# Patient Record
Sex: FEMALE | Race: WHITE | NOT HISPANIC OR LATINO | Employment: OTHER | URBAN - METROPOLITAN AREA
[De-identification: names, ages, dates, MRNs, and addresses within clinical notes are randomized per-mention and may not be internally consistent; named-entity substitution may affect disease eponyms.]

---

## 2017-01-09 ENCOUNTER — OFFICE VISIT (OUTPATIENT)
Dept: PODIATRY | Facility: CLINIC | Age: 82
End: 2017-01-09
Payer: MEDICARE

## 2017-01-09 DIAGNOSIS — Z09 FOLLOW-UP EXAMINATION FOLLOWING SURGERY: Primary | ICD-10-CM

## 2017-01-09 PROCEDURE — 99024 POSTOP FOLLOW-UP VISIT: CPT | Mod: S$GLB,,,

## 2017-01-09 PROCEDURE — 99999 PR PBB SHADOW E&M-EST. PATIENT-LVL II: CPT | Mod: PBBFAC,,,

## 2017-01-09 NOTE — PROGRESS NOTES
Patient returns status post nail vulsion  x 2 weeks.  Patient states doing well. Patient is doing the soaks in Epsom salt and local wound care with triple antibiotic and a Band-aid. Patient has decreased pain, swelling and redness.     PE:    Phenol avulsion site looks clean without any signs of cryptosis, mild edema and     Assessment:  Doing well postoperatively.        Plan:   Phenol site was cleansed, continue the soaks for 2 weeks, antitbiotic cream, return to clinic 2 weeks

## 2017-01-09 NOTE — MR AVS SNAPSHOT
[facility]  [address]  [place] [address]  Phone: [phone]                  Silke Dodd   2017 11:30 AM   Office Visit    Description:  Female : 10/1/1928   Provider:  Yeyo Toth DPM   Department:  [facility]           Reason for Visit     Follow-up                To Do List           Future Appointments        Provider Department Dept Phone    2017 11:30 AM Yeyo Toth DPM [facility] [phone]      Goals (5 Years of Data)     None      [facility]     [facility] -  Assistance  Registered nurses in the [facility] On Call Center provide clinical advisement, health education, appointment booking, and other advisory services.  Call for this free service at [phone].             Medications           Message regarding Medications     Verify the changes and/or additions to your medication regime listed below are the same as discussed with your clinician today.  If any of these changes or additions are incorrect, please notify your healthcare provider.             Verify that the below list of medications is an accurate representation of the medications you are currently taking.  If none reported, the list may be blank. If incorrect, please contact your healthcare provider. Carry this list with you in case of emergency.           Current Medications     amlodipine (NORVASC) 2.5 MG tablet Take 2.5 mg by mouth every evening.     aspirin (ECOTRIN) 81 MG EC tablet Take 81 mg by mouth Daily.    CALCIUM CARBONATE/VITAMIN D3 (VITAMIN D-3 ORAL) Take by mouth.    cephALEXin (KEFLEX) 500 MG capsule TAKE 1 CAPSULE BY MOUTH 3 TIMES DAILY    cholecalciferol, vitamin D3, (VITAMIN D3) 5,000 unit Tab Take 5,000 Units by mouth once daily.    clonazePAM (KLONOPIN) 0.5 MG tablet Take 0.5 mg by mouth 2 (two) times daily as needed.    escitalopram oxalate (LEXAPRO) 10 MG tablet Take 1 tablet (10 mg total) by mouth once daily.     levothyroxine (SYNTHROID) 75 MCG tablet Take 1 tablet (75 mcg total) by mouth once daily.    metoprolol succinate (TOPROL-XL) 25 MG 24 hr tablet Take 0.5 tablets (12.5 mg total) by mouth once daily.    ranitidine (ZANTAC) 150 MG tablet Take 1 tablet (150 mg total) by mouth 2 (two) times daily.    sulfamethoxazole-trimethoprim 800-160mg (BACTRIM DS) 800-160 mg Tab Take 1 tablet by mouth 2 (two) times daily.           Clinical Reference Information           Allergies as of 1/9/2017     No Known Allergies      Immunizations Administered on Date of Encounter - 1/9/2017     None

## 2017-01-09 NOTE — PROGRESS NOTES
Chief Complaint   Patient presents with    Follow-up     10 day f/u          History of present illness: Patient presents to the clinic with an infected left great toe. She said that she had the toenail removed back on December fifth by Dr. Alegria. It's been red and draining ever since. She was started on Bactrim now for second time. She's having tenderness to touch.    Review of Systems:  Vascular : negative rest pain, claudication, bruising  Musculoskeletal: negative for joint pain   Skin: Positive for ingrown toenail  Neurological: negative for burning, tingling and numbness  All other negative.    Past medical history, family history, social history  and  allergies reviewed.    Constitutional:  Patient is oriented to person, place, and time. Vital signs are normal.  Appears well-developed and well-nourished.      Vascular:  Dorsalis pedis pulses are 2+ on the right side, and 2+ on the left side.   Posterior tibial pulses are 2+ on the right side, and 2+ on the left side.   + digital hair growth, capillary fill time to all toes <3 seconds,+swelling and redness left great toenail    Skin/Dermatological:  Skin is warm.  No cyanosis or clubbing. No rashes noted.  No open wounds.   Left great toe has been partially removed but residual toenail is cryptotic with periungual edema and erythema, tender to palpation.      Musculoskeletal:    Normal range of motion of bilateral metatarsal, subtalar and ankle joints, no deformity, tenderness or crepitus. No assymmetries noted. Muscle strength to tibialis anterior, extensor hallucis longus, extensor digitorum longus, peroneal muscles, flexor hallucis/digotorum longus, posterior tibial and gastrosoleal complex is 5/5.    Neurological:  No deficits to sharp/dull, light touch or vibratory sensation.   Normal strength lower extremity muscles, normal tone without assymmetry   Reflex Scores:       Patellar reflexes are 1+ on the right side and 1+ on the left side.       Achilles  reflexes are 1+ on the right side and 1+ on the left side.      Asseement/Plan:  #1Onychocryptosis, onychia hallux toenail: A digital block to the left hallux with 2 1/2 cc of lidocaine 1% plain and 2-1/2 cc of Marcaine 0.75% plain was performed.  Informed consent was obtained with explanation of risks, indications, complications and alternatives.  A timeout was performed.   Laterality of the foot was noted.  A partial nail avulsion of the residual cryptotic nail left hallux was performed.  Silvadene and a dry sterile dressing was placed.  Patient tolerated well. There were no complications. Estimated blood loss was minimal.  Patient was given instructions for soaks with Epson salt and wound care to be done up to 30 days or until healed.  Neosporin and antifungal to toenail.  Return to clinic 2 weeks.

## 2017-01-23 ENCOUNTER — OFFICE VISIT (OUTPATIENT)
Dept: PODIATRY | Facility: CLINIC | Age: 82
End: 2017-01-23
Payer: MEDICARE

## 2017-01-23 DIAGNOSIS — Z09 FOLLOW-UP EXAMINATION FOLLOWING SURGERY: Primary | ICD-10-CM

## 2017-01-23 PROCEDURE — 99999 PR PBB SHADOW E&M-EST. PATIENT-LVL II: CPT | Mod: PBBFAC,,,

## 2017-01-23 PROCEDURE — 99024 POSTOP FOLLOW-UP VISIT: CPT | Mod: S$GLB,,,

## 2017-01-23 NOTE — PROGRESS NOTES
Patient returns status post partial nail avulsion x 2 weeks.  Patient states doing well. Patient is doing the soaks in Epsom salt and local wound care with triple antibiotic and a Band-aid. Patient has decreased pain, swelling and redness.     PE:    Avulsion site looks clean without any signs of cryptosis, edema or erythema.    Assessment:  Doing well postoperatively.        Plan:   Phenol site was cleansed and debrided.  Continue soaks and antibiotic cream for 1-2 weeks or until no drainage.. Return to clinic p.r.n.

## 2017-01-31 ENCOUNTER — OFFICE VISIT (OUTPATIENT)
Dept: DERMATOLOGY | Facility: CLINIC | Age: 82
End: 2017-01-31
Payer: MEDICARE

## 2017-01-31 VITALS — HEIGHT: 65 IN | WEIGHT: 125 LBS | RESPIRATION RATE: 16 BRPM | BODY MASS INDEX: 20.83 KG/M2

## 2017-01-31 DIAGNOSIS — L82.1 SEBORRHEIC KERATOSES: Primary | ICD-10-CM

## 2017-01-31 DIAGNOSIS — Z12.83 SKIN CANCER SCREENING: ICD-10-CM

## 2017-01-31 PROCEDURE — 1126F AMNT PAIN NOTED NONE PRSNT: CPT | Mod: S$GLB,,, | Performed by: DERMATOLOGY

## 2017-01-31 PROCEDURE — 99202 OFFICE O/P NEW SF 15 MIN: CPT | Mod: S$GLB,,, | Performed by: DERMATOLOGY

## 2017-01-31 PROCEDURE — 1157F ADVNC CARE PLAN IN RCRD: CPT | Mod: S$GLB,,, | Performed by: DERMATOLOGY

## 2017-01-31 PROCEDURE — 1160F RVW MEDS BY RX/DR IN RCRD: CPT | Mod: S$GLB,,, | Performed by: DERMATOLOGY

## 2017-01-31 PROCEDURE — 99999 PR PBB SHADOW E&M-EST. PATIENT-LVL II: CPT | Mod: PBBFAC,,, | Performed by: DERMATOLOGY

## 2017-01-31 PROCEDURE — 1159F MED LIST DOCD IN RCRD: CPT | Mod: S$GLB,,, | Performed by: DERMATOLOGY

## 2017-01-31 NOTE — PROGRESS NOTES
Subjective:       Patient ID:  Silke Dodd is a 88 y.o. female who presents for   Chief Complaint   Patient presents with    Skin Check    Lesion     HPI Comments: IV for skin check   Lesion above R eyebrow dark in color , no bleeding , no itching - daughter noticed approx 1 month ago - never treated  Had seen Dr Singleton in past & Dr Sharma approx 4 yrs ago       Moderate exposure during life  No phx skin ca   No fhx skin ca     Phx R breast ca  S/P mastectomy - no chemo,  no radiation     Lesion         Review of Systems   Skin: Positive for activity-related sunscreen use. Negative for dry skin, daily sunscreen use, sensitivity to antibiotic ointment, sensitivity to bandage adhesive and tendency to form keloidal scars.   Hematologic/Lymphatic: Bruises/bleeds easily.        Objective:    Physical Exam   Skin:                      Diagram Legend     Erythematous scaling macule/papule c/w actinic keratosis       Vascular papule c/w angioma      Pigmented verrucoid papule/plaque c/w seborrheic keratosis      Yellow umbilicated papule c/w sebaceous hyperplasia      Irregularly shaped tan macule c/w lentigo     1-2 mm smooth white papules consistent with Milia      Movable subcutaneous cyst with punctum c/w epidermal inclusion cyst      Subcutaneous movable cyst c/w pilar cyst      Firm pink to brown papule c/w dermatofibroma      Pedunculated fleshy papule(s) c/w skin tag(s)      Evenly pigmented macule c/w junctional nevus     Mildly variegated pigmented, slightly irregular-bordered macule c/w mildly atypical nevus      Flesh colored to evenly pigmented papule c/w intradermal nevus       Pink pearly papule/plaque c/w basal cell carcinoma      Erythematous hyperkeratotic cursted plaque c/w SCC      Surgical scar with no sign of skin cancer recurrence      Open and closed comedones      Inflammatory papules and pustules      Verrucoid papule consistent consistent with wart     Erythematous eczematous patches and  plaques     Dystrophic onycholytic nail with subungual debris c/w onychomycosis     Umbilicated papule    Erythematous-base heme-crusted tan verrucoid plaque consistent with inflamed seborrheic keratosis     Erythematous Silvery Scaling Plaque c/w Psoriasis     See annotation      Assessment / Plan:        Seborrheic keratoses- face/trunk/extremities  These are benign inherited growths without a malignant potential. Reassurance given to patient. No treatment is necessary.       Skin cancer screening  Upper body skin examination performed today including at least 6 points as noted in physical examination. No lesions suspicious for malignancy noted.               Return if symptoms worsen or fail to improve.

## 2017-02-27 ENCOUNTER — TELEPHONE (OUTPATIENT)
Dept: FAMILY MEDICINE | Facility: CLINIC | Age: 82
End: 2017-02-27

## 2017-02-27 NOTE — TELEPHONE ENCOUNTER
----- Message from Sara Sarah sent at 2/27/2017  3:34 PM CST -----  Returning your call, also states that she has a question to ask.  Please call [phone].

## 2017-02-27 NOTE — TELEPHONE ENCOUNTER
Called pt to schedule annual. Gave her two dates that she wanted to talk over with her daughter and she will call back to schedule.

## 2017-02-27 NOTE — TELEPHONE ENCOUNTER
----- Message from Trinidad Lowery sent at 2/27/2017 11:43 AM CST -----  Contact: self  Patient called to schedule her annual  Nothing showing until 6/17  Sh would like to be seen is March  Please call    Thanks

## 2017-03-02 ENCOUNTER — TELEPHONE (OUTPATIENT)
Dept: FAMILY MEDICINE | Facility: CLINIC | Age: 82
End: 2017-03-02

## 2017-03-02 NOTE — TELEPHONE ENCOUNTER
Called pt daughter Mrs. Gallegos, she knows pt has an appt (3/21/17) coming up and she wanted to let Dr. Szymanski know a head of time that pt is having memory problems. She will be with her at appt but did not want to say this in front of the pt at appt. Please advise.

## 2017-03-02 NOTE — TELEPHONE ENCOUNTER
----- Message from Ca Burns sent at 3/2/2017  8:29 AM CST -----  Contact: daughter-Ms Gallegos  Needs memory evaluation on the patient and would like to discuss this with you.  Please call back at

## 2017-03-21 ENCOUNTER — OFFICE VISIT (OUTPATIENT)
Dept: FAMILY MEDICINE | Facility: CLINIC | Age: 82
End: 2017-03-21
Payer: MEDICARE

## 2017-03-21 ENCOUNTER — OFFICE VISIT (OUTPATIENT)
Dept: OPTOMETRY | Facility: CLINIC | Age: 82
End: 2017-03-21
Payer: MEDICARE

## 2017-03-21 VITALS
HEIGHT: 65 IN | SYSTOLIC BLOOD PRESSURE: 114 MMHG | DIASTOLIC BLOOD PRESSURE: 68 MMHG | WEIGHT: 127.19 LBS | HEART RATE: 60 BPM | RESPIRATION RATE: 16 BRPM | BODY MASS INDEX: 21.19 KG/M2

## 2017-03-21 DIAGNOSIS — Z96.1 BILATERAL PSEUDOPHAKIA: ICD-10-CM

## 2017-03-21 DIAGNOSIS — Z13.5 GLAUCOMA SCREENING: ICD-10-CM

## 2017-03-21 DIAGNOSIS — H43.819 POSTERIOR VITREOUS DETACHMENT, UNSPECIFIED LATERALITY: Primary | ICD-10-CM

## 2017-03-21 DIAGNOSIS — Z98.890 S/P IRIDECTOMY: ICD-10-CM

## 2017-03-21 DIAGNOSIS — F41.9 ANXIETY: ICD-10-CM

## 2017-03-21 DIAGNOSIS — H35.62 RETINAL HEMORRHAGE OF LEFT EYE: ICD-10-CM

## 2017-03-21 DIAGNOSIS — I10 ESSENTIAL HYPERTENSION: Primary | ICD-10-CM

## 2017-03-21 DIAGNOSIS — I70.0 AORTIC ATHEROSCLEROSIS: ICD-10-CM

## 2017-03-21 DIAGNOSIS — H21.261 IRIS ATROPHY, RIGHT: ICD-10-CM

## 2017-03-21 PROCEDURE — 92015 DETERMINE REFRACTIVE STATE: CPT | Mod: S$GLB,,, | Performed by: OPTOMETRIST

## 2017-03-21 PROCEDURE — 99499 UNLISTED E&M SERVICE: CPT | Mod: S$GLB,,, | Performed by: FAMILY MEDICINE

## 2017-03-21 PROCEDURE — 1160F RVW MEDS BY RX/DR IN RCRD: CPT | Mod: S$GLB,,, | Performed by: FAMILY MEDICINE

## 2017-03-21 PROCEDURE — 99999 PR PBB SHADOW E&M-EST. PATIENT-LVL III: CPT | Mod: PBBFAC,,, | Performed by: FAMILY MEDICINE

## 2017-03-21 PROCEDURE — 92004 COMPRE OPH EXAM NEW PT 1/>: CPT | Mod: S$GLB,,, | Performed by: OPTOMETRIST

## 2017-03-21 PROCEDURE — 1157F ADVNC CARE PLAN IN RCRD: CPT | Mod: S$GLB,,, | Performed by: FAMILY MEDICINE

## 2017-03-21 PROCEDURE — 1159F MED LIST DOCD IN RCRD: CPT | Mod: S$GLB,,, | Performed by: FAMILY MEDICINE

## 2017-03-21 PROCEDURE — 1126F AMNT PAIN NOTED NONE PRSNT: CPT | Mod: S$GLB,,, | Performed by: FAMILY MEDICINE

## 2017-03-21 PROCEDURE — 99213 OFFICE O/P EST LOW 20 MIN: CPT | Mod: S$GLB,,, | Performed by: FAMILY MEDICINE

## 2017-03-21 PROCEDURE — 99999 PR PBB SHADOW E&M-EST. PATIENT-LVL II: CPT | Mod: PBBFAC,,, | Performed by: OPTOMETRIST

## 2017-03-21 NOTE — MR AVS SNAPSHOT
[place] [facility]  [address]  [place] [address]  Phone: [phone]  Fax: [phone]                  Silke Dodd   3/21/2017 1:45 PM   Office Visit    Description:  Female : 10/1/1928   Provider:  FAVIAN Finney, OD   Department:  [facility]           Reason for Visit     Annual Exam     Spots and/or Floaters           Diagnoses this Visit        Comments    Posterior vitreous detachment, unspecified laterality    -  Primary     Retinal hemorrhage of left eye         Iris atrophy, right         Glaucoma screening         S/P iridectomy         Bilateral pseudophakia                To Do List           Future Appointments        Provider Department Dept Phone    3/30/2017 10:15 AM Yeyo Toth DPM [facility] [phone]    2017 1:15 PM FAVIAN Finney, NEREYDA [facility] [phone]    2017 2:00 PM Ronnie Szymanski MD [facility] [phone]      Goals (5 Years of Data)     None      Follow-Up and Disposition     Return in about 6 weeks (around 2017).      [facility] On Call     [facility] Nurse Care Line -  Assistance  Registered nurses in the [facility] Center provide clinical advisement, health education, appointment booking, and other advisory services.  Call for this free service at [phone].             Medications           Message regarding Medications     Verify the changes and/or additions to your medication regime listed below are the same as discussed with your clinician today.  If any of these changes or additions are incorrect, please notify your healthcare provider.             Verify that the below list of medications is an accurate representation of the medications you are currently taking.  If none reported, the list may be blank. If incorrect, please contact your healthcare provider. Carry this list with you in case of emergency.           Current Medications     amlodipine (NORVASC)  "2.5 MG tablet Take 2.5 mg by mouth every evening.     aspirin (ECOTRIN) 81 MG EC tablet Take 81 mg by mouth Daily.    CALCIUM CARBONATE/VITAMIN D3 (VITAMIN D-3 ORAL) Take by mouth.    cephALEXin (KEFLEX) 500 MG capsule TAKE 1 CAPSULE BY MOUTH 3 TIMES DAILY    cholecalciferol, vitamin D3, (VITAMIN D3) 5,000 unit Tab Take 5,000 Units by mouth once daily.    clonazePAM (KLONOPIN) 0.5 MG tablet Take 0.5 mg by mouth 2 (two) times daily as needed.    escitalopram oxalate (LEXAPRO) 10 MG tablet Take 1 tablet (10 mg total) by mouth once daily.    levothyroxine (SYNTHROID) 75 MCG tablet Take 1 tablet (75 mcg total) by mouth once daily.    metoprolol succinate (TOPROL-XL) 25 MG 24 hr tablet Take 0.5 tablets (12.5 mg total) by mouth once daily.    ranitidine (ZANTAC) 150 MG tablet Take 1 tablet (150 mg total) by mouth 2 (two) times daily.    sulfamethoxazole-trimethoprim 800-160mg (BACTRIM DS) 800-160 mg Tab Take 1 tablet by mouth 2 (two) times daily.           Clinical Reference Information           Allergies as of 3/21/2017     No Known Allergies      Immunizations Administered on Date of Encounter - 3/21/2017     None      Instructions    FLASHES / FLOATERS / POSTERIOR VITREOUS DETACHMENT    Call the clinic if you have any further changes in symptoms.  Including:  Increased numbers of floaters or flashing lights, dimness or darkness that moves through or stays constant in your vision, or any pain in the eye (s).            DRY EYES:  Use Over The Counter artificial tears as needed for dry eye symptoms.  Some common brands include:  Systane, Optive, and Refresh.  These drops can be used as frequently as desired, but may be most helpful use during long periods of concentrated work.  For example, reading / working at the computer.  Avoid drops that "get redness out", as these contain medication that may further irritate the eyes.    ALLERGY EYES / SYMPTOMS:    Over the counter medications include--Zaditor and Alaway  Use as " directed 1-2 drops daily for symptoms of itching / watering eyes.  These drops will not help for dry eye or exposure symptoms.           Language Assistance Services     ATTENTION: Language assistance services are available, free of charge. Please call [phone].      ATENCIÓN: Si octavio crain, tiene a gilbert disposición servicios gratuitos de asistencia lingüística. Llame al [phone].     CHÚ Ý: N?u b?n nói Ti?ng Vi?t, có các d?ch v? h? tr? ngôn ng? mi?n phí dành cho b?n. G?i s? [phone].         [facility] complies with applicable Federal civil rights laws and does not discriminate on the basis of race, color, national origin, age, disability, or sex.

## 2017-03-21 NOTE — PROGRESS NOTES
HPI     Annual Exam    Additional comments: DLE x 3 yrs (outside [facility])      ocular health   exam // pt states no visual complaints           Spots and/or Floaters    Additional comments: OU -- no light flashes           Comments   KASSY Botello 2005  Silvia 2006       Last edited by FAVIAN Finney, OD on 3/21/2017  2:19 PM. (History)            Assessment /Plan     For exam results, see Encounter Report.    Posterior vitreous detachment, unspecified laterality    Retinal hemorrhage of left eye    Iris atrophy, right    Glaucoma screening    S/P iridectomy    Bilateral pseudophakia      1. RD precautions given  2. Blot heme near disc OS, control BP  Will RTC 5 weeks for repeat DFE, knows to call if any [facility] changes OS  3. Monitor  4. S/p acute ACG OD tx with Dr Vega  5. 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 Stable OU    Updated specs rx, gave copy, fill prn    RTC 4/25/17 for repeat DFE

## 2017-03-21 NOTE — MR AVS SNAPSHOT
[facility]  [address] [facility] [address]  [place] [address]  Phone: [phone]  Fax: [phone]                  Silke Dodd   3/21/2017 9:30 AM   Office Visit    Description:  Female : 10/1/1928   Provider:  Ronnie Szymanski MD   Department:  [facility]           Reason for Visit     Annual Exam           Diagnoses this Visit        Comments    Essential hypertension    -  Primary            To Do List           Future Appointments        Provider Department Dept Phone    3/21/2017 1:45 PM FAVINA Finney OD [facility] [phone]    2017 2:00 PM Ronnie Szymanski MD [facility] [phone]      Goals (5 Years of Data)     None      Follow-Up and Disposition     Return in about 4 months (around 2017).      [facility] On Call     [facility] On Call Nurse [facility] - 24/ Assistance  Registered nurses in the [facility] On Call Center provide clinical advisement, health education, appointment booking, and other advisory services.  Call for this free service at [phone].             Medications           Message regarding Medications     Verify the changes and/or additions to your medication regime listed below are the same as discussed with your clinician today.  If any of these changes or additions are incorrect, please notify your healthcare provider.             Verify that the below list of medications is an accurate representation of the medications you are currently taking.  If none reported, the list may be blank. If incorrect, please contact your healthcare provider. Carry this list with you in case of emergency.           Current Medications     amlodipine (NORVASC) 2.5 MG tablet Take 2.5 mg by mouth every evening.     aspirin (ECOTRIN) 81 MG EC tablet Take 81 mg by mouth Daily.    CALCIUM CARBONATE/VITAMIN D3 (VITAMIN D-3 ORAL) Take by mouth.    cephALEXin (KEFLEX) 500 MG capsule TAKE 1 CAPSULE BY MOUTH 3 TIMES DAILY     "cholecalciferol, vitamin D3, (VITAMIN D3) 5,000 unit Tab Take 5,000 Units by mouth once daily.    clonazePAM (KLONOPIN) 0.5 MG tablet Take 0.5 mg by mouth 2 (two) times daily as needed.    escitalopram oxalate (LEXAPRO) 10 MG tablet Take 1 tablet (10 mg total) by mouth once daily.    levothyroxine (SYNTHROID) 75 MCG tablet Take 1 tablet (75 mcg total) by mouth once daily.    metoprolol succinate (TOPROL-XL) 25 MG 24 hr tablet Take 0.5 tablets (12.5 mg total) by mouth once daily.    ranitidine (ZANTAC) 150 MG tablet Take 1 tablet (150 mg total) by mouth 2 (two) times daily.    sulfamethoxazole-trimethoprim 800-160mg (BACTRIM DS) 800-160 mg Tab Take 1 tablet by mouth 2 (two) times daily.           Clinical Reference Information           Your Vitals Were     BP Pulse Resp Height Weight BMI    114/68 (BP Location: Right arm, Patient Position: Sitting, BP Method: Manual) 60 16 5' 4.5" (1.638 m) 57.7 kg (127 lb 3.3 oz) 21.5 kg/m2      Blood Pressure          Most Recent Value    BP  114/68      Allergies as of 3/21/2017     No Known Allergies      Immunizations Administered on Date of Encounter - 3/21/2017     None      Language Assistance Services     ATTENTION: Language assistance services are available, free of charge. Please call [phone].      ATENCIÓN: Si habla español, tiene a gilbert disposición servicios gratuitos de asistencia lingüística. Llame al [phone].     MIGUEL A Ý: N?u b?n nói Ti?ng Vi?t, có các d?ch v? h? tr? ngôn ng? mi?n phí dành cho b?n. G?i s? [phone].         [facility] complies with applicable Federal civil rights laws and does not discriminate on the basis of race, color, national origin, age, disability, or sex.        "

## 2017-03-22 RX ORDER — AMLODIPINE BESYLATE 2.5 MG/1
2.5 TABLET ORAL NIGHTLY
Qty: 90 TABLET | Refills: 3 | Status: SHIPPED | OUTPATIENT
Start: 2017-03-22 | End: 2017-12-13

## 2017-03-22 RX ORDER — ESCITALOPRAM OXALATE 10 MG/1
10 TABLET ORAL DAILY
Qty: 90 TABLET | Refills: 3 | Status: SHIPPED | OUTPATIENT
Start: 2017-03-22 | End: 2017-06-27 | Stop reason: SDUPTHER

## 2017-03-22 RX ORDER — METOPROLOL SUCCINATE 25 MG/1
12.5 TABLET, EXTENDED RELEASE ORAL DAILY
Qty: 45 TABLET | Refills: 3 | Status: SHIPPED | OUTPATIENT
Start: 2017-03-22 | End: 2019-07-18

## 2017-03-26 NOTE — PROGRESS NOTES
Subjective:       Patient ID: Silke Dodd is a 88 y.o. female    Chief Complaint: Annual Exam (hm due: tetanus, zoster, prevnar)    HPI  Patient presents today for interval evaluation  Her primary issue is dealing with the recent death of her .  Since his death she has had increased anxiety with associated increased confusion.  Her mental status today is normal, but anxious.    Review of Systems   HENT: Negative for hearing loss.    Eyes: Negative for discharge.   Respiratory: Negative for wheezing.    Cardiovascular: Negative for chest pain and palpitations.   Gastrointestinal: Negative for blood in stool, constipation, diarrhea and vomiting.   Genitourinary: Negative for dysuria and hematuria.   Neurological: Positive for weakness. Negative for headaches.   Endo/Heme/Allergies: Negative for polydipsia.         Objective:   Physical Exam   Constitutional: She appears well-developed and well-nourished.   HENT:   Head: Normocephalic and atraumatic.   Eyes: Conjunctivae and EOM are normal. Pupils are equal, round, and reactive to light. No scleral icterus.   Neck: Normal range of motion. Neck supple. No thyromegaly present.   Cardiovascular: Normal rate, regular rhythm and normal heart sounds.  Exam reveals no gallop and no friction rub.    No murmur heard.  Pulmonary/Chest: Effort normal and breath sounds normal. No respiratory distress. She has no wheezes. She has no rales.   Lymphadenopathy:     She has no cervical adenopathy.   Vitals reviewed.        Assessment:       1. Essential hypertension     2. Aortic atherosclerosis           Plan:       Essential hypertension with Aortic atherosclerosis  - Continue current therapy  - Serial blood pressure monitoring  - Diet and exercise education.  - Return in about 4 months (around 7/21/2017).

## 2017-03-29 ENCOUNTER — TELEPHONE (OUTPATIENT)
Dept: PODIATRY | Facility: CLINIC | Age: 82
End: 2017-03-29

## 2017-03-29 NOTE — TELEPHONE ENCOUNTER
Pt informed that there is no appt  later in the day at this time. She stated that she will call later today or early tomorrow morning if she decides to reschedule.

## 2017-03-29 NOTE — TELEPHONE ENCOUNTER
----- Message from RT Aditya sent at 3/29/2017 10:33 AM CDT -----  Contact: pt    pt , requesting to be worked in for her appt tomorrow after 01:00 pm, if possible, and Cx her coming in for 10:15 AM, thanks,

## 2017-03-30 ENCOUNTER — TELEPHONE (OUTPATIENT)
Dept: PODIATRY | Facility: CLINIC | Age: 82
End: 2017-03-30

## 2017-03-30 ENCOUNTER — OFFICE VISIT (OUTPATIENT)
Dept: PODIATRY | Facility: CLINIC | Age: 82
End: 2017-03-30
Payer: MEDICARE

## 2017-03-30 VITALS — BODY MASS INDEX: 21.16 KG/M2 | WEIGHT: 127 LBS | HEIGHT: 65 IN

## 2017-03-30 DIAGNOSIS — B35.1 ONYCHOMYCOSIS DUE TO DERMATOPHYTE: ICD-10-CM

## 2017-03-30 DIAGNOSIS — I73.9 PERIPHERAL VASCULAR DISEASE: Primary | ICD-10-CM

## 2017-03-30 PROCEDURE — 1159F MED LIST DOCD IN RCRD: CPT | Mod: S$GLB,,,

## 2017-03-30 PROCEDURE — 99999 PR PBB SHADOW E&M-EST. PATIENT-LVL III: CPT | Mod: PBBFAC,,,

## 2017-03-30 PROCEDURE — 1126F AMNT PAIN NOTED NONE PRSNT: CPT | Mod: S$GLB,,,

## 2017-03-30 PROCEDURE — 1160F RVW MEDS BY RX/DR IN RCRD: CPT | Mod: S$GLB,,,

## 2017-03-30 PROCEDURE — 99213 OFFICE O/P EST LOW 20 MIN: CPT | Mod: S$GLB,,,

## 2017-03-30 PROCEDURE — 99499 UNLISTED E&M SERVICE: CPT | Mod: S$GLB,,,

## 2017-03-30 PROCEDURE — 1157F ADVNC CARE PLAN IN RCRD: CPT | Mod: S$GLB,,,

## 2017-03-30 NOTE — MR AVS SNAPSHOT
[facility]  [address]  [place] [address]  Phone: [phone]                  Silke Dodd   3/30/2017 2:00 PM   Office Visit    Description:  Female : 10/1/1928   Provider:  Yeyo Toth DPM   Department:  [facility]           Reason for Visit     Nail Problem                To Do List           Future Appointments        Provider Department Dept Phone    2017 1:15 PM FAVIAN Finney OD [facility] [phone]    2017 2:00 PM Ronnie Szymanski MD [facility] [phone]      Goals (5 Years of Data)     None      [facility] On Call     [facility] On Call Nurse Care Line -  Assistance  Unless otherwise directed by your provider, please contact [facility], our nurse care line that is available for  assistance.     Registered nurses in the [facility] provide: appointment scheduling, clinical advisement, health education, and other advisory services.  Call: [phone] (toll free)               Medications           Message regarding Medications     Verify the changes and/or additions to your medication regime listed below are the same as discussed with your clinician today.  If any of these changes or additions are incorrect, please notify your healthcare provider.             Verify that the below list of medications is an accurate representation of the medications you are currently taking.  If none reported, the list may be blank. If incorrect, please contact your healthcare provider. Carry this list with you in case of emergency.           Current Medications     amlodipine (NORVASC) 2.5 MG tablet Take 1 tablet (2.5 mg total) by mouth every evening.    aspirin (ECOTRIN) 81 MG EC tablet Take 81 mg by mouth Daily.    CALCIUM CARBONATE/VITAMIN D3 (VITAMIN D-3 ORAL) Take by mouth.    cephALEXin (KEFLEX) 500 MG capsule TAKE 1 CAPSULE BY MOUTH 3 TIMES DAILY    cholecalciferol, vitamin D3, (VITAMIN D3) 5,000 unit  "Tab Take 5,000 Units by mouth once daily.    clonazePAM (KLONOPIN) 0.5 MG tablet Take 0.5 mg by mouth 2 (two) times daily as needed.    escitalopram oxalate (LEXAPRO) 10 MG tablet Take 1 tablet (10 mg total) by mouth once daily.    levothyroxine (SYNTHROID) 75 MCG tablet Take 1 tablet (75 mcg total) by mouth once daily.    metoprolol succinate (TOPROL-XL) 25 MG 24 hr tablet Take 0.5 tablets (12.5 mg total) by mouth once daily.    ranitidine (ZANTAC) 150 MG tablet Take 1 tablet (150 mg total) by mouth 2 (two) times daily.    sulfamethoxazole-trimethoprim 800-160mg (BACTRIM DS) 800-160 mg Tab Take 1 tablet by mouth 2 (two) times daily.           Clinical Reference Information           Your Vitals Were     Height Weight BMI          5' 4.5" (1.638 m) 57.6 kg (126 lb 15.8 oz) 21.46 kg/m2        Allergies as of 3/30/2017     No Known Allergies      Immunizations Administered on Date of Encounter - 3/30/2017     None      Instructions    Vicks weekly  Tea Tree oil 3x week  Lotrimin daily      [email or web address] (shoes and sandals)  SAS    Go get shoes stretched       Language Assistance Services     ATTENTION: Language assistance services are available, free of charge. Please call [phone].      ATENCIÓN: Si octavio crain, tiene a gilbert disposición servicios gratuitos de asistencia lingüística. Llame al [phone].     MIGUEL A Ý: N?u b?n nói Ti?ng Vi?t, có các d?ch v? h? tr? ngôn ng? mi?n phí dành cho b?n. G?i s? [phone].         [facility] complies with applicable Federal civil rights laws and does not discriminate on the basis of race, color, national origin, age, disability, or sex.        "

## 2017-03-30 NOTE — PATIENT INSTRUCTIONS
Vicks weekly  Tea Tree oil 3x week  Lotrimin daily      [email or web address] (shoes and sandals)  SAS    Go get shoes stretched

## 2017-03-30 NOTE — TELEPHONE ENCOUNTER
Per verbal from Dr Toth:  Patient is to get Lotrimin Solution - Over the counter, and it can be generic.

## 2017-03-30 NOTE — PROGRESS NOTES
Chief Complaint   Patient presents with    Nail Problem     magi great toe          History of present illness: Patient presents to the clinic to check on her left great toe, she had a paronychia and she also has discoloration of several other toenails.    Review of Systems:  Vascular : negative rest pain, claudication, bruising  Musculoskeletal: negative for joint pain   Skin: Positive for ingrown toenail  Neurological: negative for burning, tingling and numbness  All other negative.    Past medical history, family history, social history  and  allergies reviewed.    Constitutional:  Patient is oriented to person, place, and time. Vital signs are normal.  Appears well-developed and well-nourished.      Vascular:  Dorsalis pedis pulses are 1+ on the right side, and 1+ on the left side.   Posterior tibial pulses are 2+ on the right side, and 2+ on the left side.   + digital hair growth, capillary fill time to all toes <3 seconds    Skin/Dermatological:  Skin is warm.  No cyanosis or clubbing. No rashes noted.  No open wounds. Multiple  toenails are thickened, yellow and discolored with subungual debris.    Musculoskeletal:    Normal range of motion of bilateral metatarsal, subtalar and ankle joints, no deformity, tenderness or crepitus. No assymmetries noted. Muscle strength to tibialis anterior, extensor hallucis longus, extensor digitorum longus, peroneal muscles, flexor hallucis/digotorum longus, posterior tibial and gastrosoleal complex is 5/5.    Neurological:  No deficits to sharp/dull, light touch or vibratory sensation.   Normal strength lower extremity muscles, normal tone without assymmetry   Reflex Scores:       Patellar reflexes are 1+ on the right side and 1+ on the left side.       Achilles reflexes are 1+ on the right side and 1+ on the left side.      Assessment/Plan:  PVD, onychomycosis: Discussion of the etiology of the problem.  Patient tolerated well.  Keep feet clean and dry.  Vicks weekly. Tea  Tree oil 3x week.  Lotrimin daily.  Diet and exercise.  [email or web address] (shoes and sandals)  SAS.Go get shoes stretched.  RTC prn.

## 2017-03-30 NOTE — TELEPHONE ENCOUNTER
----- Message from Brielle Carrasco sent at 3/30/2017  4:15 PM CDT -----  Contact: self  Patient called regarding her appt today, was told to get a gel and it doesn't exist. Need to know further information regarding the medication. Please contact [phone]

## 2017-03-31 NOTE — TELEPHONE ENCOUNTER
Per Dr Toth:  Patient informed that Dr Toth wants her to get Lotrimin Solution - over the counter and that it can be generic.   Also instructed the patient that if she has trouble finding it, to ask the pharmacist/tech for assistance.  Patient stated she wrote the name down and understood.

## 2017-04-04 ENCOUNTER — TELEPHONE (OUTPATIENT)
Dept: PODIATRY | Facility: CLINIC | Age: 82
End: 2017-04-04

## 2017-04-04 NOTE — TELEPHONE ENCOUNTER
----- Message from Gwen Zuleta sent at 4/4/2017  3:32 PM CDT -----  Patient requesting to speak with nurse concerning medication/has question/please call back at [phone].

## 2017-04-04 NOTE — TELEPHONE ENCOUNTER
Patient stated that the bottle of Clotrimazole states not to use on toenails (Informed her that Dr Toth prescribes this a lot) and it is expensive.  She wants to know if there is something else that she can use instead.  Please advise.

## 2017-04-05 ENCOUNTER — PATIENT MESSAGE (OUTPATIENT)
Dept: PODIATRY | Facility: CLINIC | Age: 82
End: 2017-04-05

## 2017-04-05 NOTE — TELEPHONE ENCOUNTER
Per Dr Toth:  Patient again informed that you can use the medication on the toenails but that he will order Penlac for her.  She verbalized understanding and stated that we are not to order Penlac, that she will use the Clotrimazole since that is what Dr Toth originally ordered.

## 2017-04-05 NOTE — TELEPHONE ENCOUNTER
Yeyo Toth, EMMANUEL   to Me  You can use on toenails. Send [facility] 4/4/17 5:30 PM

## 2017-04-18 ENCOUNTER — TELEPHONE (OUTPATIENT)
Dept: OTOLARYNGOLOGY | Facility: CLINIC | Age: 82
End: 2017-04-18

## 2017-04-18 NOTE — TELEPHONE ENCOUNTER
----- Message from Gwen Zuleta sent at 4/18/2017  9:02 AM CDT -----  Patient needs ears cleaned (clogged and hearing an echo)patient depends on senior transportation/ has another appointment on April 25th at 1:15/would like to be seen on that day if possible/please call patient back at [phone] to schedule or advise.

## 2017-04-19 ENCOUNTER — TELEPHONE (OUTPATIENT)
Dept: OTOLARYNGOLOGY | Facility: CLINIC | Age: 82
End: 2017-04-19

## 2017-04-19 NOTE — TELEPHONE ENCOUNTER
----- Message from Filomena Anne sent at 4/19/2017  8:59 AM CDT -----  Contact: self  Patient wants to speak with a nurse regarding being seen on 04/25. Please call back at [phone] (home)

## 2017-04-27 ENCOUNTER — HOSPITAL ENCOUNTER (OUTPATIENT)
Dept: RADIOLOGY | Facility: HOSPITAL | Age: 82
Discharge: HOME | End: 2017-04-27
Attending: NURSE PRACTITIONER
Payer: MEDICARE

## 2017-04-27 ENCOUNTER — OFFICE VISIT (OUTPATIENT)
Dept: OPTOMETRY | Facility: CLINIC | Age: 82
End: 2017-04-27
Payer: MEDICARE

## 2017-04-27 ENCOUNTER — OFFICE VISIT (OUTPATIENT)
Dept: OTOLARYNGOLOGY | Facility: CLINIC | Age: 82
End: 2017-04-27
Payer: MEDICARE

## 2017-04-27 VITALS
HEIGHT: 64 IN | DIASTOLIC BLOOD PRESSURE: 70 MMHG | BODY MASS INDEX: 21.3 KG/M2 | HEART RATE: 61 BPM | WEIGHT: 124.75 LBS | SYSTOLIC BLOOD PRESSURE: 152 MMHG

## 2017-04-27 DIAGNOSIS — R09.82 PND (POST-NASAL DRIP): ICD-10-CM

## 2017-04-27 DIAGNOSIS — R43.2 DYSGEUSIA: Primary | ICD-10-CM

## 2017-04-27 DIAGNOSIS — R43.2 DYSGEUSIA: ICD-10-CM

## 2017-04-27 DIAGNOSIS — H61.23 BILATERAL IMPACTED CERUMEN: ICD-10-CM

## 2017-04-27 DIAGNOSIS — H35.62 RETINAL HEMORRHAGE OF LEFT EYE: Primary | ICD-10-CM

## 2017-04-27 DIAGNOSIS — R09.A2 GLOBUS SENSATION: ICD-10-CM

## 2017-04-27 DIAGNOSIS — R09.89 CHRONIC THROAT CLEARING: ICD-10-CM

## 2017-04-27 PROCEDURE — 1126F AMNT PAIN NOTED NONE PRSNT: CPT | Mod: S$GLB,,, | Performed by: NURSE PRACTITIONER

## 2017-04-27 PROCEDURE — 70220 X-RAY EXAM OF SINUSES: CPT | Mod: TC,PO

## 2017-04-27 PROCEDURE — 1159F MED LIST DOCD IN RCRD: CPT | Mod: S$GLB,,, | Performed by: NURSE PRACTITIONER

## 2017-04-27 PROCEDURE — 99203 OFFICE O/P NEW LOW 30 MIN: CPT | Mod: 25,S$GLB,, | Performed by: NURSE PRACTITIONER

## 2017-04-27 PROCEDURE — 99999 PR PBB SHADOW E&M-EST. PATIENT-LVL III: CPT | Mod: PBBFAC,,, | Performed by: NURSE PRACTITIONER

## 2017-04-27 PROCEDURE — 99999 PR PBB SHADOW E&M-EST. PATIENT-LVL II: CPT | Mod: PBBFAC,,, | Performed by: OPTOMETRIST

## 2017-04-27 PROCEDURE — 69210 REMOVE IMPACTED EAR WAX UNI: CPT | Mod: S$GLB,,, | Performed by: NURSE PRACTITIONER

## 2017-04-27 PROCEDURE — 70220 X-RAY EXAM OF SINUSES: CPT | Mod: 26,,, | Performed by: RADIOLOGY

## 2017-04-27 PROCEDURE — 1160F RVW MEDS BY RX/DR IN RCRD: CPT | Mod: S$GLB,,, | Performed by: NURSE PRACTITIONER

## 2017-04-27 PROCEDURE — 92014 COMPRE OPH EXAM EST PT 1/>: CPT | Mod: S$GLB,,, | Performed by: OPTOMETRIST

## 2017-04-27 NOTE — PROGRESS NOTES
"Subjective:       Patient ID: Silke Dodd is a 88 y.o. female.    Chief Complaint: Ear Fullness (bilaterally. pt states she has a metallic taste in her mouth )    HPI   Patient states ears feel like they have been underwater X 6 months. She has been taking Mucinex and allergy meds without relief. 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 She also reports "bad taste" in her mouth, like metallic. She has chronic sensation of phlegm in the back of her throat causing her to frequently clear her throat, but denies anterior rhinorrhea or nasal congestion.     Review of Systems   HENT: Positive for hearing loss and postnasal drip. Negative for congestion, ear discharge, ear pain, rhinorrhea, sinus pressure, sneezing and sore throat.        Objective:      Physical Exam   Constitutional: She is oriented to person, place, and time. Vital signs are normal. She appears well-developed and well-nourished. She is cooperative. She does not appear ill. No distress.   HENT:   Head: Normocephalic and atraumatic.   Right Ear: Hearing, tympanic membrane, external ear and ear canal normal. Tympanic membrane is not erythematous. No middle ear effusion.   Left Ear: Hearing, tympanic membrane, external ear and ear canal normal. Tympanic membrane is not erythematous.  No middle ear effusion.   Nose: Nose normal. No mucosal edema or rhinorrhea. Right sinus exhibits no maxillary sinus tenderness and no frontal sinus tenderness. Left sinus exhibits no maxillary sinus tenderness and no frontal sinus tenderness.   Mouth/Throat: Uvula is midline, oropharynx is clear and moist and mucous membranes are normal. Mucous membranes are not pale, not dry and not cyanotic. No oral lesions. No oropharyngeal exudate, posterior oropharyngeal edema or posterior oropharyngeal erythema.     SEPARATE PROCEDURE IN OFFICE:   Procedure: Removal of impacted cerumen, bilateral   Pre Procedure Diagnosis: " Cerumen Impaction   Post Procedure Diagnosis: Cerumen Impaction   Verbal informed consent in regards to risk of trauma to ear canal, ear drum or hearing, discomfort during procedure and/or inability to remove cerumen impaction in one session or unforeseen events or complications.   No anesthesia.     Procedure in detail:   Ear canal visualized bilateral with appropriate size ear speculum utilizing Operating Head Binocular Otomicroscope   Utilizing the following: Ring curet, delicate alligator forceps, and/or suction cannula. The impacted cerumen of the ear canals was removed atraumatically. The TM and EAC were then inspected and found to be clear of wax. See description of TMs/EACs in PE above.   Complications: No   Condition: Improved/Good     Eyes: Conjunctivae, EOM and lids are normal. Pupils are equal, round, and reactive to light. Right eye exhibits no discharge. Left eye exhibits no discharge. No scleral icterus.   Neck: Trachea normal and normal range of motion. Neck supple. No tracheal deviation present. No thyroid mass and no thyromegaly present.   Cardiovascular: Normal rate.    Pulmonary/Chest: Effort normal. No stridor. No respiratory distress. She has no wheezes.   Musculoskeletal: Normal range of motion.   Lymphadenopathy:        Head (right side): No submental, no submandibular, no tonsillar, no preauricular and no posterior auricular adenopathy present.        Head (left side): No submental, no submandibular, no tonsillar, no preauricular and no posterior auricular adenopathy present.     She has no cervical adenopathy.        Right cervical: No superficial cervical and no posterior cervical adenopathy present.       Left cervical: No superficial cervical and no posterior cervical adenopathy present.   Neurological: She is alert and oriented to person, place, and time. She has normal strength. Coordination and gait normal.   Skin: Skin is warm, dry and intact. No lesion and no rash noted. She is not  diaphoretic. No cyanosis. No pallor.   Psychiatric: She has a normal mood and affect. Her speech is normal and behavior is normal. Judgment and thought content normal. Cognition and memory are normal.   Nursing note and vitals reviewed.      Assessment:       1. Dysgeusia    2. PND (post-nasal drip)      3. Cerumen impactions removed AU  Plan:     Sinus x-rays today -- will call patient with results as soon as available    Discussed audiogram but patient politely declined at this time.   Discussed typical constellation of symptoms seen with acute bacterial sinusitis and LPRD.

## 2017-04-27 NOTE — PROGRESS NOTES
HPI     Concerns About Ocular Health    Additional comments: recheck disc heme OS--last seen 3/21/17           Comments   Agree above  Notes no new issues or changes         Last edited by FAVIAN Finney, OD on 4/27/2017 10:10 AM. (History)            Assessment /Plan     For exam results, see Encounter Report.    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

## 2017-04-27 NOTE — MR AVS SNAPSHOT
[facility]  [address]  [place] [address]  Phone: [phone]  Fax: [phone]                  Silke Dodd   2017 10:00 AM   Office Visit    Description:  Female : 10/1/1928   Provider:  FAVIAN Finney OD   Department:  [facility] [facility]           Reason for Visit     Concerns About Ocular Health           Diagnoses this Visit        Comments    Retinal hemorrhage of left eye    -  Primary            To Do List           Future Appointments        Provider Department Dept Phone    2017 2:00 PM Ronnie Szymanski MD [facility] [phone]      Goals (5 Years of Data)     None      Follow-Up and Disposition     Return in about 4 weeks (around 2017) for Retina consultation.      [facility] On Call     [facility] On Call Nurse Care Line -  Assistance  Unless otherwise directed by your provider, please contact [facility], our nurse care line that is available for  assistance.     Registered nurses in the [facility] provide: appointment scheduling, clinical advisement, health education, and other advisory services.  Call: [phone] (toll free)               Medications           Message regarding Medications     Verify the changes and/or additions to your medication regime listed below are the same as discussed with your clinician today.  If any of these changes or additions are incorrect, please notify your healthcare provider.             Verify that the below list of medications is an accurate representation of the medications you are currently taking.  If none reported, the list may be blank. If incorrect, please contact your healthcare provider. Carry this list with you in case of emergency.           Current Medications     amlodipine (NORVASC) 2.5 MG tablet Take 1 tablet (2.5 mg total) by mouth every evening.    aspirin (ECOTRIN) 81 MG EC tablet Take 81 mg by mouth Daily.    CALCIUM CARBONATE/VITAMIN D3 (VITAMIN D-3  ORAL) Take by mouth.    cholecalciferol, vitamin D3, (VITAMIN D3) 5,000 unit Tab Take 5,000 Units by mouth once daily.    escitalopram oxalate (LEXAPRO) 10 MG tablet Take 1 tablet (10 mg total) by mouth once daily.    levothyroxine (SYNTHROID) 75 MCG tablet Take 1 tablet (75 mcg total) by mouth once daily.    metoprolol succinate (TOPROL-XL) 25 MG 24 hr tablet Take 0.5 tablets (12.5 mg total) by mouth once daily.    ranitidine (ZANTAC) 150 MG tablet Take 1 tablet (150 mg total) by mouth 2 (two) times daily.           Clinical Reference Information           Allergies as of 4/27/2017     No Known Allergies      Immunizations Administered on Date of Encounter - 4/27/2017     None      Orders Placed During Today's Visit      Normal Orders This Visit    Ambulatory Referral to Ophthalmology       Language Assistance Services     ATTENTION: Language assistance services are available, free of charge. Please call [phone].      ATENCIÓN: Si deborahla breann, tiene a gilbert disposición servicios gratuitos de asistencia lingüística. Ute al [phone].     [facility] Ý: N?u b?n nói Ti?ng Vi?t, có các d?ch v? h? tr? ngôn ng? mi?n phí payamh cho b?n. G?i s? [phone].         [facility] complies with applicable Federal civil rights laws and does not discriminate on the basis of race, color, national origin, age, disability, or sex.

## 2017-05-29 ENCOUNTER — PATIENT MESSAGE (OUTPATIENT)
Dept: FAMILY MEDICINE | Facility: CLINIC | Age: 82
End: 2017-05-29

## 2017-06-05 ENCOUNTER — INITIAL CONSULT (OUTPATIENT)
Dept: OPHTHALMOLOGY | Facility: CLINIC | Age: 82
End: 2017-06-05
Payer: MEDICARE

## 2017-06-05 DIAGNOSIS — H43.813 POSTERIOR VITREOUS DETACHMENT, BOTH EYES: ICD-10-CM

## 2017-06-05 DIAGNOSIS — H35.052 CHOROIDAL NEOVASCULARIZATION, LEFT EYE: Primary | ICD-10-CM

## 2017-06-05 PROCEDURE — 99999 PR PBB SHADOW E&M-EST. PATIENT-LVL III: CPT | Mod: PBBFAC,,, | Performed by: OPHTHALMOLOGY

## 2017-06-05 PROCEDURE — 92225 PR SPECIAL EYE EXAM, INITIAL: CPT | Mod: 50,S$GLB,, | Performed by: OPHTHALMOLOGY

## 2017-06-05 PROCEDURE — 92014 COMPRE OPH EXAM EST PT 1/>: CPT | Mod: S$GLB,,, | Performed by: OPHTHALMOLOGY

## 2017-06-05 NOTE — LETTER
June 5, 2017      FAVIAN Finney, OD  [address] [facility] [place] [address]           [place]  [address] [facility] [place] [address]  Phone: [phone]  Fax: [phone]          Patient: Silke Dodd   MR Number: [id number]   YOB: 1928   Date of Visit: 6/5/2017       Dear Dr. FAVIAN Finney:    Thank you for referring Silke Dodd to me for evaluation. Attached you will find relevant portions of my assessment and plan of care.    If you have questions, please do not hesitate to call me. I look forward to following Silke Dodd along with you.    Sincerely,    ALEX Bedolla MD    Enclosure  CC:  No Recipients    If you would like to receive this communication electronically, please contact [email or web address] or [phone] to request more information on [facility] access.    For providers and/or their staff who would like to refer a patient to [facility], please contact us through our one-stop-shop provider referral line, [facility], at [phone].    If you feel you have received this communication in error or would no longer like to receive these types of communications, please e-mail [email or web address]

## 2017-06-05 NOTE — PROGRESS NOTES
Pt states she is having more difficulty with OU burning. Feels it is   dryness causing it.  Denies any visual distortion, floaters or flashes at   present.       PC IOL OU 2004  In NO monovision  OS is near  S/ P Yag Caps  S/P angle closure surgery Dr. Vega many years ago          A/P    1. PPCNV OS  Not foveal threatening  Monitor    2. PVD OU    3. PCIOL OU    4. Prior angle closure  S/p Sx         3 months OCT

## 2017-06-27 ENCOUNTER — OFFICE VISIT (OUTPATIENT)
Dept: FAMILY MEDICINE | Facility: CLINIC | Age: 82
End: 2017-06-27
Payer: MEDICARE

## 2017-06-27 VITALS
WEIGHT: 120.13 LBS | HEIGHT: 64 IN | BODY MASS INDEX: 20.51 KG/M2 | HEART RATE: 64 BPM | RESPIRATION RATE: 18 BRPM | SYSTOLIC BLOOD PRESSURE: 118 MMHG | DIASTOLIC BLOOD PRESSURE: 66 MMHG

## 2017-06-27 DIAGNOSIS — K21.9 GASTROESOPHAGEAL REFLUX DISEASE, ESOPHAGITIS PRESENCE NOT SPECIFIED: ICD-10-CM

## 2017-06-27 DIAGNOSIS — F41.9 ANXIETY: ICD-10-CM

## 2017-06-27 PROCEDURE — 99999 PR PBB SHADOW E&M-EST. PATIENT-LVL III: CPT | Mod: PBBFAC,,, | Performed by: FAMILY MEDICINE

## 2017-06-27 PROCEDURE — 1159F MED LIST DOCD IN RCRD: CPT | Mod: S$GLB,,, | Performed by: FAMILY MEDICINE

## 2017-06-27 PROCEDURE — 99213 OFFICE O/P EST LOW 20 MIN: CPT | Mod: S$GLB,,, | Performed by: FAMILY MEDICINE

## 2017-06-27 PROCEDURE — 1126F AMNT PAIN NOTED NONE PRSNT: CPT | Mod: S$GLB,,, | Performed by: FAMILY MEDICINE

## 2017-06-27 PROCEDURE — 99499 UNLISTED E&M SERVICE: CPT | Mod: S$GLB,,, | Performed by: FAMILY MEDICINE

## 2017-06-27 RX ORDER — LEVOTHYROXINE SODIUM 75 UG/1
75 TABLET ORAL DAILY
Qty: 90 TABLET | Refills: 3 | Status: SHIPPED | OUTPATIENT
Start: 2017-06-27 | End: 2017-06-27 | Stop reason: SDUPTHER

## 2017-06-27 RX ORDER — ESCITALOPRAM OXALATE 10 MG/1
10 TABLET ORAL DAILY
Qty: 90 TABLET | Refills: 3 | Status: SHIPPED | OUTPATIENT
Start: 2017-06-27 | End: 2019-02-26

## 2017-06-27 RX ORDER — ESCITALOPRAM OXALATE 10 MG/1
10 TABLET ORAL DAILY
Qty: 90 TABLET | Refills: 3 | Status: SHIPPED | OUTPATIENT
Start: 2017-06-27 | End: 2017-06-27 | Stop reason: SDUPTHER

## 2017-06-27 RX ORDER — LEVOTHYROXINE SODIUM 75 UG/1
75 TABLET ORAL DAILY
Qty: 90 TABLET | Refills: 3 | Status: SHIPPED | OUTPATIENT
Start: 2017-06-27 | End: 2018-07-17 | Stop reason: SDUPTHER

## 2017-06-30 NOTE — PROGRESS NOTES
Subjective:       Patient ID: Silke Dodd is a 88 y.o. female    Chief Complaint: Hypertension (follow up; hm due: tetanus, zoster, prevnar)    HPI  Here today for interval evaluation of hypertension.  Her primary concern today is continued anxiety.  This is a hallmark of this patient's personality.  No new issues noted    Review of Systems   Constitutional: Negative for activity change and unexpected weight change.   HENT: Negative for hearing loss, rhinorrhea and trouble swallowing.    Eyes: Negative for discharge and visual disturbance.   Respiratory: Negative for chest tightness and wheezing.    Cardiovascular: Negative for chest pain and palpitations.   Gastrointestinal: Positive for constipation. Negative for blood in stool, diarrhea and vomiting.   Endocrine: Negative for polydipsia and polyuria.   Genitourinary: Negative for difficulty urinating, dysuria, hematuria and menstrual problem.   Musculoskeletal: Negative for arthralgias, joint swelling and neck pain.   Neurological: Negative for weakness and headaches.   Psychiatric/Behavioral: Positive for dysphoric mood. Negative for confusion.         Objective:   Physical Exam   Constitutional: She is oriented to person, place, and time. She appears well-developed and well-nourished.   Neurological: She is alert and oriented to person, place, and time.   Vitals reviewed.        Assessment:       1. Anxiety  DISCONTINUED: escitalopram oxalate (LEXAPRO) 10 MG tablet    DISCONTINUED: escitalopram oxalate (LEXAPRO) 10 MG tablet    DISCONTINUED: escitalopram oxalate (LEXAPRO) 10 MG tablet   2. Gastroesophageal reflux disease, esophagitis presence not specified  DISCONTINUED: ranitidine (ZANTAC) 150 MG tablet    DISCONTINUED: ranitidine (ZANTAC) 150 MG tablet    DISCONTINUED: ranitidine (ZANTAC) 150 MG tablet         Plan:       Anxiety  START escitalopram oxalate (LEXAPRO) 10 MG tablet; Take 1 tablet (10 mg total) by mouth once daily.  Dispense: 90 tablet; Refill:  3  - No Follow-up on file.    Gastroesophageal reflux disease, esophagitis presence not specified  - ADD ranitidine (ZANTAC) 150 MG tablet; Take 1 tablet (150 mg total) by mouth 2 (two) times daily.  Dispense: 180 tablet; Refill: 3    Other orders  -     levothyroxine (SYNTHROID) 75 MCG tablet; Take 1 tablet (75 mcg total) by mouth once daily.  Dispense: 90 tablet; Refill: 3

## 2017-07-17 ENCOUNTER — TELEPHONE (OUTPATIENT)
Dept: PHARMACY | Facility: CLINIC | Age: 82
End: 2017-07-17

## 2017-10-30 ENCOUNTER — OFFICE VISIT (OUTPATIENT)
Dept: OPHTHALMOLOGY | Facility: CLINIC | Age: 82
End: 2017-10-30
Payer: MEDICARE

## 2017-10-30 DIAGNOSIS — H43.813 POSTERIOR VITREOUS DETACHMENT, BOTH EYES: Primary | ICD-10-CM

## 2017-10-30 DIAGNOSIS — H35.052 CHOROIDAL NEOVASCULARIZATION, LEFT EYE: ICD-10-CM

## 2017-10-30 PROCEDURE — 92226 PR SPECIAL EYE EXAM, SUBSEQUENT: CPT | Mod: 50,S$GLB,, | Performed by: OPHTHALMOLOGY

## 2017-10-30 PROCEDURE — 99999 PR PBB SHADOW E&M-EST. PATIENT-LVL II: CPT | Mod: PBBFAC,,, | Performed by: OPHTHALMOLOGY

## 2017-10-30 PROCEDURE — 92014 COMPRE OPH EXAM EST PT 1/>: CPT | Mod: S$GLB,,, | Performed by: OPHTHALMOLOGY

## 2017-10-30 PROCEDURE — 92134 CPTRZ OPH DX IMG PST SGM RTA: CPT | Mod: S$GLB,,, | Performed by: OPHTHALMOLOGY

## 2017-10-30 NOTE — PROGRESS NOTES
HPI     Concerns About Ocular Health    Additional comments: 4 mon Choroidal NV OS           Comments   DLS. 6/5/2017    PC IOL OU 2004  In NO monovision  OS is near  S/ P Yag Caps  S/P angle closure surgery Dr. Vega many years ago       Last edited by Patricio Small on 10/30/2017  9:55 AM. (History)      Pt states she is having more difficulty with OU burning. Feels it is   dryness causing it.  Denies any visual distortion, floaters or flashes at   present.       PC IOL OU 2004  In NO monovision  OS is near  S/ P Yag Caps  S/P angle closure surgery Dr. Vega many years ago          A/P    1. PPCNV OS  Not foveal threatening  Monitor    2. PVD OU    3. PCIOL OU    4. Prior angle closure  S/p Sx         6 months OCT

## 2017-11-10 ENCOUNTER — HOSPITAL ENCOUNTER (OUTPATIENT)
Dept: RADIOLOGY | Facility: HOSPITAL | Age: 82
Discharge: HOME | End: 2017-11-10
Attending: FAMILY MEDICINE
Payer: MEDICARE

## 2017-11-10 VITALS — BODY MASS INDEX: 20.51 KG/M2 | WEIGHT: 120.13 LBS | HEIGHT: 64 IN

## 2017-11-10 DIAGNOSIS — Z12.31 SCREENING MAMMOGRAM, ENCOUNTER FOR: ICD-10-CM

## 2017-11-10 PROCEDURE — 77067 SCR MAMMO BI INCL CAD: CPT | Mod: 26,,, | Performed by: RADIOLOGY

## 2017-11-10 PROCEDURE — 77067 SCR MAMMO BI INCL CAD: CPT | Mod: TC

## 2017-11-10 PROCEDURE — 77063 BREAST TOMOSYNTHESIS BI: CPT | Mod: 26,,, | Performed by: RADIOLOGY

## 2017-11-28 ENCOUNTER — OFFICE VISIT (OUTPATIENT)
Dept: FAMILY MEDICINE | Facility: CLINIC | Age: 82
End: 2017-11-28
Payer: MEDICARE

## 2017-11-28 ENCOUNTER — OFFICE VISIT (OUTPATIENT)
Dept: PRIMARY CARE CLINIC | Facility: CLINIC | Age: 82
End: 2017-11-28
Payer: MEDICARE

## 2017-11-28 VITALS
WEIGHT: 123.25 LBS | SYSTOLIC BLOOD PRESSURE: 154 MMHG | WEIGHT: 123.25 LBS | HEIGHT: 64 IN | SYSTOLIC BLOOD PRESSURE: 142 MMHG | DIASTOLIC BLOOD PRESSURE: 72 MMHG | HEART RATE: 75 BPM | HEIGHT: 64 IN | HEART RATE: 75 BPM | TEMPERATURE: 98 F | BODY MASS INDEX: 21.04 KG/M2 | BODY MASS INDEX: 21.04 KG/M2 | DIASTOLIC BLOOD PRESSURE: 74 MMHG

## 2017-11-28 DIAGNOSIS — I70.0 AORTIC ATHEROSCLEROSIS: ICD-10-CM

## 2017-11-28 DIAGNOSIS — R05.9 COUGH: ICD-10-CM

## 2017-11-28 DIAGNOSIS — J06.9 UPPER RESPIRATORY TRACT INFECTION, UNSPECIFIED TYPE: Primary | ICD-10-CM

## 2017-11-28 DIAGNOSIS — R09.81 NASAL CONGESTION: ICD-10-CM

## 2017-11-28 DIAGNOSIS — I73.9 PERIPHERAL VASCULAR DISEASE: ICD-10-CM

## 2017-11-28 DIAGNOSIS — Z00.00 ENCOUNTER FOR PREVENTIVE HEALTH EXAMINATION: Primary | ICD-10-CM

## 2017-11-28 DIAGNOSIS — I10 ESSENTIAL HYPERTENSION: ICD-10-CM

## 2017-11-28 DIAGNOSIS — M81.0 OSTEOPOROSIS, POST-MENOPAUSAL: ICD-10-CM

## 2017-11-28 DIAGNOSIS — I77.9 CAROTID ARTERY DISEASE, UNSPECIFIED LATERALITY: ICD-10-CM

## 2017-11-28 DIAGNOSIS — E03.9 HYPOTHYROIDISM, UNSPECIFIED TYPE: ICD-10-CM

## 2017-11-28 DIAGNOSIS — Z85.3 HISTORY OF BREAST CANCER: ICD-10-CM

## 2017-11-28 PROCEDURE — 99214 OFFICE O/P EST MOD 30 MIN: CPT | Mod: S$GLB,,, | Performed by: NURSE PRACTITIONER

## 2017-11-28 PROCEDURE — G0439 PPPS, SUBSEQ VISIT: HCPCS | Mod: S$GLB,,, | Performed by: NURSE PRACTITIONER

## 2017-11-28 PROCEDURE — 99999 PR PBB SHADOW E&M-EST. PATIENT-LVL IV: CPT | Mod: PBBFAC,,, | Performed by: NURSE PRACTITIONER

## 2017-11-28 RX ORDER — AZITHROMYCIN 250 MG/1
TABLET, FILM COATED ORAL
Qty: 6 TABLET | Refills: 0 | Status: SHIPPED | OUTPATIENT
Start: 2017-11-28 | End: 2017-12-03

## 2017-11-28 NOTE — PATIENT INSTRUCTIONS
Adult Self-Care for Colds  Colds are caused by viruses. They can't be cured with antibiotics. However, you can ease symptoms and support your body's efforts to heal itself.  No matter which symptoms you have, be sure to:  · Drink plenty of fluids (water or clear soup)  · Stop smoking and drinking alcohol  · Get plenty of rest    Understand a fever  · Take your temperature several times a day. If your fever is 100.4°F (38.0°C) for more than a day, call your healthcare provider.  · Relax, lie down. Go to bed if you want. Just get off your feet and rest. Also, drink plenty of fluids to avoid dehydration.  · Take acetaminophen or a nonsteroidal anti-inflammatory agent (NSAID), such as ibuprofen.  Treat a troubled nose kindly  · Breathe steam or heated humidified air to open blocked nasal passages.  a hot shower or use a vaporizer. Be careful not to get burned by the steam.  · Saline nasal sprays and decongestant tablets help open a stuffy nose. Antihistamines can also help, but they can cause side effects such as drowsiness and drying of the eyes, nose, and mouth.  Soothe a sore throat and cough  · Gargle every 2 hours with 1/4 teaspoon of salt dissolved in 1/2 cup of warm water. Suck on throat lozenges and cough drops to moisten your throat.  · Cough medicines are available but it is unclear how well they actually work.  · Take acetaminophen or an NSAID, such as ibuprofen, to ease throat pain  Ease digestive problems  · Put fluids back into your body. Take frequent sips of clear liquids such as water or broth. Avoid drinks that have a lot of sugar in them, such as juices and sodas. These can make diarrhea worse. Older children and adults can drink sports drinks.  · As your appetite returns, you can resume your normal diet. Ask your healthcare provider if there are any foods you should avoid.  When to seek medical care  When you first notice symptoms, ask your healthcare provider if antiviral medicines are  appropriate. Antibiotics should not be taken for colds or flu. Also, call your healthcare provider if you have any of the following symptoms or if you aren't feeling better after 7 days:  · Shortness of breath  · Pain or pressure in the chest or belly (abdomen)  · Worsening symptoms, especially after a period of improvement  · Fever of 100.4°F  (38.0°C) or higher, or fever that doesn't go down with medicine  · Sudden dizziness or confusion  · Severe or continued vomiting  · Signs of dehydration, including extreme thirst, dark urine, infrequent urination, dry mouth  · Spotted, red, or very sore throat   Date Last Reviewed: 12/1/2016  © 4011-3633 [employer]. [address] [place] [address], [place] [address]. All rights reserved. This information is not intended as a substitute for professional medical care. Always follow your healthcare professional's instructions.    If you are not better in 4 days, if worse or you have concerns or questions, please do not hesitate to call.  You can reach us at [phone] Monday through Friday (except holidays) 12 nooon to 8 p.m.    Thank you for using the [facility]!    PHILIPPE Rodgers, [facility]-BC  [facility]  [facility]

## 2017-11-28 NOTE — PROGRESS NOTES
Subjective:       Patient ID: Silke Dodd is a 89 y.o. female.    Chief Complaint: Nasal Congestion (sx about week ago ); Cough; and Headache    Patient who is new to me presents with cough.       Cough   This is a new problem. The current episode started 1 to 4 weeks ago. The problem has been gradually worsening. The problem occurs hourly. The cough is productive of sputum. Associated symptoms include nasal congestion, postnasal drip and a sore throat. Pertinent negatives include no chest pain, chills, ear congestion, fever, headaches, heartburn, hemoptysis, myalgias, rash, shortness of breath, sweats or wheezing. Nothing aggravates the symptoms. She has tried OTC cough suppressant (allegra and mucinex) for the symptoms. There is no history of asthma, bronchitis, COPD, environmental allergies or pneumonia.     Review of Systems   Constitutional: Negative for chills, fatigue and fever.   HENT: Positive for congestion, postnasal drip and sore throat. Negative for sinus pain, sinus pressure and sneezing.    Respiratory: Positive for cough. Negative for hemoptysis, chest tightness, shortness of breath and wheezing.    Cardiovascular: Negative for chest pain, palpitations and leg swelling.   Gastrointestinal: Negative for abdominal distention, abdominal pain, constipation, diarrhea, heartburn, nausea and vomiting.   Genitourinary: Negative for decreased urine volume, difficulty urinating, dysuria, frequency and urgency.   Musculoskeletal: Negative for arthralgias, gait problem, joint swelling and myalgias.   Skin: Negative for rash and wound.   Allergic/Immunologic: Negative for environmental allergies.   Neurological: Negative for dizziness, light-headedness, numbness and headaches.       Objective:      Physical Exam   Constitutional: She is oriented to person, place, and time. Vital signs are normal. She appears well-developed and well-nourished. She is cooperative.   HENT:   Head: Normocephalic and atraumatic.    Right Ear: Hearing, tympanic membrane, external ear and ear canal normal.   Left Ear: Hearing, tympanic membrane, external ear and ear canal normal.   Nose: Rhinorrhea present. Right sinus exhibits no maxillary sinus tenderness and no frontal sinus tenderness. Left sinus exhibits no maxillary sinus tenderness and no frontal sinus tenderness.   Mouth/Throat: Posterior oropharyngeal erythema present.   Eyes: Pupils are equal, round, and reactive to light.   Neck: Normal range of motion.   Cardiovascular: Normal rate, regular rhythm, normal heart sounds and intact distal pulses.    Pulmonary/Chest: Effort normal and breath sounds normal.   Abdominal: Soft. Bowel sounds are normal.   Musculoskeletal: Normal range of motion.   Lymphadenopathy:        Head (right side): No submental, no submandibular, no tonsillar, no preauricular, no posterior auricular and no occipital adenopathy present.        Head (left side): No submental, no submandibular, no tonsillar, no preauricular, no posterior auricular and no occipital adenopathy present.     She has no cervical adenopathy.   Neurological: She is alert and oriented to person, place, and time.   Skin: Skin is warm and dry.   Nursing note and vitals reviewed.      Assessment:       1. Upper respiratory tract infection, unspecified type    2. Cough    3. Nasal congestion        Plan:           Upper respiratory tract infection, unspecified type  -     azithromycin (Z-JEFF) 250 MG tablet; Take 2 tablets by mouth on day 1; Take 1 tablet by mouth on days 2-5  Dispense: 6 tablet; Refill: 0    Cough  -     azithromycin (Z-JEFF) 250 MG tablet; Take 2 tablets by mouth on day 1; Take 1 tablet by mouth on days 2-5  Dispense: 6 tablet; Refill: 0    Nasal congestion  -     azithromycin (Z-JEFF) 250 MG tablet; Take 2 tablets by mouth on day 1; Take 1 tablet by mouth on days 2-5  Dispense: 6 tablet; Refill: 0  Patient to use Flonase BID.     Discussed OTCs. Patient to increase hydration and  rest. Patient to follow up if no improvement or worsening in symptoms.

## 2017-11-28 NOTE — PATIENT INSTRUCTIONS
Counseling and Referral of Other Preventative  (Italic type indicates deductible and co-insurance are waived)    Patient Name: Silke Dodd  Today's Date: 11/28/2017      SERVICE LIMITATIONS RECOMMENDATION    Vaccines    · Pneumococcal (once after 65)    · Influenza (annually)    · Hepatitis B (if medium/high risk)    · Prevnar 13      Hepatitis B medium/high risk factors:       - End-stage renal disease       - Hemophiliacs who received Factor VII or         IX concentrates       - Clients of institutions for the mentally             retarded       - Persons who live in the same house as          a HepB carrier       - Homosexual men       - Illicit injectable drug abusers     Pneumococcal: Done, no repeat necessary     Influenza: Done, repeat in one year     Hepatitis B: N/A     Prevnar 13: Done, no repeat necessary    Mammogram (biennial age 50-74)  Annually (age 40 or over)  Done this year, repeat every year    Pap (up to age 70 and after 70 if unknown history or abnormal study last 10 years)    N/A     The USPSTF recommends against screening for cervical cancer in women older than age 65 years who have had adequate prior screening and are not otherwise at high risk for cervical cancer.      Colorectal cancer screening (to age 75)    · Fecal occult blood test (annual)  · Flexible sigmoidoscopy (5y)  · Screening colonoscopy (10y)  · Barium enema   N/A    Diabetes self-management training (no USPSTF recommendations)  Requires referral by treating physician for patient with diabetes or renal disease. 10 hours of initial DSMT sessions of no less than 30 minutes each in a continuous 12-month period. 2 hours of follow-up DSMT in subsequent years.  N/A    Bone mass measurements (age 65 & older, biennial)  Requires diagnosis related to osteoporosis or estrogen deficiency. Biennial benefit unless patient has history of long-term glucocorticoid  N/A    Glaucoma screening (no USPSTF recommendation)  Diabetes mellitus,  family history   , age 50 or over    American, age 65 or over  Recommend follow up with eye care professional regularly    Medical nutrition therapy for diabetes or renal disease (no recommended schedule)  Requires referral by treating physician for patient with diabetes or renal disease or kidney transplant within the past 3 years.  Can be provided in same year as diabetes self-management training (DSMT), and CMS recommends medical nutrition therapy take place after DSMT. Up to 3 hours for initial year and 2 hours in subsequent years.  N/A    Cardiovascular screening blood tests (every 5 years)  · Fasting lipid panel  Order as a panel if possible  Done this year, repeat every year    Diabetes screening tests (at least every 3 years, Medicare covers annually or at 6-month intervals for prediabetic patients)  · Fasting blood sugar (FBS) or glucose tolerance test (GTT)  Patient must be diagnosed with one of the following:       - Hypertension       - Dyslipidemia       - Obesity (BMI 30kg/m2)       - Previous elevated impaired FBS or GTT       ... or any two of the following:       - Overweight (BMI 25 but <30)       - Family history of diabetes       - Age 65 or older       - History of gestational diabetes or birth of baby weighing more than 9 pounds  Done this year, repeat every year    HIV screening (annually for increased risk patients)  · HIV-1 and HIV-2 by EIA, or CONCEPCION, rapid antibody test or oral mucosa transudate  Patients must be at increased risk for HIV infection per USPSTF guidelines or pregnant. Tests covered annually for patient at increased risk or as requested by the patient. Pregnant patients may receive up to 3 tests during pregnancy.  Risks discussed, screening is not recommended    Smoking cessation counseling (up to 8 sessions per year)  Patients must be asymptomatic of tobacco-related conditions to receive as a preventative service.  Non-smoker    Subsequent annual  wellness visit  At least 12 months since last AWV  Return in one year     The following information is provided to all patients.  This information is to help you find resources for any of the problems found today that may be affecting your health:                Living healthy guide: www.[facility].[place].[facility]      Understanding Diabetes: www.diabetes.org      Eating healthy: www.cdc.gov/healthyweight      CDC home safety checklist: www.cdc.gov/steadi/patient.html      Agency on Aging: www.goea.[place].[facility]      Alcoholics anonymous (AA): www.aa.org      Physical Activity: www.meghna.nih.gov/qg2wgwn      Tobacco use: www.quitwithusla.org

## 2017-12-07 PROBLEM — I10 ESSENTIAL HYPERTENSION: Status: ACTIVE | Noted: 2017-12-07

## 2017-12-07 PROBLEM — I77.9 CAROTID ARTERIAL DISEASE: Status: ACTIVE | Noted: 2017-12-07

## 2017-12-07 PROBLEM — I70.0 AORTIC ATHEROSCLEROSIS: Status: ACTIVE | Noted: 2017-12-07

## 2017-12-07 NOTE — PROGRESS NOTES
"Silke Dodd presented for a  Medicare AWV and comprehensive Health Risk Assessment today. The following components were reviewed and updated:    · Medical history  · Family History  · Social history  · Allergies and Current Medications  · Health Risk Assessment  · Health Maintenance  · Care Team     ** See Completed Assessments for Annual Wellness Visit within the encounter summary.**       The following assessments were completed:  · Living Situation  · CAGE  · Depression Screening  · Timed Get Up and Go  · Whisper Test  · Cognitive Function Screening          · Nutrition Screening  · ADL Screening  · PAQ Screening    Reports cough, congestion for several days.  Has appt to further eval with PCC immediately after HRA visit.     Vitals:    11/28/17 1500 11/28/17 1524   BP: (!) 164/68 (!) 154/72   BP Location: Left arm    Patient Position: Sitting    BP Method: Medium (Automatic)    Pulse: 75    Weight: 55.9 kg (123 lb 3.8 oz)    Height: 5' 4.02" (1.626 m)      Body mass index is 21.14 kg/m².  Physical Exam   Constitutional: She appears well-developed and well-nourished.   HENT:   Head: Normocephalic and atraumatic.   Eyes: Conjunctivae are normal.   Neck: Normal range of motion. Neck supple. No thyromegaly present.   Cardiovascular: Normal rate, regular rhythm and normal heart sounds.  Exam reveals no gallop and no friction rub.    No murmur heard.  Pulmonary/Chest: Effort normal and breath sounds normal. No respiratory distress. She has no wheezes. She has no rales.   Lymphadenopathy:     She has no cervical adenopathy.   Skin: Skin is warm and dry. Capillary refill takes less than 2 seconds.   Psychiatric: She has a normal mood and affect. Her behavior is normal.   Vitals reviewed.        Diagnoses and health risks identified today and associated recommendations/orders:    1. Encounter for preventive health examination  Reviewed health maintenance and provided recommendations        2. Peripheral vascular " disease  Stable.   Elevate feet while at rest  Followed by Ronnie Szymanski MD .       3. History of breast cancer  Stable.   Continue mammogram for surveillance  Followed by Brenda.       4. Hypothyroidism, unspecified type  Continue to monitor tsh  Followed by Ronnie Szymanski MD .       5. Osteoporosis, post-menopausal  Stable.   Taking ca + vit D  Followed by Ronnie Szymanski MD .     6.  Aortic Atherosclerosis  Continue to monitor lipids  Followed by Ronnie Szymanski MD .   Abd US 10/24/14    7.  Carotid artery disease  Continue to monitor with carotid US  Followed by Ronnie Szymanski MD .   Carotid US 3/28/14    8.  Essential hypertension  Elevated today  Has pcp appt scheduled     Provided Silke with a 5-10 year written screening schedule and personal prevention plan. Recommendations were developed using the USPSTF age appropriate recommendations. Education, counseling, and referrals were provided as needed. After Visit Summary printed and given to patient which includes a list of additional screenings\tests needed.    Return in about 1 year (around 11/28/2018).    Ayleen Gomez NP

## 2017-12-13 ENCOUNTER — OFFICE VISIT (OUTPATIENT)
Dept: FAMILY MEDICINE | Facility: CLINIC | Age: 82
End: 2017-12-13
Payer: MEDICARE

## 2017-12-13 VITALS
BODY MASS INDEX: 21.07 KG/M2 | RESPIRATION RATE: 16 BRPM | HEART RATE: 62 BPM | HEIGHT: 64 IN | WEIGHT: 123.44 LBS | SYSTOLIC BLOOD PRESSURE: 126 MMHG | DIASTOLIC BLOOD PRESSURE: 72 MMHG

## 2017-12-13 DIAGNOSIS — E03.9 HYPOTHYROIDISM, UNSPECIFIED TYPE: ICD-10-CM

## 2017-12-13 DIAGNOSIS — I10 ESSENTIAL HYPERTENSION: Primary | ICD-10-CM

## 2017-12-13 PROCEDURE — 99213 OFFICE O/P EST LOW 20 MIN: CPT | Mod: S$GLB,,, | Performed by: FAMILY MEDICINE

## 2017-12-13 PROCEDURE — 90670 PCV13 VACCINE IM: CPT | Mod: S$GLB,,, | Performed by: FAMILY MEDICINE

## 2017-12-13 PROCEDURE — G0009 ADMIN PNEUMOCOCCAL VACCINE: HCPCS | Mod: S$GLB,,, | Performed by: FAMILY MEDICINE

## 2017-12-13 PROCEDURE — 99999 PR PBB SHADOW E&M-EST. PATIENT-LVL III: CPT | Mod: PBBFAC,,, | Performed by: FAMILY MEDICINE

## 2017-12-13 PROCEDURE — 99499 UNLISTED E&M SERVICE: CPT | Mod: S$GLB,,, | Performed by: FAMILY MEDICINE

## 2017-12-13 NOTE — PROGRESS NOTES
Subjective:       Patient ID: Silke Dodd is a 89 y.o. female    Chief Complaint: Hypertension (follow up; hm due: tetanus, zoster, prevnar)    Hypertension   This is a chronic problem. The problem is controlled. Pertinent negatives include no chest pain, headaches, orthopnea, palpitations, PND or shortness of breath. Past treatments include beta blockers. The current treatment provides significant improvement. There are no compliance problems.        Review of Systems   Respiratory: Negative for shortness of breath.    Cardiovascular: Negative for chest pain, palpitations, orthopnea and PND.   Neurological: Negative for headaches.        Objective:   Physical Exam   Constitutional: She is oriented to person, place, and time. She appears well-developed and well-nourished.   Neurological: She is alert and oriented to person, place, and time.   Vitals reviewed.    Assessment:       1. Essential hypertension  Comprehensive metabolic panel    Lipid panel   2. Hypothyroidism, unspecified type  TSH        Plan:       Essential hypertension  - Continue current therapy  - Serial blood pressure monitoring  - Diet and exercise education.    Hypothyroidism, unspecified type  - Continue current therapy    Other orders  -     Pneumococcal Conjugate Vaccine (13 Valent) (IM)

## 2018-01-22 ENCOUNTER — TELEPHONE (OUTPATIENT)
Dept: PODIATRY | Facility: CLINIC | Age: 83
End: 2018-01-22

## 2018-01-22 NOTE — TELEPHONE ENCOUNTER
----- Message from Marianna Siddiqui sent at 1/22/2018  9:18 AM CST -----  Contact: Patient  Patient would like to speak with someone to see if the doctor would work on a fungus too.  Patient has an appointment on 1/24.  Call Back#[phone]  Thanks

## 2018-01-24 ENCOUNTER — OFFICE VISIT (OUTPATIENT)
Dept: PODIATRY | Facility: CLINIC | Age: 83
End: 2018-01-24
Payer: MEDICARE

## 2018-01-24 VITALS — BODY MASS INDEX: 21.34 KG/M2 | WEIGHT: 125 LBS | HEIGHT: 64 IN

## 2018-01-24 DIAGNOSIS — I73.9 PERIPHERAL VASCULAR DISEASE: Primary | ICD-10-CM

## 2018-01-24 DIAGNOSIS — M20.42 HAMMER TOES OF BOTH FEET: ICD-10-CM

## 2018-01-24 DIAGNOSIS — B35.1 ONYCHOMYCOSIS DUE TO DERMATOPHYTE: ICD-10-CM

## 2018-01-24 DIAGNOSIS — M20.41 HAMMER TOES OF BOTH FEET: ICD-10-CM

## 2018-01-24 PROCEDURE — 99213 OFFICE O/P EST LOW 20 MIN: CPT | Mod: 25,S$GLB,, | Performed by: PODIATRIST

## 2018-01-24 PROCEDURE — 99499 UNLISTED E&M SERVICE: CPT | Mod: S$GLB,,, | Performed by: PODIATRIST

## 2018-01-24 PROCEDURE — 11721 DEBRIDE NAIL 6 OR MORE: CPT | Mod: Q9,S$GLB,, | Performed by: PODIATRIST

## 2018-01-24 PROCEDURE — 99999 PR PBB SHADOW E&M-EST. PATIENT-LVL III: CPT | Mod: PBBFAC,,, | Performed by: PODIATRIST

## 2018-01-25 NOTE — PROGRESS NOTES
Subjective:      Patient ID: Silke Dodd is a 89 y.o. female.    Chief Complaint: Nail Problem (pain with closed toe shoes. )    Silke is a 89 y.o. female who presents to the clinic for evaluation and treatment of high risk feet. 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 The patient's chief complaint is long, thick toenails.  Relates having significant aching pain in the toenails with wearing closed toe shoes.  Currently rates pain as a 0/10.  Symptoms are alleviated with doffing of shoe gear.  Has attempted to self treat with topical antifungals with minimal improvement in the nail plates.  Denies any additional pedal complaints.   This patient has documented high risk feet requiring routine maintenance secondary to peripheral vascular disease.    PCP: Ronnie Szymanski MD    Date Last Seen by PCP: 12/13/17    No results found for: HGBA1C      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    Panic disorder        Past Surgical History:   Procedure Laterality Date    CATARACT EXTRACTION Bilateral     corrected for monovision    HYSTERECTOMY  1975    Cancer cells    MASTECTOMY Right 2015    TOTAL ABDOMINAL HYSTERECTOMY W/ BILATERAL SALPINGOOPHORECTOMY  1975       Family History   Problem Relation Age of Onset    Cancer Mother     Breast cancer Mother     Diabetes Father     Stroke Father     Coronary artery disease Father        Social History     Social History    Marital status:      Spouse name: N/A    Number of children: 3    Years of education: N/A     Social History Main Topics    Smoking status: Never Smoker    Smokeless tobacco:  Never Used    Alcohol use No    Drug use: No    Sexual activity: No     Other Topics Concern    None     Social History Narrative    None       Current Outpatient Prescriptions   Medication Sig Dispense Refill    aspirin (ECOTRIN) 81 MG EC tablet Take 81 mg by mouth Daily.      CALCIUM CARBONATE/VITAMIN D3 (VITAMIN D-3 ORAL) Take by mouth.      cholecalciferol, vitamin D3, (VITAMIN D3) 5,000 unit Tab Take 5,000 Units by mouth once daily.      escitalopram oxalate (LEXAPRO) 10 MG tablet Take 1 tablet (10 mg total) by mouth once daily. 90 tablet 3    levothyroxine (SYNTHROID) 75 MCG tablet Take 1 tablet (75 mcg total) by mouth once daily. 90 tablet 3    metoprolol succinate (TOPROL-XL) 25 MG 24 hr tablet Take 0.5 tablets (12.5 mg total) by mouth once daily. 45 tablet 3    ranitidine (ZANTAC) 150 MG tablet Take 1 tablet (150 mg total) by mouth 2 (two) times daily. 180 tablet 3     No current facility-administered medications for this visit.        Review of patient's allergies indicates:  No Known Allergies      Review of Systems   Constitution: Negative for chills.   Cardiovascular: Negative for claudication and leg swelling.   Skin: Positive for color change and nail changes.   Musculoskeletal: Negative for joint pain and joint swelling.   Neurological: Negative for numbness and paresthesias.   Psychiatric/Behavioral: Negative for altered mental status.           Objective:      Physical Exam   Constitutional: She is oriented to person, place, and time. She appears well-developed and well-nourished. No distress.   Cardiovascular:   Pulses:       Dorsalis pedis pulses are 2+ on the right side, and 2+ on the left side.        Posterior tibial pulses are 1+ on the right side, and 1+ on the left side.   CFT 3 seconds bilateral.  Pedal hair growth decreased bilateral.   Mild varicosities noted bilateral.  Mild nonpitting edema noted to bilateral lower extremity.  Toes are cool to touch bilateral.      Musculoskeletal: She exhibits edema and tenderness.   Muscle strength 5/5 in all muscle groups bilateral.  No tenderness nor crepitation with ROM of foot/ankle joints bilateral.  Pain with palpation of toenail plates bilateral.  Bilateral hallux abducto valgus.  Bilateral semi-rigid contracture of toes 2-5.       Neurological: She is alert and oriented to person, place, and time. She has normal strength. No sensory deficit.   Light touch intact bilateral.     Skin: Skin is warm, dry and intact. Capillary refill takes less than 2 seconds. No abrasion, no bruising, no burn, no ecchymosis, no laceration, no lesion, no petechiae and no rash noted. She is not diaphoretic. No erythema. No pallor.   Pedal skin appears thin bilateral.  Toenails x 10 appear thickened by 2 mm's, elongated by 4 mm's and discolored with subungual debris. Examination of the skin reveals no evidence of significant maceration, rashes, open lesions, suspicious appearing nevi or other concerning lesions.              Assessment:       Encounter Diagnoses   Name Primary?    Peripheral vascular disease Yes    Onychomycosis due to dermatophyte     Hammer toes of both feet          Plan:       Silke was seen today for nail problem.    Diagnoses and all orders for this visit:    Peripheral vascular disease    Onychomycosis due to dermatophyte    Hammer toes of both feet      I counseled the patient on her conditions, their implications and medical management.      Patient advised to wear shoe gear that accommodates for digital deformities.    Patient instructed on proper foot hygeine. We discussed wearing proper shoe gear, daily foot inspections, never walking without protective shoe gear, never putting sharp instruments to feet.    Discussed with patient a variety of treatment options to address onychomycosis including Larry Vaporub and laser therapy.  Patient is amenable to daily application of vicks vaporub x 6 months.    Advised to regularly clean  shoe gear and shower surfaces to limit exposure.    Advised to be wary of walking barefoot on carpeted surfaces at gyms and hotel rooms.  Also, avoid barefoot walking at public showers and pool areas.    With patient's permission, nails were aggressively reduced and debrided x 10 to their soft tissue attachment mechanically and with electric , removing all offending nail and debris. Patient relates relief following the procedure. She will continue to monitor the areas daily, inspect her feet, wear protective shoe gear when ambulatory, moisturizer to maintain skin integrity and follow in this office in approximately 2-3 months, sooner p.r.n.      Follow-up in about 3 months (around 4/24/2018).    Narayan Jonas DPM

## 2018-02-23 RX ORDER — CLONAZEPAM 0.5 MG/1
TABLET ORAL
Qty: 60 TABLET | Refills: 0 | Status: SHIPPED | OUTPATIENT
Start: 2018-02-23 | End: 2018-08-02

## 2018-02-23 NOTE — TELEPHONE ENCOUNTER
Attempted to contact patient. Left message to call clinic back. I do not see where patient is taking Clonazepam.

## 2018-02-23 NOTE — TELEPHONE ENCOUNTER
----- Message from Devin Anne sent at 2/23/2018  9:06 AM CST -----  Contact: Patient  Silke, [phone], requesting a script for clonazepam 0.5mg tablet. Please advise. Thanks.    [id number]  [address]  [facility] [place] [address]  Phone: [phone] Fax: [phone]

## 2018-03-22 ENCOUNTER — TELEPHONE (OUTPATIENT)
Dept: FAMILY MEDICINE | Facility: CLINIC | Age: 83
End: 2018-03-22

## 2018-03-22 NOTE — TELEPHONE ENCOUNTER
Attempted to contact pt to let her know her medication is here at the clinic. Left message on voicemail for pt to return call to clinic.    Medication is on my desk in brown bag.

## 2018-03-23 NOTE — TELEPHONE ENCOUNTER
Spoke to patient and stated to her that her prescription was here at the clinic, and ready for her pickup. Pt verbalized understanding.

## 2018-03-23 NOTE — TELEPHONE ENCOUNTER
----- Message from Kay Dillard sent at 3/22/2018  4:36 PM CDT -----  Contact: patient   Patient returning a missed call. Call to pod. No answer.   Call back   Thanks!

## 2018-04-03 ENCOUNTER — TELEPHONE (OUTPATIENT)
Dept: PODIATRY | Facility: CLINIC | Age: 83
End: 2018-04-03

## 2018-04-03 ENCOUNTER — PATIENT MESSAGE (OUTPATIENT)
Dept: OPHTHALMOLOGY | Facility: CLINIC | Age: 83
End: 2018-04-03

## 2018-04-03 NOTE — TELEPHONE ENCOUNTER
Spoke with patient , explained that her appointment on 4/24/18 would have to be rescheduled, states she will call back to reschedule.

## 2018-04-16 ENCOUNTER — OFFICE VISIT (OUTPATIENT)
Dept: PODIATRY | Facility: CLINIC | Age: 83
End: 2018-04-16
Payer: MEDICARE

## 2018-04-16 ENCOUNTER — OFFICE VISIT (OUTPATIENT)
Dept: OPHTHALMOLOGY | Facility: CLINIC | Age: 83
End: 2018-04-16
Payer: MEDICARE

## 2018-04-16 VITALS — HEIGHT: 64 IN | BODY MASS INDEX: 21.34 KG/M2 | WEIGHT: 125 LBS

## 2018-04-16 DIAGNOSIS — M20.41 HAMMER TOES OF BOTH FEET: ICD-10-CM

## 2018-04-16 DIAGNOSIS — H35.052 CHOROIDAL NEOVASCULARIZATION, LEFT EYE: ICD-10-CM

## 2018-04-16 DIAGNOSIS — H43.813 POSTERIOR VITREOUS DETACHMENT, BOTH EYES: ICD-10-CM

## 2018-04-16 DIAGNOSIS — I73.9 PERIPHERAL VASCULAR DISEASE: Primary | ICD-10-CM

## 2018-04-16 DIAGNOSIS — B35.1 ONYCHOMYCOSIS DUE TO DERMATOPHYTE: ICD-10-CM

## 2018-04-16 DIAGNOSIS — M20.42 HAMMER TOES OF BOTH FEET: ICD-10-CM

## 2018-04-16 DIAGNOSIS — G60.9 IDIOPATHIC PERIPHERAL NEUROPATHY: ICD-10-CM

## 2018-04-16 DIAGNOSIS — H43.813 POSTERIOR VITREOUS DETACHMENT OF BOTH EYES: Primary | ICD-10-CM

## 2018-04-16 PROCEDURE — 99999 PR PBB SHADOW E&M-EST. PATIENT-LVL II: CPT | Mod: PBBFAC,,, | Performed by: OPHTHALMOLOGY

## 2018-04-16 PROCEDURE — 92226 PR SPECIAL EYE EXAM, SUBSEQUENT: CPT | Mod: 50,S$GLB,, | Performed by: OPHTHALMOLOGY

## 2018-04-16 PROCEDURE — 92014 COMPRE OPH EXAM EST PT 1/>: CPT | Mod: S$GLB,,, | Performed by: OPHTHALMOLOGY

## 2018-04-16 PROCEDURE — 99999 PR PBB SHADOW E&M-EST. PATIENT-LVL III: CPT | Mod: PBBFAC,,, | Performed by: PODIATRIST

## 2018-04-16 PROCEDURE — 92134 CPTRZ OPH DX IMG PST SGM RTA: CPT | Mod: S$GLB,,, | Performed by: OPHTHALMOLOGY

## 2018-04-16 PROCEDURE — 11721 DEBRIDE NAIL 6 OR MORE: CPT | Mod: Q9,S$GLB,, | Performed by: PODIATRIST

## 2018-04-16 PROCEDURE — 99499 UNLISTED E&M SERVICE: CPT | Mod: S$GLB,,, | Performed by: PODIATRIST

## 2018-04-16 PROCEDURE — 99499 UNLISTED E&M SERVICE: CPT | Mod: S$PBB,,, | Performed by: PODIATRIST

## 2018-04-16 NOTE — PROGRESS NOTES
HPI     Concerns About Ocular Health    Additional comments: 6 mon PVD chk           Comments         A/P    1. PPCNV OS  Not foveal threatening  Monitor    2. PVD OU    3. PCIOL OU    4. Prior angle closure  S/p Sx         12 months OCT

## 2018-04-17 NOTE — PROGRESS NOTES
Subjective:      Patient ID: Silke Dodd is a 89 y.o. female.    Chief Complaint: Peripheral Vascular Disease (Stephon 12/13/17)    Silke is a 89 y.o. female who presents to the clinic for evaluation and treatment of high risk feet. 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 The patient's chief complaint is long, thick toenails.  Denies being painful with wearing shoe gear.  Has not attempted to self treat.  Denies any additional pedal complaints.   This patient has documented high risk feet requiring routine maintenance secondary to peripheral vascular disease.    PCP: Ronnie Szymanski MD    Date Last Seen by PCP: 12/13/17    No results found for: HGBA1C      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    Panic disorder        Past Surgical History:   Procedure Laterality Date    CATARACT EXTRACTION Bilateral     corrected for monovision    HYSTERECTOMY  1975    Cancer cells    MASTECTOMY Right 2015    TOTAL ABDOMINAL HYSTERECTOMY W/ BILATERAL SALPINGOOPHORECTOMY  1975       Family History   Problem Relation Age of Onset    Cancer Mother     Breast cancer Mother     Diabetes Father     Stroke Father     Coronary artery disease Father        Social History     Social History    Marital status:      Spouse name: N/A    Number of children: 3    Years of education: N/A     Social History Main Topics    Smoking status: Never Smoker    Smokeless tobacco: Never Used    Alcohol use No    Drug use: No    Sexual activity: No     Other Topics Concern    None     Social History Narrative    None       Current Outpatient Prescriptions    Medication Sig Dispense Refill    aspirin (ECOTRIN) 81 MG EC tablet Take 81 mg by mouth Daily.      CALCIUM CARBONATE/VITAMIN D3 (VITAMIN D-3 ORAL) Take by mouth.      cholecalciferol, vitamin D3, (VITAMIN D3) 5,000 unit Tab Take 5,000 Units by mouth once daily.      clonazePAM (KLONOPIN) 0.5 MG tablet TAKE 1 TABLET BY MOUTH TWICE A DAY IF NEEDED FOR ANXIETY 60 tablet 0    escitalopram oxalate (LEXAPRO) 10 MG tablet Take 1 tablet (10 mg total) by mouth once daily. 90 tablet 3    levothyroxine (SYNTHROID) 75 MCG tablet Take 1 tablet (75 mcg total) by mouth once daily. 90 tablet 3    metoprolol succinate (TOPROL-XL) 25 MG 24 hr tablet Take 0.5 tablets (12.5 mg total) by mouth once daily. 45 tablet 3    ranitidine (ZANTAC) 150 MG tablet Take 1 tablet (150 mg total) by mouth 2 (two) times daily. 180 tablet 3     No current facility-administered medications for this visit.        Review of patient's allergies indicates:  No Known Allergies      Review of Systems   Constitution: Negative for chills.   Cardiovascular: Negative for claudication and leg swelling.   Skin: Positive for color change and nail changes.   Musculoskeletal: Negative for joint pain and joint swelling.   Neurological: Negative for numbness and paresthesias.   Psychiatric/Behavioral: Negative for altered mental status.           Objective:      Physical Exam   Constitutional: She is oriented to person, place, and time. She appears well-developed and well-nourished. No distress.   Cardiovascular:   Pulses:       Dorsalis pedis pulses are 2+ on the right side, and 2+ on the left side.        Posterior tibial pulses are 1+ on the right side, and 1+ on the left side.   CFT 3 seconds bilateral.  Pedal hair growth decreased bilateral.   Mild varicosities noted bilateral.  Mild nonpitting edema noted to bilateral lower extremity.  Toes are cool to touch bilateral.     Musculoskeletal: She exhibits edema. She exhibits no tenderness.   Muscle strength  5/5 in all muscle groups bilateral.  No tenderness nor crepitation with ROM of foot/ankle joints bilateral.  No pain with palpation of bilateral foot and ankle.  Bilateral hallux abducto valgus.  Bilateral semi-rigid contracture of toes 2-5.       Neurological: She is alert and oriented to person, place, and time. She has normal strength. A sensory deficit is present.   Protective sensation per [place] monofilament is decreased bilateral.  Vibratory sensation is absent on the Rt. And decreased on the Lt.  Light touch is intact bilateral.   Skin: Skin is warm, dry and intact. Capillary refill takes less than 2 seconds. No abrasion, no bruising, no burn, no ecchymosis, no laceration, no lesion, no petechiae and no rash noted. She is not diaphoretic. No erythema. No pallor.   Pedal skin appears thin bilateral.  Toenails x 10 appear thickened by 2 mm's, elongated by 2 mm's and discolored with subungual debris. Examination of the skin reveals no evidence of significant maceration, rashes, open lesions, suspicious appearing nevi or other concerning lesions.              Assessment:       Encounter Diagnoses   Name Primary?    Peripheral vascular disease Yes    Onychomycosis due to dermatophyte     Hammer toes of both feet     Idiopathic peripheral neuropathy          Plan:       Silke was seen today for peripheral vascular disease.    Diagnoses and all orders for this visit:    Peripheral vascular disease    Onychomycosis due to dermatophyte    Hammer toes of both feet    Idiopathic peripheral neuropathy      I counseled the patient on her conditions, their implications and medical management.    Patient's neurologic portion of the physical exam was indicative of neuropathy.  Being that the patient is asymptomatic, no further treatment of this condition is warranted.      Patient advised to wear shoe gear that accommodates for digital deformities.    With patient's permission, nails were aggressively reduced  and debrided x 10 to their soft tissue attachment mechanically and with electric , removing all offending nail and debris. Patient relates relief following the procedure. She will continue to monitor the areas daily, inspect her feet, wear protective shoe gear when ambulatory, moisturizer to maintain skin integrity and follow in this office in approximately 2-3 months, sooner p.r.n.    Follow-up in about 3 months (around 7/16/2018).    Narayan Jonas DPM

## 2018-06-20 ENCOUNTER — OFFICE VISIT (OUTPATIENT)
Dept: FAMILY MEDICINE | Facility: CLINIC | Age: 83
End: 2018-06-20
Payer: MEDICARE

## 2018-06-20 ENCOUNTER — LAB VISIT (OUTPATIENT)
Dept: LAB | Facility: HOSPITAL | Age: 83
End: 2018-06-20
Attending: FAMILY MEDICINE
Payer: MEDICARE

## 2018-06-20 VITALS
SYSTOLIC BLOOD PRESSURE: 130 MMHG | BODY MASS INDEX: 21.79 KG/M2 | WEIGHT: 127.63 LBS | RESPIRATION RATE: 16 BRPM | HEART RATE: 70 BPM | HEIGHT: 64 IN | OXYGEN SATURATION: 96 % | DIASTOLIC BLOOD PRESSURE: 75 MMHG

## 2018-06-20 DIAGNOSIS — E03.9 HYPOTHYROIDISM, UNSPECIFIED TYPE: ICD-10-CM

## 2018-06-20 DIAGNOSIS — H91.90 HEARING LOSS, UNSPECIFIED HEARING LOSS TYPE, UNSPECIFIED LATERALITY: ICD-10-CM

## 2018-06-20 DIAGNOSIS — E03.9 HYPOTHYROIDISM, UNSPECIFIED TYPE: Primary | ICD-10-CM

## 2018-06-20 LAB — TSH SERPL DL<=0.005 MIU/L-ACNC: 0.76 UIU/ML

## 2018-06-20 PROCEDURE — 36415 COLL VENOUS BLD VENIPUNCTURE: CPT | Mod: PO

## 2018-06-20 PROCEDURE — 99213 OFFICE O/P EST LOW 20 MIN: CPT | Mod: S$GLB,,, | Performed by: FAMILY MEDICINE

## 2018-06-20 PROCEDURE — 99499 UNLISTED E&M SERVICE: CPT | Mod: S$PBB,,, | Performed by: FAMILY MEDICINE

## 2018-06-20 PROCEDURE — 84443 ASSAY THYROID STIM HORMONE: CPT

## 2018-06-20 PROCEDURE — 99999 PR PBB SHADOW E&M-EST. PATIENT-LVL IV: CPT | Mod: PBBFAC,,, | Performed by: FAMILY MEDICINE

## 2018-06-20 NOTE — PROGRESS NOTES
Subjective:       Patient ID: Silke Dodd is a 89 y.o. female    Chief Complaint: Follow-up (6 month f/u )    HPI    Review of Systems  Hot flashes since stopping hormone replacement    Objective:   Physical Exam     Assessment:       1. Hypothyroidism, unspecified type  TSH        Plan:       Hypothyroidism, unspecified type  -     TSH; Future; Expected date: 06/20/2018

## 2018-06-28 ENCOUNTER — PES CALL (OUTPATIENT)
Dept: ADMINISTRATIVE | Facility: CLINIC | Age: 83
End: 2018-06-28

## 2018-06-28 NOTE — PROGRESS NOTES
Subjective:       Patient ID: Silke Dodd is a 89 y.o. female    Chief Complaint: Follow-up (6 month f/u )    HPI  Here today for interval evaluation  She has no new complaints.   She does report some increased hearing loss.  Requesting to see Audiology    Review of Systems     Objective:   Physical Exam   Constitutional: She appears well-developed and well-nourished.   HENT:   Head: Normocephalic and atraumatic.   Eyes: Conjunctivae and EOM are normal. Pupils are equal, round, and reactive to light. No scleral icterus.   Neck: Normal range of motion. Neck supple. No thyromegaly present.   Cardiovascular: Normal rate, regular rhythm and normal heart sounds.  Exam reveals no gallop and no friction rub.    No murmur heard.  Pulmonary/Chest: Effort normal and breath sounds normal. No respiratory distress. She has no wheezes. She has no rales.   Lymphadenopathy:     She has no cervical adenopathy.   Vitals reviewed.       Assessment:       1. Hypothyroidism, unspecified type  TSH   2. Hearing loss, unspecified hearing loss type, unspecified laterality  Ambulatory consult to Audiology        Plan:       Hypothyroidism, unspecified type  -     TSH; Future; Expected date: 06/20/2018  - Continue current therapy     Hearing loss, unspecified hearing loss type, unspecified laterality  -     Ambulatory consult to Audiology

## 2018-07-02 ENCOUNTER — CLINICAL SUPPORT (OUTPATIENT)
Dept: AUDIOLOGY | Facility: CLINIC | Age: 83
End: 2018-07-02
Payer: MEDICARE

## 2018-07-02 DIAGNOSIS — H90.3 ASYMMETRIC SNHL (SENSORINEURAL HEARING LOSS): Primary | ICD-10-CM

## 2018-07-02 DIAGNOSIS — H93.13 BILATERAL TINNITUS: ICD-10-CM

## 2018-07-02 PROCEDURE — 99999 PR PBB SHADOW E&M-EST. PATIENT-LVL I: CPT | Mod: PBBFAC,,,

## 2018-07-02 PROCEDURE — 92557 COMPREHENSIVE HEARING TEST: CPT | Mod: S$GLB,,, | Performed by: AUDIOLOGIST

## 2018-07-02 PROCEDURE — 92567 TYMPANOMETRY: CPT | Mod: S$GLB,,, | Performed by: AUDIOLOGIST

## 2018-07-02 NOTE — PROGRESS NOTES
Silke Dodd was seen 07/02/2018 for an audiological evaluation.     Patient does not feel that her hearing loss is very advanced however her family members have reported decreased hearing for several years.  She feels that people's voices sound like they are mumbling and unclear at times.  Her daughter reported that the TV is set louder than it should be for her mother to hear.  Pt is worried that she will need hearing aids. Pt also reported intermittent, alternating tinnitus in each ear.  She reportedly had a hearing test done with Dr. Eliseo Aggarwal approximately three to four years ago but was unsure of the results of that test.     Results revealed a mild-to-severe asymmetrical sensorineural hearing loss bilaterally with poorer hearing in the left ear.  Speech Reception Thresholds were 35 dBHL for the right ear and 40 dBHL for the left ear.  Word recognition scores were excellent (100%) for the right ear and good (92%) for the left ear at a 40dBSL level and were good (92%) for the right ear and fair (80%) for the left ear at 55dBHL.   Tympanograms were Type A for the right ear and Type A for the left ear.    Audiogram results were reviewed in detail with patient and all questions were answered. Results will be forwarded to Dr. Szymanski after the audiogram is complete.    Impression:   - Asymmetrical bilateral sensorineural hearing loss  - Bilateral tinnitus    Recommendations:   - Medical consult due to asymmetrical sensorineural hearing loss with consideration of radiographic imaging to rule out a retrocochlear pathology  - Binaural amplification pending medical clearance  - Annual audiogram to monitor hearing loss    The patient and her daughter were provided with the contact information for the [facility] and will contact the office when ready to schedule a hearing aid consultation appointment.

## 2018-07-18 RX ORDER — LEVOTHYROXINE SODIUM 75 UG/1
75 TABLET ORAL DAILY
Qty: 90 TABLET | Refills: 3 | Status: SHIPPED | OUTPATIENT
Start: 2018-07-18 | End: 2018-12-03 | Stop reason: SDUPTHER

## 2018-08-02 ENCOUNTER — OFFICE VISIT (OUTPATIENT)
Dept: PODIATRY | Facility: CLINIC | Age: 83
End: 2018-08-02
Payer: MEDICARE

## 2018-08-02 ENCOUNTER — OFFICE VISIT (OUTPATIENT)
Dept: FAMILY MEDICINE | Facility: CLINIC | Age: 83
End: 2018-08-02
Payer: MEDICARE

## 2018-08-02 VITALS
HEART RATE: 65 BPM | SYSTOLIC BLOOD PRESSURE: 149 MMHG | WEIGHT: 127.63 LBS | DIASTOLIC BLOOD PRESSURE: 71 MMHG | HEIGHT: 64 IN | WEIGHT: 125 LBS | HEIGHT: 64 IN | BODY MASS INDEX: 21.34 KG/M2 | BODY MASS INDEX: 21.79 KG/M2

## 2018-08-02 DIAGNOSIS — I73.9 PERIPHERAL VASCULAR DISEASE: ICD-10-CM

## 2018-08-02 DIAGNOSIS — I10 ESSENTIAL HYPERTENSION: ICD-10-CM

## 2018-08-02 DIAGNOSIS — B35.1 ONYCHOMYCOSIS DUE TO DERMATOPHYTE: ICD-10-CM

## 2018-08-02 DIAGNOSIS — I73.9 PERIPHERAL VASCULAR DISEASE: Primary | ICD-10-CM

## 2018-08-02 DIAGNOSIS — E03.9 HYPOTHYROIDISM, UNSPECIFIED TYPE: ICD-10-CM

## 2018-08-02 DIAGNOSIS — I70.0 AORTIC ATHEROSCLEROSIS: ICD-10-CM

## 2018-08-02 DIAGNOSIS — I77.9 CAROTID ARTERY DISEASE, UNSPECIFIED LATERALITY: ICD-10-CM

## 2018-08-02 DIAGNOSIS — M20.42 HAMMER TOES OF BOTH FEET: ICD-10-CM

## 2018-08-02 DIAGNOSIS — G60.9 IDIOPATHIC PERIPHERAL NEUROPATHY: ICD-10-CM

## 2018-08-02 DIAGNOSIS — Z00.00 ENCOUNTER FOR PREVENTIVE HEALTH EXAMINATION: Primary | ICD-10-CM

## 2018-08-02 DIAGNOSIS — M20.41 HAMMER TOES OF BOTH FEET: ICD-10-CM

## 2018-08-02 PROBLEM — K30 NONULCER DYSPEPSIA: Status: ACTIVE | Noted: 2018-08-02

## 2018-08-02 PROBLEM — K21.9 GASTROESOPHAGEAL REFLUX DISEASE: Status: ACTIVE | Noted: 2018-08-02

## 2018-08-02 PROCEDURE — 11721 DEBRIDE NAIL 6 OR MORE: CPT | Mod: S$GLB,,, | Performed by: PODIATRIST

## 2018-08-02 PROCEDURE — 99499 UNLISTED E&M SERVICE: CPT | Mod: Q9,S$GLB,, | Performed by: PODIATRIST

## 2018-08-02 PROCEDURE — 99999 PR PBB SHADOW E&M-EST. PATIENT-LVL III: CPT | Mod: PBBFAC,,, | Performed by: PODIATRIST

## 2018-08-02 PROCEDURE — G0439 PPPS, SUBSEQ VISIT: HCPCS | Mod: S$GLB,,, | Performed by: NURSE PRACTITIONER

## 2018-08-02 PROCEDURE — 99999 PR PBB SHADOW E&M-EST. PATIENT-LVL IV: CPT | Mod: PBBFAC,,, | Performed by: NURSE PRACTITIONER

## 2018-08-02 NOTE — PROGRESS NOTES
"Silke Dodd presented for a  Medicare AWV and comprehensive Health Risk Assessment today. The following components were reviewed and updated:    · Medical history  · Family History  · Social history  · Allergies and Current Medications  · Health Risk Assessment  · Health Maintenance  · Care Team     Review of Systems   Constitutional: Negative for chills, fever, malaise/fatigue and weight loss.   Respiratory: Negative for cough and wheezing.    Cardiovascular: Negative for chest pain.   Gastrointestinal: Negative for abdominal pain, blood in stool, constipation, diarrhea, nausea and vomiting.   Skin: Negative for rash.   Neurological: Negative for dizziness, weakness and headaches.     ** See Completed Assessments for Annual Wellness Visit within the encounter summary.**     The following assessments were completed:  · Living Situation  · CAGE  · Depression Screening  · Timed Get Up and Go  · Whisper Test  · Cognitive Function Screening      · Nutrition Screening  · ADL Screening  · PAQ Screening    Vitals:    08/02/18 0944   BP: (!) 149/71   BP Location: Left arm   Patient Position: Sitting   BP Method: Medium (Automatic)   Pulse: 65   Weight: 56.7 kg (125 lb)   Height: 5' 4" (1.626 m)     Body mass index is 21.46 kg/m².  Physical Exam   Constitutional: She is oriented to person, place, and time. She appears well-nourished.   Cardiovascular: Normal rate, regular rhythm, normal heart sounds and intact distal pulses.    Pulmonary/Chest: Effort normal and breath sounds normal.   Neurological: She is alert and oriented to person, place, and time.   Skin: Skin is warm and dry.   Vitals reviewed.        Diagnoses and health risks identified today and associated recommendations/orders:    1. Encounter for preventive health examination  Reviewed and discussed health maintenance.   Written Shingrix rx given to patient with one refill    2. Essential hypertension  Elevated. Followed by cardiology (Dr. Gayle Cordova)     3. " Carotid artery disease, unspecified laterality  Stable- continue current treatment and follow up routinely with PCP and cardiology (Dr. Cordova)    4. Aortic atherosclerosis  Stable- continue current treatment and follow up routinely with PCP and cardiology (Dr. Cordova)    5. Peripheral vascular disease  Stable- continue current treatment and follow up routinely with PCP and cardiology (Dr. Cordova)    6. Hypothyroidism, unspecified type  Stable- continue current treatment and follow up routinely with PCP     Provided Silke with a 5-10 year written screening schedule and personal prevention plan. Recommendations were developed using the USPSTF age appropriate recommendations. Education, counseling, and referrals were provided as needed. After Visit Summary printed and given to patient which includes a list of additional screenings\tests needed.    Michelle De, NP

## 2018-08-03 NOTE — PROGRESS NOTES
Subjective:      Patient ID: Silke Dodd is a 89 y.o. female.    Chief Complaint: Peripheral Vascular Disease (Stephon 6/20/18 ) and Nail Care    Silke is a 89 y.o. female who presents to the clinic for evaluation and treatment of high risk feet. 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 The patient's chief complaint is long, thick toenails.  Denies being painful with wearing shoe gear.  Has not attempted to self treat.  Continues to note numbness in the Rt. Foot, however, she does not associate this with overt symptoms of pain.  Denies any additional pedal complaints.       PCP: Ronnie Szymanski MD    Date Last Seen by PCP: 6/20/18    No results found for: HGBA1C      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    Panic disorder        Past Surgical History:   Procedure Laterality Date    CATARACT EXTRACTION Bilateral     corrected for monovision    HYSTERECTOMY  1975    Cancer cells    MASTECTOMY Right 2015    TOTAL ABDOMINAL HYSTERECTOMY W/ BILATERAL SALPINGOOPHORECTOMY  1975       Family History   Problem Relation Age of Onset    Cancer Mother     Breast cancer Mother     Diabetes Father     Stroke Father     Coronary artery disease Father        Social History     Social History    Marital status:      Spouse name: N/A    Number of children: 3    Years of education: N/A     Social History Main Topics    Smoking status: Never Smoker    Smokeless tobacco: Never Used    Alcohol use No    Drug use: No    Sexual activity: No     Other Topics Concern    None     Social History Narrative    None       Current Outpatient Prescriptions    Medication Sig Dispense Refill    aspirin (ECOTRIN) 81 MG EC tablet Take 81 mg by mouth Daily.      CALCIUM CARBONATE/VITAMIN D3 (VITAMIN D-3 ORAL) Take by mouth.      cholecalciferol, vitamin D3, (VITAMIN D3) 5,000 unit Tab Take 5,000 Units by mouth once daily.      escitalopram oxalate (LEXAPRO) 10 MG tablet Take 1 tablet (10 mg total) by mouth once daily. 90 tablet 3    metoprolol succinate (TOPROL-XL) 25 MG 24 hr tablet Take 0.5 tablets (12.5 mg total) by mouth once daily. 45 tablet 3    ranitidine (ZANTAC) 150 MG tablet Take 1 tablet (150 mg total) by mouth 2 (two) times daily. 180 tablet 3    SYNTHROID 75 mcg tablet TAKE 1 TABLET (75 MCG TOTAL) BY MOUTH ONCE DAILY. 90 tablet 3    varicella-zoster gE-AS01B, PF, (SHINGRIX, PF,) 50 mcg/0.5 mL injection Inject 0.5 mLs into the muscle. 0.5 mL 1     No current facility-administered medications for this visit.        Review of patient's allergies indicates:  No Known Allergies      Review of Systems   Constitution: Negative for chills.   Cardiovascular: Negative for claudication and leg swelling.   Skin: Positive for color change and nail changes.   Musculoskeletal: Negative for joint pain and joint swelling.   Neurological: Negative for numbness and paresthesias.   Psychiatric/Behavioral: Negative for altered mental status.           Objective:      Physical Exam   Constitutional: She is oriented to person, place, and time. She appears well-developed and well-nourished. No distress.   Cardiovascular:   Pulses:       Dorsalis pedis pulses are 2+ on the right side, and 2+ on the left side.        Posterior tibial pulses are 1+ on the right side, and 1+ on the left side.   CFT 3 seconds bilateral.  Pedal hair growth decreased bilateral.   Mild varicosities noted bilateral.  Mild nonpitting edema noted to bilateral lower extremity.  Toes are cool to touch bilateral.     Musculoskeletal: She exhibits edema. She exhibits no tenderness.   Muscle strength 5/5 in  all muscle groups bilateral.  No tenderness nor crepitation with ROM of foot/ankle joints bilateral.  No pain with palpation of bilateral foot and ankle.  Bilateral hallux abducto valgus.  Bilateral semi-rigid contracture of toes 2-5.       Neurological: She is alert and oriented to person, place, and time. She has normal strength. A sensory deficit is present.   Protective sensation per [place] monofilament is decreased bilateral.  Vibratory sensation is absent on the Rt. And decreased on the Lt.  Light touch is intact bilateral.   Skin: Skin is warm, dry and intact. Capillary refill takes less than 2 seconds. No abrasion, no bruising, no burn, no ecchymosis, no laceration, no lesion, no petechiae and no rash noted. She is not diaphoretic. No erythema. No pallor.   Pedal skin appears thin bilateral.  Toenails x 10 appear thickened by 2 mm's, elongated by 2 mm's and discolored with subungual debris. Examination of the skin reveals no evidence of significant maceration, rashes, open lesions, suspicious appearing nevi or other concerning lesions.              Assessment:       Encounter Diagnoses   Name Primary?    Peripheral vascular disease Yes    Onychomycosis due to dermatophyte     Idiopathic peripheral neuropathy     Hammer toes of both feet          Plan:       Silke was seen today for peripheral vascular disease and nail care.    Diagnoses and all orders for this visit:    Peripheral vascular disease    Onychomycosis due to dermatophyte    Idiopathic peripheral neuropathy    Hammer toes of both feet      I counseled the patient on her conditions, their implications and medical management.    Patient advised to continue wearing shoe gear that accommodates for digital deformities.    With patient's permission, nails were aggressively reduced and debrided x 10 to their soft tissue attachment mechanically and with electric , removing all offending nail and debris. Patient relates relief  following the procedure. She will continue to monitor the areas daily, inspect her feet, wear protective shoe gear when ambulatory, moisturizer to maintain skin integrity and follow in this office in approximately 2-3 months, sooner p.r.n.    Follow-up in about 3 months (around 11/2/2018).    Narayan Jonas DPM

## 2018-08-24 ENCOUNTER — TELEPHONE (OUTPATIENT)
Dept: FAMILY MEDICINE | Facility: CLINIC | Age: 83
End: 2018-08-24

## 2018-08-24 NOTE — TELEPHONE ENCOUNTER
Spoke to patient. Patient was checking to see if we have received her synthroid from the [facility]. Informed patient I was not sure but will check with other nurses. She says she always gets it sent directly to us.

## 2018-08-24 NOTE — TELEPHONE ENCOUNTER
----- Message from Ca Burns sent at 8/24/2018 11:33 AM CDT -----  Contact: self  Returning Tracy's call regarding medication. Please call back at [phone] ([phone])

## 2018-08-24 NOTE — TELEPHONE ENCOUNTER
----- Message from Gwen Zuleta sent at 8/24/2018  9:09 AM CDT -----  Type: Needs Medical Advice    Who Called:  Patient  Best Call Back Number: [phone]  Additional Information: Patient requesting to speak with nurse concerning Synthroid  Medication/asking if office received from "[employer]/please call back to advise.

## 2018-08-29 NOTE — TELEPHONE ENCOUNTER
----- Message from Jeannette Morales sent at 8/29/2018  9:42 AM CDT -----  Contact: self  Patient is calling regarding her Synthroid medication. She needs to know if it has been received in the office. Please call patient at [phone]. Thanks!

## 2018-08-30 NOTE — TELEPHONE ENCOUNTER
----- Message from Lalo Ayers sent at 8/30/2018  9:08 AM CDT -----  Contact: patient  Type: Needs Medical Advice    Who Called:  patient  Symptoms (please be specific):    How long has patient had these symptoms:    Pharmacy name and phone #:    Best Call Back Number: [phone]  Additional Information: requesting a call back regarding SYNTHROID 75 mcg tablet medication

## 2018-08-31 NOTE — TELEPHONE ENCOUNTER
Spoke to Aisha Kemp in receiving. States she has not received any medications but has several shipments coming in today (8/31). Contacted patient to inform of incoming shipments, patient confirmed that medication should be received today.

## 2018-09-05 ENCOUNTER — TELEPHONE (OUTPATIENT)
Dept: FAMILY MEDICINE | Facility: CLINIC | Age: 83
End: 2018-09-05

## 2018-09-05 NOTE — TELEPHONE ENCOUNTER
----- Message from Pili Acuña sent at 9/5/2018 11:43 AM CDT -----  Contact: Daughter Janneth   Daughter states patient has a lot of congestion and coughing and needs advise on what to do     Please call back [phone]

## 2018-09-05 NOTE — TELEPHONE ENCOUNTER
Sent to [facility] in error.      I spoke to Ms. Lagunas and notified her I would forward her message to Dr. Szymanski's nurse and someone from his office will contact her. Ms. Lagunas voiced understanding.

## 2018-09-06 NOTE — TELEPHONE ENCOUNTER
Spoke with patient .States she is feeling better. She is taking Mucinex and Tylenol. She states no fever. She will call back if symptoms don't go away in a few days or if she develops fever or any other symptom

## 2018-09-10 ENCOUNTER — HOSPITAL ENCOUNTER (OUTPATIENT)
Dept: RADIOLOGY | Facility: HOSPITAL | Age: 83
Discharge: HOME | End: 2018-09-10
Attending: NURSE PRACTITIONER
Payer: MEDICARE

## 2018-09-10 ENCOUNTER — OFFICE VISIT (OUTPATIENT)
Dept: PRIMARY CARE CLINIC | Facility: CLINIC | Age: 83
End: 2018-09-10
Payer: MEDICARE

## 2018-09-10 VITALS
HEIGHT: 64 IN | SYSTOLIC BLOOD PRESSURE: 172 MMHG | HEART RATE: 68 BPM | BODY MASS INDEX: 21.32 KG/M2 | WEIGHT: 124.88 LBS | OXYGEN SATURATION: 98 % | DIASTOLIC BLOOD PRESSURE: 82 MMHG | TEMPERATURE: 98 F

## 2018-09-10 DIAGNOSIS — R05.9 COUGH: ICD-10-CM

## 2018-09-10 DIAGNOSIS — R05.9 COUGH: Primary | ICD-10-CM

## 2018-09-10 DIAGNOSIS — R06.2 WHEEZING: ICD-10-CM

## 2018-09-10 PROCEDURE — 99214 OFFICE O/P EST MOD 30 MIN: CPT | Mod: S$PBB,,, | Performed by: NURSE PRACTITIONER

## 2018-09-10 PROCEDURE — 99214 OFFICE O/P EST MOD 30 MIN: CPT | Mod: PBBFAC,25,PO | Performed by: NURSE PRACTITIONER

## 2018-09-10 PROCEDURE — 99999 PR PBB SHADOW E&M-EST. PATIENT-LVL IV: CPT | Mod: PBBFAC,,, | Performed by: NURSE PRACTITIONER

## 2018-09-10 PROCEDURE — 71046 X-RAY EXAM CHEST 2 VIEWS: CPT | Mod: TC,FY,PO

## 2018-09-10 PROCEDURE — 71046 X-RAY EXAM CHEST 2 VIEWS: CPT | Mod: 26,,, | Performed by: RADIOLOGY

## 2018-09-10 PROCEDURE — 1101F PT FALLS ASSESS-DOCD LE1/YR: CPT | Mod: CPTII,,, | Performed by: NURSE PRACTITIONER

## 2018-09-10 NOTE — MEDICAL/APP STUDENT
Silke Dodd is a 89 y.o. female patient of Ronnie Szymanski MD who presents to the clinic today for   Chief Complaint   Patient presents with    Cough    URI   .    HPI    Pt, who is not known to me, reports a new problem to me: Pt began experiencing cold symptoms 5 days ago.  Has tried multiple over the counter medications. Mucus/congestion in head.  Occasional cough with brown phlegm.   Trying to cough mucus out of throat.  Pt does not normally sleep well, but c/o increased difficulty sleeping since cold began.  Pt says that she is rarely ever sick.    These symptoms began Thursday of last week  ago and status is not improved.    Symptoms are acute.    Pt denies the following symptoms:  Fever, chills, sore throat    Aggravating factors include none .    Relieving factors include steam from shower .    OTC Medications tried are Mucinex, Nasocort, Claritin, Astelin    Prescription medications taken for symptoms are none.    Pertinent medical history:  Hypertension (f/u with Dr. Cordova in 1 week). No hx of lung prioblems.    Smoking status:  Non-smoker    ROS    Constitutional:   No fever,  Mild fatigue, slight decrease in appetite.    Head:   Mild headache over frontal sinuses.  Ears:   No pain.  Eyes:  Watery eyes  Nose:   Mild sinus pain, + congestion, + runny nose, + post nasal drip.  Throat:  No ST pain.  Intermittent coughing.  No exudate, no difficulty swallowing but c/o feeling of mucus in her throat.    Heart:  No palpitations, chest pain.    Lungs:  No difficulty breathing, mild coughing, mild sputum production, mild wheezing.              Symptoms may be community acquired (from health club)    GI/:  Mild constipation (hard stool).  Denies urinary problems.    MS:  No new bone, joint or muscle problems.    Skin:  No rashes, itching.      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    Panic disorder        PAST SURGICAL HISTORY:  Past Surgical History:   Procedure Laterality Date    BIOPSY-SENTINEL NODE-Sulphor Coloid Right 1/5/2016    [facility]    CATARACT EXTRACTION Bilateral     corrected for monovision    HYSTERECTOMY  1975    Cancer cells    MASTECTOMY Right 2015    MASTECTOMY-SIMPLE-Frozen Section Right 1/5/2016    [facility]    TOTAL ABDOMINAL HYSTERECTOMY W/ BILATERAL SALPINGOOPHORECTOMY  1975       SOCIAL HISTORY:  Social History     Socioeconomic History    Marital status:      Spouse name: Not on file    Number of children: 3    Years of education: Not on file    Highest education level: Not on file   Social Needs    Financial resource strain: Not on file    Food insecurity - worry: Not on file    Food insecurity - inability: Not on file    Transportation needs - medical: Not on file    Transportation needs - non-medical: Not on file   Occupational History    Not on file   Tobacco Use    Smoking status: Never Smoker    Smokeless tobacco: Never Used   Substance and Sexual Activity    Alcohol use: No    Drug use: No    Sexual activity: No     Birth control/protection: Surgical   Other Topics Concern    Patient feels they ought to cut down on drinking/drug use Not Asked    Patient annoyed by others criticizing their drinking/drug use Not Asked    Patient has felt bad or guilty about drinking/drug use Not Asked    Patient has had a drink/used drugs as an eye opener in the AM Not Asked   Social History Narrative    Not on file       FAMILY HISTORY:  Family History   Problem Relation Age of Onset    Cancer Mother     Breast cancer Mother     Diabetes Father     Stroke Father     Coronary artery disease Father        ALLERGIES AND MEDICATIONS: updated and reviewed.  Review of  patient's allergies indicates:  No Known Allergies  Current Outpatient Medications   Medication Sig Dispense Refill    aspirin (ECOTRIN) 81 MG EC tablet Take 81 mg by mouth Daily.      cholecalciferol, vitamin D3, (VITAMIN D3) 5,000 unit Tab Take 5,000 Units by mouth once daily.      escitalopram oxalate (LEXAPRO) 10 MG tablet Take 1 tablet (10 mg total) by mouth once daily. 90 tablet 3    metoprolol succinate (TOPROL-XL) 25 MG 24 hr tablet Take 0.5 tablets (12.5 mg total) by mouth once daily. 45 tablet 3    ranitidine (ZANTAC) 150 MG tablet Take 1 tablet (150 mg total) by mouth 2 (two) times daily. 180 tablet 3    SYNTHROID 75 mcg tablet TAKE 1 TABLET (75 MCG TOTAL) BY MOUTH ONCE DAILY. 90 tablet 3     No current facility-administered medications for this visit.              PHYSICAL EXAM    Alert, coop 89 y.o. female patient in mild distress.    Vitals:    09/10/18 1707   BP: (!) 172/82   Pulse: 68   Temp: 97.5 °F (36.4 °C)     VS reviewed.  BP elevated.  CC, nursing note, medications & PMH all reviewed today.    Head:  Normocephalic, atraumatic.    EENT:  EACs visible.  TMs visible and intact, no erythema, no effusions (suppurative; nonsupurative), no TM perforation.                              Eye lids normal, no discharge present.       Sinus tenderness to palp--mild over frontal sinuses.                Nares-- no edema, no d/c present.    Pharynx mildly injected.                Tonsils absent, no exudate present.    anterior cervical lymph nodes palpable.    no submental, submandibular or supraclavicular lymph nodes palp.             Resp:  Respirations regular   Lungs + expiratory wheezing bilat.  No crackles.      Heart:  RRR, no MRG.    MS:  Ambulates without assistance.    NEURO:  Alert and oriented x 4.  Responds appropriately during interaction.  Appears distressed.    Skin:  Warm, dry, color good.

## 2018-09-10 NOTE — PATIENT INSTRUCTIONS
Increase water.  Use the mucinex.  Sleep with head elevated.  Vaporizer.  Get the Chest Xray.   We'll call you tomorrow with the results.  Sleep in the recliner tonight.    Evenings and weekends [facility] is available if symptoms worsen or fail to improve.  When you call the number, a registered nurse answers and takes your information.  The nurse can look at your medical record and make recommendations or call the on call physician, if warranted.      [facility] Address: [address] [place] [address] [address], [place] [address] Phone: [phone]    [facility] Address:  Address: [address] [place] [address] [place] [address] Phone: [phone].

## 2018-09-10 NOTE — Clinical Note
Ronnie Szymanski MD,  I saw your patient today in the [facility].  If you have any questions, please do not hesitate to contact me.  Thank you!  JOHN Gonsales

## 2018-09-11 ENCOUNTER — TELEPHONE (OUTPATIENT)
Dept: PRIMARY CARE CLINIC | Facility: CLINIC | Age: 83
End: 2018-09-11

## 2018-09-11 ENCOUNTER — TELEPHONE (OUTPATIENT)
Dept: FAMILY MEDICINE | Facility: CLINIC | Age: 83
End: 2018-09-11

## 2018-09-11 NOTE — TELEPHONE ENCOUNTER
----- Message from Nicole Gleason NP sent at 9/10/2018  7:26 PM CDT -----  Please call and let her know I won't be in until 1 so she won't worry.  Ask how she's feeling Tuesday morning.  Thank you!

## 2018-09-11 NOTE — TELEPHONE ENCOUNTER
----- Message from Vicki Vásquez sent at 9/11/2018  2:23 PM CDT -----  Contact: pt            Name of Who is Calling: CHERELLE CANTU [[id number]]    What is the request in detail: pt has question in regards to cough.. Please advise      Can the clinic reply by [facility]:yes    What Number to Call Back if not in [facility]: [phone]

## 2018-09-11 NOTE — PROGRESS NOTES
Silke Dodd is a 89 y.o. female patient of Ronnie Szymanski MD who presents to the clinic today for       Chief Complaint   Patient presents with    Cough    URI   .     HPI     Pt, who is not known to me, reports a new problem to me: Pt began experiencing cold symptoms 5 days ago.  Has tried multiple over the counter medications. Mucus/congestion in head.  Occasional cough with brown phlegm.   Trying to cough mucus out of throat.  Pt does not normally sleep well, but c/o increased difficulty sleeping since cold began.  Pt says that she is rarely ever sick and is not accustomed to feeling ill.  She doesn't drink much water.     These symptoms began Thursday of last week  ago and status is not improved.     Symptoms are acute.     Pt denies the following symptoms:  Fever, chills, sore throat     Aggravating factors include none .     Relieving factors include steam from shower, but she doesn't like to get in the shower..     OTC Medications tried are Mucinex, Nasocort, Claritin, Astelin     Prescription medications taken for symptoms are none.     Pertinent medical history:  Hypertension (f/u with Dr. Cordova in 1 week). No hx of lung prioblems.     Smoking status:  Non-smoker     ROS     Constitutional:   No fever,  Mild fatigue, slight decrease in appetite.     Head:   Mild headache over frontal sinuses.  Ears:   No pain.  Eyes:  Watery eyes  Nose:   Mild sinus pain, + congestion, + runny nose, + post nasal drip.  Throat:  No ST pain.  Intermittent coughing.  No exudate, no difficulty swallowing but c/o feeling of mucus in her throat.     Heart:  No palpitations, chest pain.     Lungs:  No difficulty breathing, mild coughing, some occ sputum production, mild wheezing.              Symptoms may be community acquired (from health club)     GI/:  Mild constipation (hard stool).  Denies urinary problems.     MS:  No new bone, joint or muscle problems.     Skin:  No rashes, itching.        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    Panic disorder           PAST SURGICAL HISTORY:        Past Surgical History:   Procedure Laterality Date    BIOPSY-SENTINEL NODE-Sulphor Coloid Right 1/5/2016     Performed by [facility] at [facility] OR    CATARACT EXTRACTION Bilateral       corrected for monovision    HYSTERECTOMY   1975     Cancer cells    MASTECTOMY Right 2015    MASTECTOMY-SIMPLE-Frozen Section Right 1/5/2016     Performed by [facility] at [facility]    TOTAL ABDOMINAL HYSTERECTOMY W/ BILATERAL SALPINGOOPHORECTOMY   1975         SOCIAL HISTORY:  Social History               Socioeconomic History    Marital status:        Spouse name: Not on file    Number of children: 3    Years of education: Not on file    Highest education level: Not on file   Social Needs    Financial resource strain: Not on file    Food insecurity - worry: Not on file    Food insecurity - inability: Not on file    Transportation needs - medical: Not on file    Transportation needs - non-medical: Not on file   Occupational History    Not on file   Tobacco Use    Smoking status: Never Smoker    Smokeless tobacco: Never Used   Substance and Sexual Activity    Alcohol use: No    Drug use: No    Sexual activity: No       Birth control/protection: Surgical   Other Topics Concern    Patient feels they ought to cut down on drinking/drug use Not Asked    Patient annoyed by others criticizing their drinking/drug use Not Asked    Patient has felt bad or guilty about drinking/drug use Not Asked    Patient has had a drink/used drugs as an eye opener in the AM Not Asked   Social History Narrative    Not on file            FAMILY HISTORY:        Family History   Problem Relation Age of  Onset    Cancer Mother      Breast cancer Mother      Diabetes Father      Stroke Father      Coronary artery disease Father           ALLERGIES AND MEDICATIONS: updated and reviewed.  Review of patient's allergies indicates:  No Known Allergies  Current Medications          Current Outpatient Medications   Medication Sig Dispense Refill    aspirin (ECOTRIN) 81 MG EC tablet Take 81 mg by mouth Daily.        cholecalciferol, vitamin D3, (VITAMIN D3) 5,000 unit Tab Take 5,000 Units by mouth once daily.        escitalopram oxalate (LEXAPRO) 10 MG tablet Take 1 tablet (10 mg total) by mouth once daily. 90 tablet 3    metoprolol succinate (TOPROL-XL) 25 MG 24 hr tablet Take 0.5 tablets (12.5 mg total) by mouth once daily. 45 tablet 3    ranitidine (ZANTAC) 150 MG tablet Take 1 tablet (150 mg total) by mouth 2 (two) times daily. 180 tablet 3    SYNTHROID 75 mcg tablet TAKE 1 TABLET (75 MCG TOTAL) BY MOUTH ONCE DAILY. 90 tablet 3      No current facility-administered medications for this visit.             PHYSICAL EXAM     Alert, coop 89 y.o. female patient in mild distress.         Vitals:     09/10/18 1707   BP: (!) 172/82   Pulse: 68   Temp: 97.5 °F (36.4 °C)      VS reviewed.  BP elevated.  CC, nursing note, medications & PMH all reviewed today.     Head:  Normocephalic, atraumatic.     EENT:  EACs visible.  TMs visible and intact, no erythema, no effusions (suppurative; nonsupurative), no TM perforation.                              Eye lids normal, no discharge present.                 Sinus tenderness to palp--mild over frontal sinuses.                           Nares-- no edema, no d/c present.               Pharynx mildly injected.                Tonsils absent, no exudate present.               Bilat anterior cervical lymph nodes palpable.               No submental, _ left submandibular, no supraclavicular lymph nodes palp.             Resp:  Respirations regular              Lungs + expiratory  wheezing bilat and with coughing.  No crackles.       Heart:  RRR.     MS:  Ambulates without assistance.     NEURO:  Alert and oriented x 4.  Responds appropriately during interaction.  Appears distressed.     Skin:  Warm, dry, color good.    Cough  -     X-Ray Chest PA And Lateral; Future; Expected date: 09/10/2018    Wheezing  -     X-Ray Chest PA And Lateral; Future; Expected date: 09/10/2018      Pt today presents with report of feeling ill for the past 5 days with sinus congestion, drip, feels like there's congestion in the throat but can't cough it up.  Feeling very uncomfortable because she's not used to feeling ill.  She never gets colds, she states.  No h/o smoking or lung disease.  On exam she has some nasal congestion and sinus tenderness.  She has some expiratory wheezing in the ULs ant and post and they are also audible with coughing.    This is a new problem to me and the following work up is planned.    Lab & Radiological Tests Ordered: CXR.  The results are pending.  The xray was read by radiologist.      Pt advised to perform comfort measures recommended on patient instruction sheet .    Recommendations will be based on the CXR  Explained exam findings, diagnosis and treatment plan to patient.  Questions answered and patient states understanding.

## 2018-09-11 NOTE — TELEPHONE ENCOUNTER
Spoke to pt daughter and she is trying to obtain results from xray. Advised that mult messages have already been sent to provider and assured that we will be in contact with her as soon as we have the results from the provider.

## 2018-09-11 NOTE — TELEPHONE ENCOUNTER
----- Message from Mildred Ambriz sent at 9/11/2018 10:50 AM CDT -----  Type:  Test Results    Who Called:  Patient  Name of Test (Lab/Mammo/Etc):  Xray  Date of Test:  9/10/18  Ordering Provider:  Earnestine  Where the test was performed:  [facility]  Best Call Back Number:  [phone]  Additional Information:  Is anxious to know what the xray showed and wouild like to talk to Nicole herself.

## 2018-09-11 NOTE — TELEPHONE ENCOUNTER
----- Message from Rudy Luna sent at 9/11/2018  8:59 AM CDT -----  Type: Needs Medical Advice    Who Called:  Daughter/Janneth    Best Call Back Number: [phone]  Additional Information: Daughter calling.  States that she wants to know her mother's test results

## 2018-09-17 ENCOUNTER — OFFICE VISIT (OUTPATIENT)
Dept: FAMILY MEDICINE | Facility: CLINIC | Age: 83
End: 2018-09-17
Payer: MEDICARE

## 2018-09-17 VITALS
HEART RATE: 77 BPM | RESPIRATION RATE: 16 BRPM | TEMPERATURE: 98 F | WEIGHT: 124.13 LBS | SYSTOLIC BLOOD PRESSURE: 126 MMHG | BODY MASS INDEX: 21.19 KG/M2 | OXYGEN SATURATION: 96 % | DIASTOLIC BLOOD PRESSURE: 74 MMHG | HEIGHT: 64 IN

## 2018-09-17 DIAGNOSIS — I10 ESSENTIAL HYPERTENSION: ICD-10-CM

## 2018-09-17 DIAGNOSIS — J22 LOWER RESPIRATORY INFECTION: Primary | ICD-10-CM

## 2018-09-17 PROCEDURE — 99214 OFFICE O/P EST MOD 30 MIN: CPT | Mod: S$PBB,,, | Performed by: NURSE PRACTITIONER

## 2018-09-17 PROCEDURE — 1101F PT FALLS ASSESS-DOCD LE1/YR: CPT | Mod: CPTII,,, | Performed by: NURSE PRACTITIONER

## 2018-09-17 PROCEDURE — 99213 OFFICE O/P EST LOW 20 MIN: CPT | Mod: PBBFAC,PO | Performed by: NURSE PRACTITIONER

## 2018-09-17 PROCEDURE — 99999 PR PBB SHADOW E&M-EST. PATIENT-LVL III: CPT | Mod: PBBFAC,,, | Performed by: NURSE PRACTITIONER

## 2018-09-17 RX ORDER — AZITHROMYCIN 250 MG/1
TABLET, FILM COATED ORAL
Qty: 6 TABLET | Refills: 0 | Status: SHIPPED | OUTPATIENT
Start: 2018-09-17 | End: 2018-09-17 | Stop reason: SDUPTHER

## 2018-09-17 RX ORDER — AZITHROMYCIN 250 MG/1
TABLET, FILM COATED ORAL
Qty: 6 TABLET | Refills: 0 | Status: SHIPPED | OUTPATIENT
Start: 2018-09-17 | End: 2018-09-22

## 2018-09-17 RX ORDER — ACETAMINOPHEN 325 MG/1
325 TABLET ORAL EVERY 6 HOURS PRN
COMMUNITY

## 2018-09-17 RX ORDER — GUAIFENESIN 600 MG/1
1200 TABLET, EXTENDED RELEASE ORAL 2 TIMES DAILY
COMMUNITY

## 2018-09-17 RX ORDER — TRIAMCINOLONE ACETONIDE 55 UG/1
2 SPRAY, METERED NASAL DAILY
COMMUNITY
End: 2019-07-18

## 2018-09-17 RX ORDER — AZELASTINE 1 MG/ML
1 SPRAY, METERED NASAL 2 TIMES DAILY
COMMUNITY
End: 2019-07-18

## 2018-09-17 NOTE — PROGRESS NOTES
Subjective:       Patient ID: Silke Dodd is a 89 y.o. female.    Chief Complaint: Cough (x 7days) and Nasal Congestion    Ms. Dodd is a new patient to me. She presents today for persistent cough    Cough   This is a new problem. The current episode started 1 to 4 weeks ago. The problem has been unchanged. The cough is productive of sputum. Pertinent negatives include no eye redness, fever or shortness of breath. Treatments tried: mucinex. The treatment provided no relief.     Saw NP Victorino for this problem on 9/10--xray clear, advised on supportive care  Vitals:    09/17/18 1105   BP: 126/74   Pulse: 77   Resp: 16   Temp: 98.1 °F (36.7 °C)     Review of Systems   Constitutional: Positive for fatigue. Negative for diaphoresis and fever.   HENT: Negative for facial swelling and trouble swallowing.    Eyes: Negative for discharge and redness.   Respiratory: Positive for cough. Negative for shortness of breath.    Cardiovascular: Negative for palpitations.   Gastrointestinal: Negative for diarrhea.   Genitourinary: Negative for difficulty urinating.   Musculoskeletal: Negative for gait problem.   Skin: Negative for wound.   Neurological: Negative for facial asymmetry and speech difficulty.   Psychiatric/Behavioral: Negative for confusion. The patient is not nervous/anxious.        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 She does not have a sickly appearance. No distress.   HENT:   Head: Normocephalic.   Right Ear: Hearing normal.   Left Ear: Hearing normal.   Nose: Nose normal.   Eyes: Conjunctivae and lids are normal.   Neck: No JVD present. No tracheal deviation present.   Cardiovascular: Normal rate and  normal heart sounds.   Pulmonary/Chest: Effort normal. She has rhonchi.   Abdominal: Normal appearance. She exhibits no distension.   Musculoskeletal: She exhibits no deformity.   Neurological: She is alert and oriented to person, place, and time.   Skin: She is not diaphoretic. No pallor.   Psychiatric: She has a normal mood and affect. Her speech is normal and behavior is normal. Judgment and thought content normal. Cognition and memory are normal.   Nursing note and vitals reviewed.      Assessment:       1. Lower respiratory infection    2. Essential hypertension        Plan:       Lower respiratory infection  -     azithromycin (Z-JEFF) 250 MG tablet; Take 2 tablets by mouth on day 1; Take 1 tablet by mouth on days 2-5  Dispense: 6 tablet; Refill: 0  - Educated on return precautions    Essential hypertension   Controlled        Follow-up for further evaluation if s/s worsen, fail to improve, or new symptoms arise.

## 2018-11-12 ENCOUNTER — OFFICE VISIT (OUTPATIENT)
Dept: PODIATRY | Facility: CLINIC | Age: 83
End: 2018-11-12
Payer: MEDICARE

## 2018-11-12 ENCOUNTER — HOSPITAL ENCOUNTER (OUTPATIENT)
Dept: RADIOLOGY | Facility: HOSPITAL | Age: 83
Discharge: HOME | End: 2018-11-12
Attending: SURGERY
Payer: MEDICARE

## 2018-11-12 VITALS
DIASTOLIC BLOOD PRESSURE: 73 MMHG | SYSTOLIC BLOOD PRESSURE: 170 MMHG | HEIGHT: 64 IN | HEART RATE: 63 BPM | WEIGHT: 124.13 LBS | BODY MASS INDEX: 21.19 KG/M2

## 2018-11-12 DIAGNOSIS — M20.41 HAMMER TOES OF BOTH FEET: ICD-10-CM

## 2018-11-12 DIAGNOSIS — B35.1 ONYCHOMYCOSIS DUE TO DERMATOPHYTE: ICD-10-CM

## 2018-11-12 DIAGNOSIS — M20.42 HAMMER TOES OF BOTH FEET: ICD-10-CM

## 2018-11-12 DIAGNOSIS — Z12.39 ENCOUNTER FOR SPECIAL SCREENING EXAMINATION FOR NEOPLASM OF BREAST: ICD-10-CM

## 2018-11-12 DIAGNOSIS — I73.9 PERIPHERAL VASCULAR DISEASE: ICD-10-CM

## 2018-11-12 DIAGNOSIS — G60.9 IDIOPATHIC PERIPHERAL NEUROPATHY: Primary | ICD-10-CM

## 2018-11-12 PROCEDURE — 77067 SCR MAMMO BI INCL CAD: CPT | Mod: 26,HCWC,, | Performed by: RADIOLOGY

## 2018-11-12 PROCEDURE — 99499 UNLISTED E&M SERVICE: CPT | Mod: HCWC,S$GLB,, | Performed by: PODIATRIST

## 2018-11-12 PROCEDURE — 99999 PR PBB SHADOW E&M-EST. PATIENT-LVL III: CPT | Mod: PBBFAC,HCWC,, | Performed by: PODIATRIST

## 2018-11-12 PROCEDURE — 11721 DEBRIDE NAIL 6 OR MORE: CPT | Mod: Q9,HCWC,S$GLB, | Performed by: PODIATRIST

## 2018-11-12 PROCEDURE — 77063 BREAST TOMOSYNTHESIS BI: CPT | Mod: 26,HCWC,, | Performed by: RADIOLOGY

## 2018-11-12 PROCEDURE — 77063 BREAST TOMOSYNTHESIS BI: CPT | Mod: TC,HCWC,PO

## 2018-11-12 NOTE — PROGRESS NOTES
Subjective:      Patient ID: Silke Dodd is a 89 y.o. female.    Chief Complaint: Peripheral Vascular Disease (Stephon 6/20/18)    Silke is a 89 y.o. female who presents to the clinic for evaluation and treatment of high risk feet. 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 The patient's chief complaint is long, thick toenails.  Denies being painful with wearing shoe gear.  Has not attempted to self trim.  Relates regular application of vicks vaporub to toenails daily.  Denies any additional pedal complaints.       PCP: Ronnie Szymanski MD    Date Last Seen by PCP: 6/20/18    No results found for: HGBA1C      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    Ovarian cancer     Panic disorder        Past Surgical History:   Procedure Laterality Date    BIOPSY-SENTINEL NODE-Sulphor Coloid Right 1/5/2016    Performed by [facility] at [facility] OR    CATARACT EXTRACTION Bilateral     corrected for monovision    HYSTERECTOMY  1975    Cancer cells    MASTECTOMY Right 2015    MASTECTOMY-SIMPLE-Frozen Section Right 1/5/2016    Performed by [facility] at [facility] OR    TOTAL ABDOMINAL HYSTERECTOMY W/ BILATERAL SALPINGOOPHORECTOMY  1975       Family History   Problem Relation Age of Onset    Cancer Mother     Breast cancer Mother     Diabetes Father     Stroke Father     Coronary artery disease Father        Social History     Socioeconomic History    Marital status:      Spouse name: None    Number of children: 3    Years of education: None    Highest education level: None   Social Needs    Financial resource  strain: None    Food insecurity - worry: None    Food insecurity - inability: None    Transportation needs - medical: None    Transportation needs - non-medical: None   Occupational History    None   Tobacco Use    Smoking status: Never Smoker    Smokeless tobacco: Never Used   Substance and Sexual Activity    Alcohol use: No    Drug use: No    Sexual activity: No     Birth control/protection: Surgical   Other Topics Concern    Patient feels they ought to cut down on drinking/drug use Not Asked    Patient annoyed by others criticizing their drinking/drug use Not Asked    Patient has felt bad or guilty about drinking/drug use Not Asked    Patient has had a drink/used drugs as an eye opener in the AM Not Asked   Social History Narrative    None       Current Outpatient Medications   Medication Sig Dispense Refill    acetaminophen (TYLENOL) 325 MG tablet Take 325 mg by mouth every 6 (six) hours as needed for Pain.      aspirin (ECOTRIN) 81 MG EC tablet Take 81 mg by mouth Daily.      azelastine (ASTELIN) 137 mcg (0.1 %) nasal spray 1 spray by Nasal route 2 (two) times daily.      cholecalciferol, vitamin D3, (VITAMIN D3) 5,000 unit Tab Take 5,000 Units by mouth once daily.      escitalopram oxalate (LEXAPRO) 10 MG tablet Take 1 tablet (10 mg total) by mouth once daily. 90 tablet 3    FLUZONE HIGH-DOSE 2018-19, PF, 180 mcg/0.5 mL vaccine       guaiFENesin (MUCINEX) 600 mg 12 hr tablet Take 1,200 mg by mouth 2 (two) times daily.      metoprolol succinate (TOPROL-XL) 25 MG 24 hr tablet Take 0.5 tablets (12.5 mg total) by mouth once daily. 45 tablet 3    ranitidine (ZANTAC) 150 MG tablet Take 1 tablet (150 mg total) by mouth 2 (two) times daily. 180 tablet 3    SYNTHROID 75 mcg tablet TAKE 1 TABLET (75 MCG TOTAL) BY MOUTH ONCE DAILY. 90 tablet 3    triamcinolone (NASACORT) 55 mcg nasal inhaler 2 sprays by Nasal route once daily.       No current facility-administered medications for this visit.         Review of patient's allergies indicates:  No Known Allergies      Review of Systems   Constitution: Negative for chills.   Cardiovascular: Negative for claudication and leg swelling.   Skin: Positive for color change and nail changes.   Musculoskeletal: Negative for joint pain and joint swelling.   Neurological: Negative for numbness and paresthesias.   Psychiatric/Behavioral: Negative for altered mental status.           Objective:      Physical Exam   Constitutional: She is oriented to person, place, and time. She appears well-developed and well-nourished. No distress.   Cardiovascular:   Pulses:       Dorsalis pedis pulses are 1+ on the right side, and 1+ on the left side.        Posterior tibial pulses are 1+ on the right side, and 1+ on the left side.   CFT 3 seconds bilateral.  Pedal hair growth decreased bilateral.   Mild varicosities noted bilateral.  Mild nonpitting edema noted to bilateral lower extremity.  Toes are cool to touch bilateral.     Musculoskeletal: She exhibits edema. She exhibits no tenderness.   Muscle strength 5/5 in all muscle groups bilateral.  No tenderness nor crepitation with ROM of foot/ankle joints bilateral.  No pain with palpation of bilateral foot and ankle.  Bilateral hallux abducto valgus.  Bilateral semi-rigid contracture of toes 2-5.       Neurological: She is alert and oriented to person, place, and time. She has normal strength. A sensory deficit is present.   Protective sensation per [place] monofilament is decreased bilateral.  Vibratory sensation is absent bilateral.  Light touch is intact bilateral.   Skin: Skin is warm, dry and intact. Capillary refill takes less than 2 seconds. No abrasion, no bruising, no burn, no ecchymosis, no laceration, no lesion, no petechiae and no rash noted. She is not diaphoretic. No erythema. No pallor.   Pedal skin appears thin bilateral.  Toenails x 10 appear thickened by 2 mm's, elongated by 2 mm's and discolored with  subungual debris. Examination of the skin reveals no evidence of significant maceration, rashes, open lesions, suspicious appearing nevi or other concerning lesions.              Assessment:       Encounter Diagnoses   Name Primary?    Idiopathic peripheral neuropathy Yes    Peripheral vascular disease     Hammer toes of both feet     Onychomycosis due to dermatophyte          Plan:       Silke was seen today for peripheral vascular disease.    Diagnoses and all orders for this visit:    Idiopathic peripheral neuropathy    Peripheral vascular disease    Hammer toes of both feet    Onychomycosis due to dermatophyte      I counseled the patient on her conditions, their implications and medical management.    Patient advised to continue wearing shoe gear that accommodates for digital deformities.    With patient's permission, nails were aggressively reduced and debrided x 10 to their soft tissue attachment mechanically and with electric , removing all offending nail and debris. Patient relates relief following the procedure. She will continue to monitor the areas daily, inspect her feet, wear protective shoe gear when ambulatory, moisturizer to maintain skin integrity and follow in this office in approximately 2-3 months, sooner p.r.n.    Follow-up in about 3 months (around 2/12/2019).    Narayan Jonas DPM

## 2018-12-03 ENCOUNTER — PATIENT MESSAGE (OUTPATIENT)
Dept: FAMILY MEDICINE | Facility: CLINIC | Age: 83
End: 2018-12-03

## 2018-12-03 ENCOUNTER — TELEPHONE (OUTPATIENT)
Dept: FAMILY MEDICINE | Facility: CLINIC | Age: 83
End: 2018-12-03

## 2018-12-03 ENCOUNTER — LAB VISIT (OUTPATIENT)
Dept: LAB | Facility: HOSPITAL | Age: 83
End: 2018-12-03
Attending: SURGERY
Payer: MEDICARE

## 2018-12-03 DIAGNOSIS — Z85.3 PERSONAL HISTORY OF MALIGNANT NEOPLASM OF BREAST: Primary | ICD-10-CM

## 2018-12-03 LAB — CEA SERPL-MCNC: 1.9 NG/ML

## 2018-12-03 PROCEDURE — 86300 IMMUNOASSAY TUMOR CA 15-3: CPT | Mod: 91,HCWC

## 2018-12-03 PROCEDURE — 86300 IMMUNOASSAY TUMOR CA 15-3: CPT | Mod: HCWC

## 2018-12-03 PROCEDURE — 82378 CARCINOEMBRYONIC ANTIGEN: CPT | Mod: HCWC

## 2018-12-03 RX ORDER — LEVOTHYROXINE SODIUM 75 UG/1
75 TABLET ORAL DAILY
Qty: 90 TABLET | Refills: 3 | Status: SHIPPED | OUTPATIENT
Start: 2018-12-03 | End: 2019-02-06 | Stop reason: SDUPTHER

## 2018-12-03 RX ORDER — LEVOTHYROXINE SODIUM 75 UG/1
75 TABLET ORAL DAILY
Qty: 90 TABLET | Refills: 3 | Status: CANCELLED | OUTPATIENT
Start: 2018-12-03

## 2018-12-03 NOTE — TELEPHONE ENCOUNTER
----- Message from Donnell Jauregui sent at 12/3/2018 10:31 AM CST -----  Contact: Daughter, Janneth zuniga to speak with a nurse regarding message she sent on portal please call back at [phone]

## 2018-12-03 NOTE — TELEPHONE ENCOUNTER
----- Message from Cassia Reardon sent at 12/3/2018  9:34 AM CST -----  Contact: pt  Pt calling states calling about medication,synthroid that was sent to the office she is looking for. Will  this afternoon...[phone] (home)

## 2018-12-05 LAB
CANCER AG15-3 SERPL-ACNC: 12 U/ML (ref 0–31)
CANCER AG27-29 SERPL-ACNC: 16.6 U/ML

## 2019-01-30 ENCOUNTER — TELEPHONE (OUTPATIENT)
Dept: FAMILY MEDICINE | Facility: CLINIC | Age: 84
End: 2019-01-30

## 2019-01-30 NOTE — TELEPHONE ENCOUNTER
----- Message from Brooklyn Pelletier sent at 1/30/2019  1:04 PM CST -----  Contact: self  Patient need to speak to nurse regarding patient was asked by office to reschedule appt 02/04    Epic next available is 05/27      Patient requesting sooner appt to have appt switched to 02/12 that early morning or later afternoon after 10:40am due to patient has appt on 02/12 with another doctor and only transportation      Please call to advice [phone] (home)

## 2019-01-30 NOTE — TELEPHONE ENCOUNTER
----- Message from Brooklyn Pelletier sent at 1/30/2019  1:04 PM CST -----  Contact: self  Patient need to speak to nurse regarding patient was asked by office to reschedule appt 02/04    Epic next available is 05/27      Patient requesting sooner appt to have appt switched to 02/12 that early morning or later afternoon after 10:40am due to patient has appt on 02/12 with another doctor and only transportation      Please call to advice [phone] (home)

## 2019-01-30 NOTE — TELEPHONE ENCOUNTER
Please see message below. Please advise if pt can be seen on 2/12. Pt states she has other appts that day and does not have transportation.

## 2019-01-31 ENCOUNTER — TELEPHONE (OUTPATIENT)
Dept: FAMILY MEDICINE | Facility: CLINIC | Age: 84
End: 2019-01-31

## 2019-01-31 NOTE — TELEPHONE ENCOUNTER
----- Message from Cassia Reardon sent at 1/31/2019 10:15 AM CST -----  Contact: pt  Pt is wanting to speak to the nurse regarding she had called yesterday after getting a call that the Dr would not be in on 2/4 and she had wanted to move her appointment to 2/12 and no one has called her back as to say if it is okay,please. Would like a call back today at..[phone] (home)

## 2019-01-31 NOTE — TELEPHONE ENCOUNTER
----- Message from Kristian Canales sent at 1/31/2019  4:00 PM CST -----  Type:  Sooner Apoointment Request    Caller is requesting a sooner appointment.  Caller declined first available appointment listed below.  Caller will not accept being placed on the waitlist and is requesting a message be sent to doctor.    Name of Caller:  Patient  When is the first available appointment?  5.27  Best Call Back Number:  [phone] (home)   Additional Information:  Patient would like to come on 2.12 - since she was cancelled on for 2.4

## 2019-01-31 NOTE — TELEPHONE ENCOUNTER
Pt phoned in regards to her cancelled appt for 2/4/19. Pt requesting appt for 2/12/19 instructed pt no availability double booked her for 2/6/19 at 11:15 AM. Pt voiced understanding. CLC

## 2019-02-06 ENCOUNTER — TELEPHONE (OUTPATIENT)
Dept: FAMILY MEDICINE | Facility: CLINIC | Age: 84
End: 2019-02-06

## 2019-02-06 ENCOUNTER — OFFICE VISIT (OUTPATIENT)
Dept: FAMILY MEDICINE | Facility: CLINIC | Age: 84
End: 2019-02-06
Payer: MEDICARE

## 2019-02-06 VITALS
DIASTOLIC BLOOD PRESSURE: 70 MMHG | WEIGHT: 128.5 LBS | BODY MASS INDEX: 21.94 KG/M2 | HEART RATE: 66 BPM | SYSTOLIC BLOOD PRESSURE: 120 MMHG | HEIGHT: 64 IN

## 2019-02-06 DIAGNOSIS — I10 ESSENTIAL HYPERTENSION: ICD-10-CM

## 2019-02-06 DIAGNOSIS — I70.0 AORTIC ATHEROSCLEROSIS: ICD-10-CM

## 2019-02-06 DIAGNOSIS — E03.9 HYPOTHYROIDISM, UNSPECIFIED TYPE: Primary | ICD-10-CM

## 2019-02-06 PROCEDURE — 1101F PT FALLS ASSESS-DOCD LE1/YR: CPT | Mod: HCNC,CPTII,S$GLB, | Performed by: FAMILY MEDICINE

## 2019-02-06 PROCEDURE — 99214 OFFICE O/P EST MOD 30 MIN: CPT | Mod: HCNC,S$GLB,, | Performed by: FAMILY MEDICINE

## 2019-02-06 PROCEDURE — 99999 PR PBB SHADOW E&M-EST. PATIENT-LVL III: ICD-10-PCS | Mod: PBBFAC,HCNC,, | Performed by: FAMILY MEDICINE

## 2019-02-06 PROCEDURE — 1101F PR PT FALLS ASSESS DOC 0-1 FALLS W/OUT INJ PAST YR: ICD-10-PCS | Mod: HCNC,CPTII,S$GLB, | Performed by: FAMILY MEDICINE

## 2019-02-06 PROCEDURE — 99214 PR OFFICE/OUTPT VISIT, EST, LEVL IV, 30-39 MIN: ICD-10-PCS | Mod: HCNC,S$GLB,, | Performed by: FAMILY MEDICINE

## 2019-02-06 PROCEDURE — 99999 PR PBB SHADOW E&M-EST. PATIENT-LVL III: CPT | Mod: PBBFAC,HCNC,, | Performed by: FAMILY MEDICINE

## 2019-02-06 RX ORDER — LEVOTHYROXINE SODIUM 75 UG/1
75 TABLET ORAL DAILY
Qty: 90 TABLET | Refills: 3 | Status: SHIPPED | OUTPATIENT
Start: 2019-02-06 | End: 2020-02-28

## 2019-02-06 NOTE — TELEPHONE ENCOUNTER
----- Message from Celia Natarajan sent at 2/6/2019 12:43 PM CST -----  Contact: self and daughter at check out  Patient forgot to request a trial prescription for Generic Synthroid. She wants it called to [facility] on record.

## 2019-02-11 ENCOUNTER — TELEPHONE (OUTPATIENT)
Dept: FAMILY MEDICINE | Facility: CLINIC | Age: 84
End: 2019-02-11

## 2019-02-11 DIAGNOSIS — N39.41 URGENCY INCONTINENCE: Primary | ICD-10-CM

## 2019-02-11 NOTE — TELEPHONE ENCOUNTER
----- Message from Lalo Ayers sent at 2/11/2019  8:46 AM CST -----  Contact: patient  Type: Needs Medical Advice    Who Called:  patient  Symptoms (please be specific):    How long has patient had these symptoms:    Pharmacy name and phone #:    Best Call Back Number: [phone]  Additional Information: requesting urine test be ordered with labs in [facility],requesting a call back so she can schedule testing

## 2019-02-12 NOTE — TELEPHONE ENCOUNTER
Advised pt that UA has been ordered.  Offered to help pt scheduled, she stated that she will do a walk in at her earliest convenience.

## 2019-02-15 NOTE — PROGRESS NOTES
Subjective:       Patient ID: Silke Dodd is a 89 y.o. female    Chief Complaint: Hypertension (Here for 6 month f/u. Hm due tetanus,zoster)    Here today for interval evaluation  Doing well.  No new complaints.        Review of Systems   Constitutional: Negative for activity change and unexpected weight change.   HENT: Positive for hearing loss. Negative for rhinorrhea and trouble swallowing.    Eyes: Negative for discharge and visual disturbance.   Respiratory: Negative for chest tightness and wheezing.    Cardiovascular: Negative for chest pain and palpitations.   Gastrointestinal: Negative for blood in stool, constipation, diarrhea and vomiting.   Endocrine: Negative for polydipsia and polyuria.   Genitourinary: Negative for difficulty urinating, dysuria, hematuria and menstrual problem.   Musculoskeletal: Negative for arthralgias, joint swelling and neck pain.   Neurological: Negative for weakness and headaches.   Psychiatric/Behavioral: Negative for confusion and dysphoric mood.        Objective:   Physical Exam   Constitutional: She is oriented to person, place, and time. She appears well-developed and well-nourished.   Neurological: She is alert and oriented to person, place, and time.   Vitals reviewed.    Lab Results   Component Value Date    TSH 0.757 06/20/2018        Assessment:       1. Hypothyroidism, unspecified type  Comprehensive metabolic panel    Lipid panel        Plan:       Hypothyroidism, unspecified type  -     Comprehensive metabolic panel; Future; Expected date: 02/06/2019  -     Lipid panel; Future; Expected date: 02/06/2019  - Continue current therapy     Essential hypertension with Aortic atherosclerosis  - Continue current therapy  - Serial blood pressure monitoring  - Diet and exercise education.  - Follow-up in about 6 months (around 8/6/2019).

## 2019-02-26 ENCOUNTER — OFFICE VISIT (OUTPATIENT)
Dept: PRIMARY CARE CLINIC | Facility: CLINIC | Age: 84
End: 2019-02-26
Payer: MEDICARE

## 2019-02-26 ENCOUNTER — LAB VISIT (OUTPATIENT)
Dept: LAB | Facility: HOSPITAL | Age: 84
End: 2019-02-26
Attending: FAMILY MEDICINE
Payer: MEDICARE

## 2019-02-26 ENCOUNTER — OFFICE VISIT (OUTPATIENT)
Dept: PODIATRY | Facility: CLINIC | Age: 84
End: 2019-02-26
Payer: MEDICARE

## 2019-02-26 VITALS
TEMPERATURE: 98 F | WEIGHT: 128.5 LBS | HEART RATE: 93 BPM | OXYGEN SATURATION: 96 % | DIASTOLIC BLOOD PRESSURE: 72 MMHG | HEIGHT: 64 IN | BODY MASS INDEX: 21.94 KG/M2 | SYSTOLIC BLOOD PRESSURE: 124 MMHG

## 2019-02-26 VITALS — HEIGHT: 64 IN | WEIGHT: 128.5 LBS | BODY MASS INDEX: 21.94 KG/M2

## 2019-02-26 DIAGNOSIS — N39.41 URGENCY INCONTINENCE: ICD-10-CM

## 2019-02-26 DIAGNOSIS — M20.41 HAMMER TOES OF BOTH FEET: ICD-10-CM

## 2019-02-26 DIAGNOSIS — B35.1 ONYCHOMYCOSIS DUE TO DERMATOPHYTE: ICD-10-CM

## 2019-02-26 DIAGNOSIS — E03.9 HYPOTHYROIDISM, UNSPECIFIED TYPE: ICD-10-CM

## 2019-02-26 DIAGNOSIS — I73.9 PERIPHERAL VASCULAR DISEASE: ICD-10-CM

## 2019-02-26 DIAGNOSIS — G60.9 IDIOPATHIC PERIPHERAL NEUROPATHY: Primary | ICD-10-CM

## 2019-02-26 DIAGNOSIS — S60.450A FOREIGN BODY OF RIGHT INDEX FINGER: Primary | ICD-10-CM

## 2019-02-26 DIAGNOSIS — M20.42 HAMMER TOES OF BOTH FEET: ICD-10-CM

## 2019-02-26 LAB
ALBUMIN SERPL BCP-MCNC: 3.6 G/DL
ALP SERPL-CCNC: 77 U/L
ALT SERPL W/O P-5'-P-CCNC: 9 U/L
ANION GAP SERPL CALC-SCNC: 6 MMOL/L
AST SERPL-CCNC: 18 U/L
BACTERIA #/AREA URNS HPF: ABNORMAL /HPF
BILIRUB SERPL-MCNC: 0.9 MG/DL
BILIRUB UR QL STRIP: NEGATIVE
BUN SERPL-MCNC: 19 MG/DL
CALCIUM SERPL-MCNC: 9.6 MG/DL
CHLORIDE SERPL-SCNC: 103 MMOL/L
CHOLEST SERPL-MCNC: 254 MG/DL
CHOLEST/HDLC SERPL: 3 {RATIO}
CLARITY UR: CLEAR
CO2 SERPL-SCNC: 31 MMOL/L
COLOR UR: YELLOW
CREAT SERPL-MCNC: 0.8 MG/DL
EST. GFR  (AFRICAN AMERICAN): >60 ML/MIN/1.73 M^2
EST. GFR  (NON AFRICAN AMERICAN): >60 ML/MIN/1.73 M^2
GLUCOSE SERPL-MCNC: 87 MG/DL
GLUCOSE UR QL STRIP: NEGATIVE
HDLC SERPL-MCNC: 85 MG/DL
HDLC SERPL: 33.5 %
HGB UR QL STRIP: ABNORMAL
KETONES UR QL STRIP: NEGATIVE
LDLC SERPL CALC-MCNC: 153.2 MG/DL
LEUKOCYTE ESTERASE UR QL STRIP: ABNORMAL
MICROSCOPIC COMMENT: ABNORMAL
NITRITE UR QL STRIP: NEGATIVE
NONHDLC SERPL-MCNC: 169 MG/DL
PH UR STRIP: 6 [PH] (ref 5–8)
POTASSIUM SERPL-SCNC: 3.9 MMOL/L
PROT SERPL-MCNC: 6.8 G/DL
PROT UR QL STRIP: NEGATIVE
RBC #/AREA URNS HPF: 10 /HPF (ref 0–4)
SODIUM SERPL-SCNC: 140 MMOL/L
SP GR UR STRIP: 1.02 (ref 1–1.03)
TRIGL SERPL-MCNC: 79 MG/DL
URN SPEC COLLECT METH UR: ABNORMAL
WBC #/AREA URNS HPF: 10 /HPF (ref 0–5)

## 2019-02-26 PROCEDURE — 99214 OFFICE O/P EST MOD 30 MIN: CPT | Mod: HCNC,25,S$GLB, | Performed by: NURSE PRACTITIONER

## 2019-02-26 PROCEDURE — 1101F PT FALLS ASSESS-DOCD LE1/YR: CPT | Mod: HCNC,CPTII,S$GLB, | Performed by: NURSE PRACTITIONER

## 2019-02-26 PROCEDURE — 11721 DEBRIDE NAIL 6 OR MORE: CPT | Mod: Q9,HCNC,S$GLB, | Performed by: PODIATRIST

## 2019-02-26 PROCEDURE — 80061 LIPID PANEL: CPT | Mod: HCNC

## 2019-02-26 PROCEDURE — 10120 PR REMOVE FOREIGN BODY SIMPLE: ICD-10-PCS | Mod: HCNC,S$GLB,, | Performed by: NURSE PRACTITIONER

## 2019-02-26 PROCEDURE — 99999 PR PBB SHADOW E&M-EST. PATIENT-LVL II: ICD-10-PCS | Mod: PBBFAC,HCNC,, | Performed by: PODIATRIST

## 2019-02-26 PROCEDURE — 99999 PR PBB SHADOW E&M-EST. PATIENT-LVL V: CPT | Mod: PBBFAC,HCNC,, | Performed by: NURSE PRACTITIONER

## 2019-02-26 PROCEDURE — 99213 PR OFFICE/OUTPT VISIT, EST, LEVL III, 20-29 MIN: ICD-10-PCS | Mod: 25,HCNC,S$GLB, | Performed by: PODIATRIST

## 2019-02-26 PROCEDURE — 99999 PR PBB SHADOW E&M-EST. PATIENT-LVL II: CPT | Mod: PBBFAC,HCNC,, | Performed by: PODIATRIST

## 2019-02-26 PROCEDURE — 10120 INC&RMVL FB SUBQ TISS SMPL: CPT | Mod: HCNC,S$GLB,, | Performed by: NURSE PRACTITIONER

## 2019-02-26 PROCEDURE — 1101F PT FALLS ASSESS-DOCD LE1/YR: CPT | Mod: HCNC,CPTII,S$GLB, | Performed by: PODIATRIST

## 2019-02-26 PROCEDURE — 11721 PR DEBRIDEMENT OF NAILS, 6 OR MORE: ICD-10-PCS | Mod: Q9,HCNC,S$GLB, | Performed by: PODIATRIST

## 2019-02-26 PROCEDURE — 81000 URINALYSIS NONAUTO W/SCOPE: CPT | Mod: HCNC,PO

## 2019-02-26 PROCEDURE — 80053 COMPREHEN METABOLIC PANEL: CPT | Mod: HCNC

## 2019-02-26 PROCEDURE — 1101F PR PT FALLS ASSESS DOC 0-1 FALLS W/OUT INJ PAST YR: ICD-10-PCS | Mod: HCNC,CPTII,S$GLB, | Performed by: NURSE PRACTITIONER

## 2019-02-26 PROCEDURE — 36415 COLL VENOUS BLD VENIPUNCTURE: CPT | Mod: HCNC,PO

## 2019-02-26 PROCEDURE — 99213 OFFICE O/P EST LOW 20 MIN: CPT | Mod: 25,HCNC,S$GLB, | Performed by: PODIATRIST

## 2019-02-26 PROCEDURE — 99214 PR OFFICE/OUTPT VISIT, EST, LEVL IV, 30-39 MIN: ICD-10-PCS | Mod: HCNC,25,S$GLB, | Performed by: NURSE PRACTITIONER

## 2019-02-26 PROCEDURE — 1101F PR PT FALLS ASSESS DOC 0-1 FALLS W/OUT INJ PAST YR: ICD-10-PCS | Mod: HCNC,CPTII,S$GLB, | Performed by: PODIATRIST

## 2019-02-26 PROCEDURE — 99999 PR PBB SHADOW E&M-EST. PATIENT-LVL V: ICD-10-PCS | Mod: PBBFAC,HCNC,, | Performed by: NURSE PRACTITIONER

## 2019-02-26 RX ORDER — MUPIROCIN 20 MG/G
OINTMENT TOPICAL 2 TIMES DAILY
Qty: 15 G | Refills: 0 | Status: SHIPPED | OUTPATIENT
Start: 2019-02-26 | End: 2019-04-23

## 2019-02-26 RX ORDER — NITROFURANTOIN (MACROCRYSTALS) 100 MG/1
100 CAPSULE ORAL EVERY 12 HOURS
Qty: 20 CAPSULE | Refills: 0 | Status: SHIPPED | OUTPATIENT
Start: 2019-02-26 | End: 2019-03-08

## 2019-02-26 NOTE — PATIENT INSTRUCTIONS
The foreign body was removed and the wound soaked in peroxide and water.  Clean the area twice a day with soap and water.  Apply the prescription ointment, mupirocin, twice a day and cover with a bandaid.  Watch for signs and symptoms of infection.  If these occur, call.    If you have any questions, please call.  You can reach us at [phone] Monday through Thursday (except holidays) 10 a.m. to 5 p.m. or call Dr. Szymanski/GARIMA Viera     Note:  I do not work on Fridays.  So if you have concerns or questions, please contact your primary care provider team or [facility] or go to the Urgent Care on Friday for concerns.     Thank you for using the [facility]!    PHILIPPE Gonsales, CNP, LULÚP-BC  [facility]    To rate your experience with JOHN Gonsales, click on the link below:        [email or web address]

## 2019-02-26 NOTE — PROGRESS NOTES
Silke Dodd is a 89 y.o. female patient of Ronnie Szymanski MD who presents to the clinic today for        Chief Complaint   Patient presents with    Abscess       right hand index finger    .     HPI     Pt, who is not known to me, reports a new problem to me:  Abscess to Rt index ringer . Was clipping a Uzma pham with scissors and believes she nicked her finger. Initially the finger was just red but progressively formed a pustule. They tried cleaning with peroxide and neosporin but it didn't help    These symptoms began 5 days ago and status is worsening.      Pt denies the following symptoms:  Fever, pain     Aggravating factors include none .     Relieving factors include none .     OTC Medications tried are neosporin.     Prescription medications taken for symptoms are none.     Pertinent medical history:  none     ROS     Constitutional:  No fever, no fatigue.     Skin:  See chief complaint/HPI.     HEENT:  No complaints.     Heart/Lungs:  No complaints     GI/:  No sxs.     MS:  No new problems in bones, joints or muscles.        PAST MEDICAL HISTORY:       Past Medical History:   Diagnosis Date    Acute angle-closure glaucoma of right eye      Cancer       BREAST, UTURINE    Corns and callosities      Depression      Hypertension      Hypothyroid      Osteoporosis, post-menopausal 4/23/2013    Ovarian cancer      Panic disorder           PAST SURGICAL HISTORY:        Past Surgical History:   Procedure Laterality Date    BIOPSY-SENTINEL NODE-Sulphor Coloid Right 1/5/2016     [facility]formed by [facility]    CATARACT EXTRACTION Bilateral       corrected for monovision    HYSTERECTOMY   1975     Cancer cells    MASTECTOMY Right 2015    MASTECTOMY-SIMPLE-Frozen Section Right 1/5/2016     [facility] at [facility]    TOTAL ABDOMINAL HYSTERECTOMY W/ BILATERAL SALPINGOOPHORECTOMY   1975         SOCIAL HISTORY:  Social History                Socioeconomic History    Marital status:        Spouse name: Not on file    Number of children: 3    Years of education: Not on file    Highest education level: Not on file   Social Needs    Financial resource strain: Not on file    Food insecurity - worry: Not on file    Food insecurity - inability: Not on file    Transportation needs - medical: Not on file    Transportation needs - non-medical: Not on file   Occupational History    Not on file   Tobacco Use    Smoking status: Never Smoker    Smokeless tobacco: Never Used   Substance and Sexual Activity    Alcohol use: No    Drug use: No    Sexual activity: No       Birth control/protection: Surgical   Other Topics Concern    Patient feels they ought to cut down on drinking/drug use Not Asked    Patient annoyed by others criticizing their drinking/drug use Not Asked    Patient has felt bad or guilty about drinking/drug use Not Asked    Patient has had a drink/used drugs as an eye opener in the AM Not Asked   Social History Narrative    Not on file            FAMILY HISTORY:        Family History   Problem Relation Age of Onset    Cancer Mother      Breast cancer Mother      Diabetes Father      Stroke Father      Coronary artery disease Father           ALLERGIES AND MEDICATIONS: updated and reviewed.  Review of patient's allergies indicates:  No Known Allergies  Current Medications          Current Outpatient Medications   Medication Sig Dispense Refill    acetaminophen (TYLENOL) 325 MG tablet Take 325 mg by mouth every 6 (six) hours as needed for Pain.        aspirin (ECOTRIN) 81 MG EC tablet Take 81 mg by mouth Daily.        azelastine (ASTELIN) 137 mcg (0.1 %) nasal spray 1 spray by Nasal route 2 (two) times daily.        cholecalciferol, vitamin D3, (VITAMIN D3) 5,000 unit Tab Take 5,000 Units by mouth once daily.        escitalopram oxalate (LEXAPRO) 10 MG tablet Take 1 tablet (10 mg total) by mouth once daily. 90 tablet  3    guaiFENesin (MUCINEX) 600 mg 12 hr tablet Take 1,200 mg by mouth 2 (two) times daily.        levothyroxine (SYNTHROID) 75 MCG tablet Take 1 tablet (75 mcg total) by mouth once daily. 90 tablet 3    metoprolol succinate (TOPROL-XL) 25 MG 24 hr tablet Take 0.5 tablets (12.5 mg total) by mouth once daily. 45 tablet 3    ranitidine (ZANTAC) 150 MG tablet Take 1 tablet (150 mg total) by mouth 2 (two) times daily. 180 tablet 3    triamcinolone (NASACORT) 55 mcg nasal inhaler 2 sprays by Nasal route once daily.        nitrofurantoin (MACRODANTIN) 100 MG capsule Take 1 capsule (100 mg total) by mouth every 12 (twelve) hours. for 10 days 20 capsule 0      No current facility-administered medications for this visit.                PHYSICAL EXAM     Alert, coop 89 y.o. female patient in no distress.         Vitals:     02/26/19 1334   BP: 124/72   Pulse: 93   Temp: 97.5 °F (36.4 °C)      VS reviewed.  VS stable.  CC, nursing note, medications & PMH all reviewed today.     Head:  Normocephalic, atraumatic.     EENT:  Ext nose/ears normal.               Eyes with normal lids, not injected.                Resp:  Respirations even, unlabored     Heart:  Regular rate.  CV:  Cap RF brisk     MS:  Ambulates steady.      NEURO:  Alert and oriented x 4.  Responds appropriately during interaction.                  Sensation to light touch intact over Rt hand .     Skin:  Warm, dry, color good.            An approximated 1.5cm erythematous area with a pustule to the center is noted over the Rt 2nd MCP joint.             Psych:  Responds appropriately throughout the visit.               Relaxed.  Well-groomed     Foreign body of right index finger  -     mupirocin (BACTROBAN) 2 % ointment; Apply topically 2 (two) times daily.  Dispense: 15 g; Refill: 0           Pt today presents with an abscess to the Rt index finger on the MCP joint the started after an injury while clipping a Uzma bush. Pt has been afebrile.   Area of  erythema with pustular center over Rt 2nd MCP joint.   Area cleansed with betadine, lanced with a sterile 18g needle, and drained.   Foreign body observed in the wound bed after cleaned. A small thorn was able to be lifted out of the wound bed with the tip of a needle.   After extraction there is no more purulent d/c and wound bed looks pink and healthy.  Area soaked in hydrogen peroxide, cleaned, antibiotic ointment applied, and covered with a bandaid.      This is a new problem to me.  No work up is planned.       Pt advised to perform comfort measures recommended on patient instruction sheet .     If not better in 3-5 days, the patient is advised to call us.  If worse or concerns, the patient is advised to call us.  Explained exam findings, diagnosis and treatment plan to patient and her daughter.  Questions answered and patient states understanding.

## 2019-02-26 NOTE — MEDICAL/APP STUDENT
Silke Dodd is a 89 y.o. female patient of Ronnie Szymanski MD who presents to the clinic today for   Chief Complaint   Patient presents with    Abscess     right hand index finger    .    HPI    Pt, who is not known to me, reports a new problem to me:  Abscess to Rt index ringer . Was clipping a Uzma pham with scissors and believes she nicked her finger. Initially the finger was just red but progressively formed a pustule. They tried cleaning with peroxide and neosporin but it didn't help  These symptoms began 5 days ago and status is worsening.     Pt denies the following symptoms:  Fever, pain    Aggravating factors include none .    Relieving factors include none .    OTC Medications tried are neosporin.    Prescription medications taken for symptoms are none.    Pertinent medical history:  none    ROS    Constitutional:  No fever, no fatigue.    Skin:  See chief complaint/HPI.    HEENT:  No complaints.    Heart/Lungs:  No complaints    GI/:  No sxs.    MS:  No new problems in bones, joints or muscles.      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    Ovarian cancer     Panic disorder        PAST SURGICAL HISTORY:  Past Surgical History:   Procedure Laterality Date    BIOPSY-SENTINEL NODE-Sulphor Coloid Right 1/5/2016    [facility] OR    CATARACT EXTRACTION Bilateral     corrected for monovision    HYSTERECTOMY  1975    Cancer cells    MASTECTOMY Right 2015    MASTECTOMY-SIMPLE-Frozen Section Right 1/5/2016    [facility] at [facility] OR    TOTAL ABDOMINAL HYSTERECTOMY W/ BILATERAL SALPINGOOPHORECTOMY  1975       SOCIAL HISTORY:  Social History     Socioeconomic History    Marital status:      Spouse name: Not on  file    Number of children: 3    Years of education: Not on file    Highest education level: Not on file   Social Needs    Financial resource strain: Not on file    Food insecurity - worry: Not on file    Food insecurity - inability: Not on file    Transportation needs - medical: Not on file    Transportation needs - non-medical: Not on file   Occupational History    Not on file   Tobacco Use    Smoking status: Never Smoker    Smokeless tobacco: Never Used   Substance and Sexual Activity    Alcohol use: No    Drug use: No    Sexual activity: No     Birth control/protection: Surgical   Other Topics Concern    Patient feels they ought to cut down on drinking/drug use Not Asked    Patient annoyed by others criticizing their drinking/drug use Not Asked    Patient has felt bad or guilty about drinking/drug use Not Asked    Patient has had a drink/used drugs as an eye opener in the AM Not Asked   Social History Narrative    Not on file       FAMILY HISTORY:  Family History   Problem Relation Age of Onset    Cancer Mother     Breast cancer Mother     Diabetes Father     Stroke Father     Coronary artery disease Father        ALLERGIES AND MEDICATIONS: updated and reviewed.  Review of patient's allergies indicates:  No Known Allergies  Current Outpatient Medications   Medication Sig Dispense Refill    acetaminophen (TYLENOL) 325 MG tablet Take 325 mg by mouth every 6 (six) hours as needed for Pain.      aspirin (ECOTRIN) 81 MG EC tablet Take 81 mg by mouth Daily.      azelastine (ASTELIN) 137 mcg (0.1 %) nasal spray 1 spray by Nasal route 2 (two) times daily.      cholecalciferol, vitamin D3, (VITAMIN D3) 5,000 unit Tab Take 5,000 Units by mouth once daily.      escitalopram oxalate (LEXAPRO) 10 MG tablet Take 1 tablet (10 mg total) by mouth once daily. 90 tablet 3    guaiFENesin (MUCINEX) 600 mg 12 hr tablet Take 1,200 mg by mouth 2 (two) times daily.      levothyroxine (SYNTHROID) 75 MCG  tablet Take 1 tablet (75 mcg total) by mouth once daily. 90 tablet 3    metoprolol succinate (TOPROL-XL) 25 MG 24 hr tablet Take 0.5 tablets (12.5 mg total) by mouth once daily. 45 tablet 3    ranitidine (ZANTAC) 150 MG tablet Take 1 tablet (150 mg total) by mouth 2 (two) times daily. 180 tablet 3    triamcinolone (NASACORT) 55 mcg nasal inhaler 2 sprays by Nasal route once daily.      nitrofurantoin (MACRODANTIN) 100 MG capsule Take 1 capsule (100 mg total) by mouth every 12 (twelve) hours. for 10 days 20 capsule 0     No current facility-administered medications for this visit.          PHYSICAL EXAM    Alert, coop 89 y.o. female patient in no distress.    Vitals:    02/26/19 1334   BP: 124/72   Pulse: 93   Temp: 97.5 °F (36.4 °C)     VS reviewed.  VS stable.  CC, nursing note, medications & PMH all reviewed today.    Head:  Normocephalic, atraumatic.    EENT:  Ext nose/ears normal.               Eyes with normal lids, not injected.     Resp:  Respirations even, unlabored    Heart:  Regular rate.  CV:  Cap RF brisk    MS:  Ambulates steady.     NEURO:  Alert and oriented x 4.  Responds appropriately during interaction.                  Sensation to light touch intact over Rt hand .    Skin:  Warm, dry, color good.            An approximated 1.5cm erythematous area with a pustule to the center over the Rt 2nd MCP joint.            Psych:  Responds appropriately throughout the visit.               Relaxed.  Well-groomed    Foreign body of right index finger  -     mupirocin (BACTROBAN) 2 % ointment; Apply topically 2 (two) times daily.  Dispense: 15 g; Refill: 0        Pt today presents with an abscess to the Rt index finger on the MCP joint the started after an injury while clipping a Uzma bush. Pt denies has been afebrile. Area of erythema with pustular center over Rt 2nd MCP joint. Area cleansed with betadine, lanced with a 18g needle, and drained. Foreign body observed in the wound bed after cleaned. A  small thorn was able to be lifted out of the wound bed with the tip of a needle. Area soaked in hydrogen peroxide, cleaned, antibiotic ointment applied, and covered with a bandaid.     This is a new problem to me.  No work up is planned.      Pt advised to perform comfort measures recommended on patient instruction sheet .    If not better in 3-5 days, the patient is advised to call us.  If worse or concerns, the patient is advised to call us.  Explained exam findings, diagnosis and treatment plan to patient.  Questions answered and patient states understanding.

## 2019-02-27 ENCOUNTER — PATIENT MESSAGE (OUTPATIENT)
Dept: FAMILY MEDICINE | Facility: CLINIC | Age: 84
End: 2019-02-27

## 2019-02-27 NOTE — PROGRESS NOTES
Subjective:      Patient ID: Silke Dodd is a 89 y.o. female.    Chief Complaint: Peripheral Neuropathy (Stephon 2/6/19)    Silke is a 89 y.o. female who presents to the clinic for evaluation and treatment of high risk feet. 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 The patient's chief complaint consists of toenails in need of trimming.  Relates noticing occasional discomfort from bilateral 2nd toenail.  Suspects she is having the beginning development of ingrown toenails to these digits.  Denies currently experiencing pain from these sites.  Symptoms are usually exacerbated with wearing closed toe shoes.  Has not attempted to self treat.  Denies any additional pedal complaints.       PCP: Ronnie Szymanski MD    Date Last Seen by PCP: 2/6/19    No results found for: HGBA1C      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    Ovarian cancer     Panic disorder        Past Surgical History:   Procedure Laterality Date    BIOPSY-SENTINEL NODE-Sulphor Coloid Right 1/5/2016    [facility] at [facility] OR    CATARACT EXTRACTION Bilateral     corrected for monovision    HYSTERECTOMY  1975    Cancer cells    MASTECTOMY Right 2015    MASTECTOMY-SIMPLE-Frozen Section Right 1/5/2016    Per[facility] at [facility] OR    TOTAL ABDOMINAL HYSTERECTOMY W/ BILATERAL SALPINGOOPHORECTOMY  1975       Family History   Problem Relation Age of Onset    Cancer Mother     Breast cancer Mother     Diabetes Father     Stroke Father     Coronary artery disease Father        Social History     Socioeconomic History     Marital status:      Spouse name: None    Number of children: 3    Years of education: None    Highest education level: None   Social Needs    Financial resource strain: None    Food insecurity - worry: None    Food insecurity - inability: None    Transportation needs - medical: None    Transportation needs - non-medical: None   Occupational History    None   Tobacco Use    Smoking status: Never Smoker    Smokeless tobacco: Never Used   Substance and Sexual Activity    Alcohol use: No    Drug use: No    Sexual activity: No     Birth control/protection: Surgical   Other Topics Concern    Patient feels they ought to cut down on drinking/drug use Not Asked    Patient annoyed by others criticizing their drinking/drug use Not Asked    Patient has felt bad or guilty about drinking/drug use Not Asked    Patient has had a drink/used drugs as an eye opener in the AM Not Asked   Social History Narrative    None       Current Outpatient Medications   Medication Sig Dispense Refill    acetaminophen (TYLENOL) 325 MG tablet Take 325 mg by mouth every 6 (six) hours as needed for Pain.      aspirin (ECOTRIN) 81 MG EC tablet Take 81 mg by mouth Daily.      azelastine (ASTELIN) 137 mcg (0.1 %) nasal spray 1 spray by Nasal route 2 (two) times daily.      cholecalciferol, vitamin D3, (VITAMIN D3) 5,000 unit Tab Take 5,000 Units by mouth once daily.      escitalopram oxalate (LEXAPRO) 10 MG tablet Take 1 tablet (10 mg total) by mouth once daily. 90 tablet 3    guaiFENesin (MUCINEX) 600 mg 12 hr tablet Take 1,200 mg by mouth 2 (two) times daily.      levothyroxine (SYNTHROID) 75 MCG tablet Take 1 tablet (75 mcg total) by mouth once daily. 90 tablet 3    metoprolol succinate (TOPROL-XL) 25 MG 24 hr tablet Take 0.5 tablets (12.5 mg total) by mouth once daily. 45 tablet 3    mupirocin (BACTROBAN) 2 % ointment Apply topically 2 (two) times daily. 15 g 0    nitrofurantoin (MACRODANTIN) 100 MG capsule Take 1  capsule (100 mg total) by mouth every 12 (twelve) hours. for 10 days 20 capsule 0    ranitidine (ZANTAC) 150 MG tablet Take 1 tablet (150 mg total) by mouth 2 (two) times daily. 180 tablet 3    triamcinolone (NASACORT) 55 mcg nasal inhaler 2 sprays by Nasal route once daily.       No current facility-administered medications for this visit.        Review of patient's allergies indicates:  No Known Allergies      Review of Systems   Constitution: Negative for chills.   Cardiovascular: Negative for claudication and leg swelling.   Skin: Positive for color change and nail changes.   Musculoskeletal: Negative for joint pain and joint swelling.   Neurological: Positive for numbness. Negative for paresthesias.   Psychiatric/Behavioral: Negative for altered mental status.           Objective:      Physical Exam   Constitutional: She is oriented to person, place, and time. She appears well-developed and well-nourished. No distress.   Cardiovascular:   Pulses:       Dorsalis pedis pulses are 1+ on the right side, and 1+ on the left side.        Posterior tibial pulses are 1+ on the right side, and 1+ on the left side.   CFT 3 seconds bilateral.  Pedal hair growth decreased bilateral.   Mild varicosities noted bilateral.  Mild nonpitting edema noted to bilateral lower extremity.  Toes are cool to touch bilateral.     Musculoskeletal: She exhibits edema. She exhibits no tenderness.   Muscle strength 5/5 in all muscle groups bilateral.  No tenderness nor crepitation with ROM of foot/ankle joints bilateral.  No pain with palpation of bilateral foot and ankle.  Bilateral hallux abducto valgus.  Bilateral semi-rigid contracture of toes 2-5.       Neurological: She is alert and oriented to person, place, and time. She has normal strength. A sensory deficit is present.   Protective sensation per McFarland-Jose monofilament is decreased bilateral.  Vibratory sensation is absent bilateral.  Light touch is intact bilateral.   Skin:  Skin is warm, dry and intact. Capillary refill takes less than 2 seconds. No abrasion, no bruising, no burn, no ecchymosis, no laceration, no lesion, no petechiae and no rash noted. She is not diaphoretic. No erythema. No pallor.   Pedal skin appears thin bilateral.  Toenails x 10 appear thickened by 2 mm's, elongated by 4 mm's and discolored with subungual debris.  Slight incurvation noted to the medial and lateral nail borders of bilateral 2nd toenail.  Examination of the skin reveals no evidence of significant maceration, rashes, open lesions, suspicious appearing nevi or other concerning lesions.              Assessment:       Encounter Diagnoses   Name Primary?    Idiopathic peripheral neuropathy Yes    Peripheral vascular disease     Hammer toes of both feet     Onychomycosis due to dermatophyte          Plan:       Silke was seen today for peripheral neuropathy.    Diagnoses and all orders for this visit:    Idiopathic peripheral neuropathy    Peripheral vascular disease    Hammer toes of both feet    Onychomycosis due to dermatophyte      I counseled the patient on her conditions, their implications and medical management.    PVD continues to remain stable with today's exam, will continue to monitor.    Patient advised to continue wearing shoe gear that accommodates for digital deformities.    With patient's permission, nails were aggressively reduced and debrided x 10 to their soft tissue attachment mechanically and with electric , removing all offending nail and debris. Patient relates relief following the procedure. She will continue to monitor the areas daily, inspect her feet, wear protective shoe gear when ambulatory, moisturizer to maintain skin integrity and follow in this office in approximately 2-3 months, sooner p.r.n.    Follow-up in about 3 months (around 5/26/2019).    Narayan Jonas DPM

## 2019-03-25 ENCOUNTER — TELEPHONE (OUTPATIENT)
Dept: FAMILY MEDICINE | Facility: CLINIC | Age: 84
End: 2019-03-25

## 2019-03-25 ENCOUNTER — IMMUNIZATION (OUTPATIENT)
Dept: PHARMACY | Facility: CLINIC | Age: 84
End: 2019-03-25
Payer: MEDICARE

## 2019-03-25 NOTE — TELEPHONE ENCOUNTER
----- Message from Kristian Canales sent at 3/25/2019  8:55 AM CDT -----  Type:  Patient Returning Call    Who Called:  Patient  Who Left Message for Patient:  Na  Does the patient know what this is regarding?:  No  Best Call Back Number:  [phone] (home)

## 2019-03-25 NOTE — TELEPHONE ENCOUNTER
----- Message from Angelito Erickson sent at 3/25/2019  8:11 AM CDT -----  Contact: self   Patient need to speak with a nurse regarding scheduling her shingle shot. Please call back at [phone] (home)

## 2019-04-16 ENCOUNTER — TELEPHONE (OUTPATIENT)
Dept: FAMILY MEDICINE | Facility: CLINIC | Age: 84
End: 2019-04-16

## 2019-04-16 NOTE — TELEPHONE ENCOUNTER
----- Message from Rudy Luna sent at 4/15/2019  9:25 AM CDT -----  Type: Needs Medical Advice    Who Called:  Janneth Gallegos (Daughter)       Best Call Back Number: [phone]  Additional Information: Caller states that she would like a callback regarding rescheduling the patient's Nurse Visit on 04/25

## 2019-04-22 ENCOUNTER — TELEPHONE (OUTPATIENT)
Dept: PODIATRY | Facility: CLINIC | Age: 84
End: 2019-04-22

## 2019-04-22 NOTE — TELEPHONE ENCOUNTER
----- Message from Brooklyn Pelletier sent at 4/22/2019  2:24 PM CDT -----  Contact: amaris Hammond   Type:  Same Day Appointment Request    Caller is requesting a same day appointment.  Caller declined first available appointment listed below.      Name of Caller:  Amaris Lagunas   When is the first available appointment? May 2019  Symptoms:  Infection in big toe  Best Call Back Number:[phone]    Additional Information:   Daughter states [facility] confirmed the infection,daughter want patient to be seen by Dr. Jonas today

## 2019-04-22 NOTE — TELEPHONE ENCOUNTER
Patient coming in for appt at 0740am. Toe is infected per [facility]. Daughter wants Dr. Jonas to see the patient and dx for antibiotics. They did not take cx's when she was there.

## 2019-04-23 ENCOUNTER — OFFICE VISIT (OUTPATIENT)
Dept: PODIATRY | Facility: CLINIC | Age: 84
End: 2019-04-23
Payer: MEDICARE

## 2019-04-23 VITALS — BODY MASS INDEX: 21.94 KG/M2 | WEIGHT: 128.5 LBS | HEIGHT: 64 IN

## 2019-04-23 DIAGNOSIS — I73.9 PERIPHERAL VASCULAR DISEASE: ICD-10-CM

## 2019-04-23 DIAGNOSIS — M79.675 TOE PAIN, LEFT: Primary | ICD-10-CM

## 2019-04-23 DIAGNOSIS — L89.891 PRESSURE ULCER OF TOE OF LEFT FOOT, STAGE 1: ICD-10-CM

## 2019-04-23 PROCEDURE — 99499 RISK ADDL DX/OHS AUDIT: ICD-10-PCS | Mod: HCNC,S$GLB,, | Performed by: PODIATRIST

## 2019-04-23 PROCEDURE — 99999 PR PBB SHADOW E&M-EST. PATIENT-LVL II: CPT | Mod: PBBFAC,HCNC,, | Performed by: PODIATRIST

## 2019-04-23 PROCEDURE — 1101F PT FALLS ASSESS-DOCD LE1/YR: CPT | Mod: HCNC,CPTII,S$GLB, | Performed by: PODIATRIST

## 2019-04-23 PROCEDURE — 99999 PR PBB SHADOW E&M-EST. PATIENT-LVL II: ICD-10-PCS | Mod: PBBFAC,HCNC,, | Performed by: PODIATRIST

## 2019-04-23 PROCEDURE — 99499 UNLISTED E&M SERVICE: CPT | Mod: HCNC,S$GLB,, | Performed by: PODIATRIST

## 2019-04-23 PROCEDURE — 99213 PR OFFICE/OUTPT VISIT, EST, LEVL III, 20-29 MIN: ICD-10-PCS | Mod: HCNC,S$GLB,, | Performed by: PODIATRIST

## 2019-04-23 PROCEDURE — 99213 OFFICE O/P EST LOW 20 MIN: CPT | Mod: HCNC,S$GLB,, | Performed by: PODIATRIST

## 2019-04-23 PROCEDURE — 1101F PR PT FALLS ASSESS DOC 0-1 FALLS W/OUT INJ PAST YR: ICD-10-PCS | Mod: HCNC,CPTII,S$GLB, | Performed by: PODIATRIST

## 2019-04-23 RX ORDER — AMOXICILLIN AND CLAVULANATE POTASSIUM 875; 125 MG/1; MG/1
TABLET, FILM COATED ORAL
COMMUNITY
Start: 2019-04-22 | End: 2019-05-30

## 2019-04-23 NOTE — PROGRESS NOTES
Subjective:      Patient ID: Silke Dodd is a 89 y.o. female.    Chief Complaint: Toe Pain ( possible toe infection left great toe red )    Silke is a 89 y.o. female with a past medical history of Acute angle-closure glaucoma of right eye, Cancer, Corns and callosities, Depression, Hypertension, Hypothyroid, Osteoporosis, post-menopausal (4/23/2013), Ovarian cancer, and Panic disorder, presents to clinic with the chief complaint of sharp pain near the tip of the Lt. Great toe that has been present since Sunday.  Relates noticing pain symptoms following use of a shoe with a small aperture for the toes.  States the tip of the digit became quite red, swollen, and tender which was concerning for infection.  Relates presenting to a [facility], where she was diagnosed with cellulitis and given a prescription for Augmentin.  Relates refraining from use of the oral antibiotic until assessed by me.  Notes that symptoms have improved over the past two days.  Currently rates pain as a 0/10.  Symptoms are exacerbated only by pressure to the digit.  Symptoms are alleviated with rest.  Has been wearing sandals to minimize pressure to the toe with substantial relief.  Denies noticing drainage from the digit.  Denies having N/V/F/C/D.  Denies any additional pedal complaints.      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    Ovarian cancer     Panic disorder        Past Surgical History:   Procedure Laterality Date    BIOPSY-SENTINEL NODE-Sulphor Coloid Right 1/5/2016    [email or web address]    CATARACT EXTRACTION Bilateral     corrected for monovision    HYSTERECTOMY  1975    Cancer cells    MASTECTOMY Right 2015    MASTECTOMY-SIMPLE-Frozen Section Right 1/5/2016    Performed by [facility]ias MD  at [facility] OR    TOTAL ABDOMINAL HYSTERECTOMY W/ BILATERAL SALPINGOOPHORECTOMY  1975       Family History   Problem Relation Age of Onset    Cancer Mother     Breast cancer Mother     Diabetes Father     Stroke Father     Coronary artery disease Father        Social History     Socioeconomic History    Marital status:      Spouse name: Not on file    Number of children: 3    Years of education: Not on file    Highest education level: Not on file   Occupational History    Not on file   Social Needs    Financial resource strain: Not on file    Food insecurity:     Worry: Not on file     Inability: Not on file    Transportation needs:     Medical: Not on file     Non-medical: Not on file   Tobacco Use    Smoking status: Never Smoker    Smokeless tobacco: Never Used   Substance and Sexual Activity    Alcohol use: No    Drug use: No    Sexual activity: Never     Birth control/protection: Surgical   Lifestyle    Physical activity:     Days per week: Not on file     Minutes per session: Not on file    Stress: Not on file   Relationships    Social connections:     Talks on phone: Not on file     Gets together: Not on file     Attends [employer] service: Not on file     Active member of club or organization: Not on file     Attends meetings of clubs or organizations: Not on file     Relationship status: Not on file   Other Topics Concern    Patient feels they ought to cut down on drinking/drug use Not Asked    Patient annoyed by others criticizing their drinking/drug use Not Asked    Patient has felt bad or guilty about drinking/drug use Not Asked    Patient has had a drink/used drugs as an eye opener in the AM Not Asked   Social History Narrative    Not on file       Current Outpatient Medications   Medication Sig Dispense Refill    acetaminophen (TYLENOL) 325 MG tablet Take 325 mg by mouth every 6 (six) hours as needed for Pain.      amoxicillin-clavulanate 875-125mg (AUGMENTIN) 875-125 mg  per tablet       aspirin (ECOTRIN) 81 MG EC tablet Take 81 mg by mouth Daily.      azelastine (ASTELIN) 137 mcg (0.1 %) nasal spray 1 spray by Nasal route 2 (two) times daily.      cholecalciferol, vitamin D3, (VITAMIN D3) 5,000 unit Tab Take 5,000 Units by mouth once daily.      escitalopram oxalate (LEXAPRO) 10 MG tablet Take 1 tablet (10 mg total) by mouth once daily. 90 tablet 3    guaiFENesin (MUCINEX) 600 mg 12 hr tablet Take 1,200 mg by mouth 2 (two) times daily.      levothyroxine (SYNTHROID) 75 MCG tablet Take 1 tablet (75 mcg total) by mouth once daily. 90 tablet 3    metoprolol succinate (TOPROL-XL) 25 MG 24 hr tablet Take 0.5 tablets (12.5 mg total) by mouth once daily. 45 tablet 3    ranitidine (ZANTAC) 150 MG tablet Take 1 tablet (150 mg total) by mouth 2 (two) times daily. 180 tablet 3    triamcinolone (NASACORT) 55 mcg nasal inhaler 2 sprays by Nasal route once daily.      varicella-zoster gE-AS01B, PF, (SHINGRIX, PF,) 50 mcg/0.5 mL injection Inject 0.5 mLs into the muscle. 0.5 mL 1     No current facility-administered medications for this visit.        Review of patient's allergies indicates:  No Known Allergies      Review of Systems   Constitution: Negative for chills.   Cardiovascular: Negative for claudication and leg swelling.   Skin: Positive for color change and nail changes.   Musculoskeletal: Positive for myalgias. Negative for joint pain and joint swelling.   Neurological: Positive for numbness. Negative for paresthesias.   Psychiatric/Behavioral: Negative for altered mental status.           Objective:      Physical Exam   Constitutional: She is oriented to person, place, and time. She appears well-developed and well-nourished. No distress.   Cardiovascular:   Pulses:       Dorsalis pedis pulses are 1+ on the right side, and 1+ on the left side.        Posterior tibial pulses are 1+ on the right side, and 1+ on the left side.   CFT 3 seconds bilateral.  Pedal hair growth  decreased bilateral.   Mild varicosities noted bilateral.  Mild nonpitting edema noted to bilateral lower extremity.  Toes are cool to touch bilateral.     Musculoskeletal: She exhibits edema and tenderness.   Muscle strength 5/5 in all muscle groups bilateral.  No tenderness nor crepitation with ROM of foot/ankle joints bilateral.  Pain with palpation to the distal tip of the Lt. Great toe.  Bilateral hallux abducto valgus.  Bilateral semi-rigid contracture of toes 2-5.       Neurological: She is alert and oriented to person, place, and time. She has normal strength. A sensory deficit is present.   Protective sensation per [place]-Jose monofilament is decreased bilateral.  Vibratory sensation is absent bilateral.  Light touch is intact bilateral.   Skin: Skin is warm, dry and intact. Capillary refill takes less than 2 seconds. No abrasion, no bruising, no burn, no ecchymosis, no laceration, no lesion, no petechiae and no rash noted. She is not diaphoretic. No erythema. No pallor.   Pedal skin appears thin bilateral.  Toenails x 10 appear mycotic.  Slight hyperpigmentation noted to the distal tip of the Lt. Hallux, however, no overt signs of infection nor open wound noted.               Assessment:       Encounter Diagnoses   Name Primary?    Toe pain, left Yes    Pressure ulcer of toe of left foot, stage 1     Peripheral vascular disease          Plan:       Silke was seen today for toe pain.    Diagnoses and all orders for this visit:    Toe pain, left    Pressure ulcer of toe of left foot, stage 1    Peripheral vascular disease      I counseled the patient on her conditions, their implications and medical management.    Suspect patient's symptoms were secondary to a stage 1 pressure ulcer precipitated by shoe gear with a limiting toe box.  There is now prompt capillary fill time and minimal pain on exam.    Fitted and dispensed a foam toe sleeve to protect the digit.    Advised to wear sandals on the  affected foot x 1 week.  Encouraged to avoid use of constricting socks.    Explained that pain and sensitivity should resolve within the next week.    RTC in 1 month for routine follow up.    Narayan Jonas DPM

## 2019-05-13 ENCOUNTER — OFFICE VISIT (OUTPATIENT)
Dept: OPHTHALMOLOGY | Facility: CLINIC | Age: 84
End: 2019-05-13
Payer: MEDICARE

## 2019-05-13 DIAGNOSIS — H35.052 CHOROIDAL NEOVASCULARIZATION, LEFT EYE: Primary | ICD-10-CM

## 2019-05-13 DIAGNOSIS — H43.813 POSTERIOR VITREOUS DETACHMENT, BOTH EYES: ICD-10-CM

## 2019-05-13 PROCEDURE — 92226 PR SPECIAL EYE EXAM, SUBSEQUENT: CPT | Mod: 50,HCNC,S$GLB, | Performed by: OPHTHALMOLOGY

## 2019-05-13 PROCEDURE — 92014 PR EYE EXAM, EST PATIENT,COMPREHESV: ICD-10-PCS | Mod: HCNC,S$GLB,, | Performed by: OPHTHALMOLOGY

## 2019-05-13 PROCEDURE — 99999 PR PBB SHADOW E&M-EST. PATIENT-LVL III: CPT | Mod: PBBFAC,HCNC,, | Performed by: OPHTHALMOLOGY

## 2019-05-13 PROCEDURE — 92226 PR SPECIAL EYE EXAM, SUBSEQUENT: ICD-10-PCS | Mod: 50,HCNC,S$GLB, | Performed by: OPHTHALMOLOGY

## 2019-05-13 PROCEDURE — 99999 PR PBB SHADOW E&M-EST. PATIENT-LVL III: ICD-10-PCS | Mod: PBBFAC,HCNC,, | Performed by: OPHTHALMOLOGY

## 2019-05-13 PROCEDURE — 92014 COMPRE OPH EXAM EST PT 1/>: CPT | Mod: HCNC,S$GLB,, | Performed by: OPHTHALMOLOGY

## 2019-05-13 NOTE — PROGRESS NOTES
HPI     DLS. 04/16/2018    Patient states her vision has been stable since her last visit.     PC IOL OU 2004  In NO monovision  OS is near  S/ P Yag Caps  S/P angle closure surgery Dr. Vega many years ago    Last edited by Carmelina Heaton on 5/13/2019 10:21 AM. (History)          A/P    1. PPCNV OS  Not foveal threatening  Monitor    2. PVD OU    3. PCIOL OU    4. Prior angle closure  S/p Sx         12 months OCT

## 2019-05-22 ENCOUNTER — TELEPHONE (OUTPATIENT)
Dept: OPHTHALMOLOGY | Facility: CLINIC | Age: 84
End: 2019-05-22

## 2019-05-22 NOTE — TELEPHONE ENCOUNTER
----- Message from Cassia Reardon sent at 5/22/2019  8:47 AM CDT -----  Contact: pt  Pt states she was in the office on 5/13 and thinks that she may have left her wrist watch and would like a call if someone in the office found it..[phone] (home)

## 2019-05-23 DIAGNOSIS — F41.9 ANXIETY: ICD-10-CM

## 2019-05-23 RX ORDER — ESCITALOPRAM OXALATE 10 MG/1
10 TABLET ORAL DAILY
Qty: 90 TABLET | Refills: 2 | Status: SHIPPED | OUTPATIENT
Start: 2019-05-23 | End: 2019-08-07

## 2019-05-30 ENCOUNTER — OFFICE VISIT (OUTPATIENT)
Dept: FAMILY MEDICINE | Facility: CLINIC | Age: 84
End: 2019-05-30
Payer: MEDICARE

## 2019-05-30 ENCOUNTER — OFFICE VISIT (OUTPATIENT)
Dept: PODIATRY | Facility: CLINIC | Age: 84
End: 2019-05-30
Payer: MEDICARE

## 2019-05-30 ENCOUNTER — IMMUNIZATION (OUTPATIENT)
Dept: PHARMACY | Facility: CLINIC | Age: 84
End: 2019-05-30
Payer: MEDICARE

## 2019-05-30 VITALS
WEIGHT: 127.19 LBS | HEART RATE: 64 BPM | DIASTOLIC BLOOD PRESSURE: 80 MMHG | OXYGEN SATURATION: 97 % | BODY MASS INDEX: 21.78 KG/M2 | HEIGHT: 64 IN | SYSTOLIC BLOOD PRESSURE: 134 MMHG | BODY MASS INDEX: 21.71 KG/M2 | HEIGHT: 64 IN | RESPIRATION RATE: 20 BRPM | WEIGHT: 127.56 LBS

## 2019-05-30 DIAGNOSIS — I10 ESSENTIAL HYPERTENSION: ICD-10-CM

## 2019-05-30 DIAGNOSIS — I77.9 CAROTID ARTERY DISEASE, UNSPECIFIED LATERALITY, UNSPECIFIED TYPE: ICD-10-CM

## 2019-05-30 DIAGNOSIS — G60.9 IDIOPATHIC PERIPHERAL NEUROPATHY: ICD-10-CM

## 2019-05-30 DIAGNOSIS — Z00.00 ENCOUNTER FOR PREVENTIVE HEALTH EXAMINATION: Primary | ICD-10-CM

## 2019-05-30 DIAGNOSIS — I73.9 PERIPHERAL VASCULAR DISEASE: ICD-10-CM

## 2019-05-30 DIAGNOSIS — M20.41 HAMMER TOES OF BOTH FEET: ICD-10-CM

## 2019-05-30 DIAGNOSIS — G60.9 IDIOPATHIC PERIPHERAL NEUROPATHY: Primary | ICD-10-CM

## 2019-05-30 DIAGNOSIS — B35.1 ONYCHOMYCOSIS DUE TO DERMATOPHYTE: ICD-10-CM

## 2019-05-30 DIAGNOSIS — M81.0 OSTEOPOROSIS, SENILE: ICD-10-CM

## 2019-05-30 DIAGNOSIS — E03.9 HYPOTHYROIDISM, UNSPECIFIED TYPE: ICD-10-CM

## 2019-05-30 DIAGNOSIS — M20.42 HAMMER TOES OF BOTH FEET: ICD-10-CM

## 2019-05-30 DIAGNOSIS — I70.0 AORTIC ATHEROSCLEROSIS: ICD-10-CM

## 2019-05-30 PROCEDURE — 11721 DEBRIDE NAIL 6 OR MORE: CPT | Mod: Q9,HCNC,S$GLB, | Performed by: PODIATRIST

## 2019-05-30 PROCEDURE — G0439 PPPS, SUBSEQ VISIT: HCPCS | Mod: HCNC,S$GLB,, | Performed by: NURSE PRACTITIONER

## 2019-05-30 PROCEDURE — G0439 PR MEDICARE ANNUAL WELLNESS SUBSEQUENT VISIT: ICD-10-PCS | Mod: HCNC,S$GLB,, | Performed by: NURSE PRACTITIONER

## 2019-05-30 PROCEDURE — 99999 PR PBB SHADOW E&M-EST. PATIENT-LVL III: CPT | Mod: PBBFAC,HCNC,, | Performed by: PODIATRIST

## 2019-05-30 PROCEDURE — 11721 PR DEBRIDEMENT OF NAILS, 6 OR MORE: ICD-10-PCS | Mod: Q9,HCNC,S$GLB, | Performed by: PODIATRIST

## 2019-05-30 PROCEDURE — 99499 UNLISTED E&M SERVICE: CPT | Mod: HCNC,S$GLB,, | Performed by: PODIATRIST

## 2019-05-30 PROCEDURE — 99499 NO LOS: ICD-10-PCS | Mod: HCNC,S$GLB,, | Performed by: PODIATRIST

## 2019-05-30 PROCEDURE — 99999 PR PBB SHADOW E&M-EST. PATIENT-LVL V: CPT | Mod: PBBFAC,HCNC,, | Performed by: NURSE PRACTITIONER

## 2019-05-30 PROCEDURE — 99999 PR PBB SHADOW E&M-EST. PATIENT-LVL III: ICD-10-PCS | Mod: PBBFAC,HCNC,, | Performed by: PODIATRIST

## 2019-05-30 PROCEDURE — 99999 PR PBB SHADOW E&M-EST. PATIENT-LVL V: ICD-10-PCS | Mod: PBBFAC,HCNC,, | Performed by: NURSE PRACTITIONER

## 2019-05-30 NOTE — PATIENT INSTRUCTIONS
Counseling and Referral of Other Preventative  (Italic type indicates deductible and co-insurance are waived)    Patient Name: Silke Dodd  Today's Date: 5/30/2019    Health Maintenance       Date Due Completion Date    TETANUS VACCINE 10/01/1946 ---    Influenza Vaccine 08/01/2019 9/25/2018    Override on 10/16/2017: Done    Lipid Panel 02/26/2024 2/26/2019        No orders of the defined types were placed in this encounter.    The following information is provided to all patients.  This information is to help you find resources for any of the problems found today that may be affecting your health:                Living healthy guide: www.[facility].[place]      Understanding Diabetes: www.diabetes.org      Eating healthy: www.cdc.gov/healthyweight      CDC home safety checklist: www.cdc.gov/steadi/patient.html      Agency on Aging: www.goea.[place].[facility]      Alcoholics anonymous (AA): www.aa.org      Physical Activity: www.meghna.nih.gov/yn5kzki      Tobacco use: www.quitwithusla.org

## 2019-05-30 NOTE — PROGRESS NOTES
"Silke Dodd presented for a  Medicare AWV and comprehensive Health Risk Assessment today. The following components were reviewed and updated:    · Medical history  · Family History  · Social history  · Allergies and Current Medications  · Health Risk Assessment  · Health Maintenance  · Care Team     ** See Completed Assessments for Annual Wellness Visit within the encounter summary.**     The following assessments were completed:  · Living Situation  · CAGE  · Depression Screening  · Timed Get Up and Go  · Whisper Test  · Cognitive Function Screening  ·   ·   · Nutrition Screening  · ADL Screening  · PAQ Screening    Vitals:    05/30/19 1040   BP: 134/80   BP Location: Left arm   Patient Position: Sitting   BP Method: Medium (Manual)   Pulse: 64   SpO2: 97%   Weight: 57.7 kg (127 lb 3.3 oz)   Height: 5' 4" (1.626 m)     Body mass index is 21.83 kg/m².  Physical Exam   Constitutional: She is oriented to person, place, and time. She appears well-nourished.   Cardiovascular: Normal rate, regular rhythm, normal heart sounds and intact distal pulses.   Pulmonary/Chest: Effort normal and breath sounds normal.   Neurological: She is alert and oriented to person, place, and time.   Skin: Skin is warm and dry.   Vitals reviewed.        Diagnoses and health risks identified today and associated recommendations/orders:    1. Encounter for preventive health examination  Reviewed and discussed health maintenance.      2. Essential hypertension  Stable- continue current treatment and follow up routinely with PCP     3. Carotid artery disease, unspecified laterality, unspecified type  Stable- continue current treatment and follow up routinely with PCP     4. Aortic atherosclerosis  Stable- continue current treatment and follow up routinely with PCP     5. Peripheral vascular disease  Stable- continue current treatment and follow up routinely with PCP     6. Idiopathic peripheral neuropathy  Stable- continue current treatment and " follow up routinely with PCP and podiatry (.     7. Hypothyroidism, unspecified type  Stable- continue current treatment and follow up routinely with PCP     8. Osteoporosis, senile  Stable- continue current treatment and follow up routinely with PCP     Provided Silke with a 5-10 year written screening schedule and personal prevention plan. Recommendations were developed using the USPSTF age appropriate recommendations. Education, counseling, and referrals were provided as needed. After Visit Summary printed and given to patient which includes a list of additional screenings\tests needed.    Michelle De, NP

## 2019-05-30 NOTE — PROGRESS NOTES
Subjective:      Patient ID: Silke Dodd is a 89 y.o. female.    Chief Complaint: Peripheral Neuropathy (PCP: Stephon seen 2/6/19---- 3 month follow up )    Silke is a 89 y.o. female who presents to the clinic for evaluation and treatment of high risk feet. 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 The patient's chief complaint consists of toenails in need of trimming.  Denies being painful with wearing shoe gear.  Has not attempted to trim on her own.  Relates treating with daily application of vicks vaporub with definite softening of the nail plates.  Has noted no further areas of pressure injury to the toes with discontinuing use of a narrow pair of leather sandals.  Denies any additional pedal complaints.       PCP: Ronnie Szymanski MD    Date Last Seen by PCP: 2/6/19    No results found for: HGBA1C      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    Ovarian cancer     Panic disorder        Past Surgical History:   Procedure Laterality Date    BIOPSY-SENTINEL NODE-Sulphor Coloid Right 1/5/2016    [facility]    CATARACT EXTRACTION Bilateral     corrected for monovision    HYSTERECTOMY  1975    Cancer cells    MASTECTOMY Right 2015    MASTECTOMY-SIMPLE-Frozen Section Right 1/5/2016    [facility]    TOTAL ABDOMINAL HYSTERECTOMY W/ BILATERAL SALPINGOOPHORECTOMY  1975       Family History   Problem Relation Age of Onset    Cancer Mother     Breast cancer Mother     Diabetes Father     Stroke Father     Coronary artery disease Father        Social History      Socioeconomic History    Marital status:      Spouse name: Not on file    Number of children: 3    Years of education: Not on file    Highest education level: Not on file   Occupational History    Not on file   Social Needs    Financial resource strain: Not on file    Food insecurity:     Worry: Not on file     Inability: Not on file    Transportation needs:     Medical: Not on file     Non-medical: Not on file   Tobacco Use    Smoking status: Never Smoker    Smokeless tobacco: Never Used   Substance and Sexual Activity    Alcohol use: No    Drug use: No    Sexual activity: Never     Birth control/protection: Surgical   Lifestyle    Physical activity:     Days per week: Not on file     Minutes per session: Not on file    Stress: Not on file   Relationships    Social connections:     Talks on phone: Not on file     Gets together: Not on file     Attends [employer] service: Not on file     Active member of club or organization: Not on file     Attends meetings of clubs or organizations: Not on file     Relationship status: Not on file   Other Topics Concern    Patient feels they ought to cut down on drinking/drug use Not Asked    Patient annoyed by others criticizing their drinking/drug use Not Asked    Patient has felt bad or guilty about drinking/drug use Not Asked    Patient has had a drink/used drugs as an eye opener in the AM Not Asked   Social History Narrative    Not on file       Current Outpatient Medications   Medication Sig Dispense Refill    acetaminophen (TYLENOL) 325 MG tablet Take 325 mg by mouth every 6 (six) hours as needed for Pain.      aspirin (ECOTRIN) 81 MG EC tablet Take 81 mg by mouth Daily.      azelastine (ASTELIN) 137 mcg (0.1 %) nasal spray 1 spray by Nasal route 2 (two) times daily.      cholecalciferol, vitamin D3, (VITAMIN D3) 5,000 unit Tab Take 5,000 Units by mouth once daily.      escitalopram oxalate (LEXAPRO) 10 MG tablet TAKE 1 TABLET (10 MG  TOTAL) BY MOUTH ONCE DAILY. 90 tablet 2    guaiFENesin (MUCINEX) 600 mg 12 hr tablet Take 1,200 mg by mouth 2 (two) times daily.      levothyroxine (SYNTHROID) 75 MCG tablet Take 1 tablet (75 mcg total) by mouth once daily. 90 tablet 3    metoprolol succinate (TOPROL-XL) 25 MG 24 hr tablet Take 0.5 tablets (12.5 mg total) by mouth once daily. 45 tablet 3    ranitidine (ZANTAC) 150 MG tablet Take 1 tablet (150 mg total) by mouth 2 (two) times daily. 180 tablet 3    triamcinolone (NASACORT) 55 mcg nasal inhaler 2 sprays by Nasal route once daily.      varicella-zoster gE-AS01B, PF, (SHINGRIX, PF,) 50 mcg/0.5 mL injection Inject 0.5 mLs into the muscle. 0.5 mL 1     No current facility-administered medications for this visit.        Review of patient's allergies indicates:  No Known Allergies      Review of Systems   Constitution: Negative for chills.   Cardiovascular: Negative for claudication and leg swelling.   Skin: Positive for color change and nail changes.   Musculoskeletal: Negative for joint pain and joint swelling.   Neurological: Positive for numbness. Negative for paresthesias.   Psychiatric/Behavioral: Negative for altered mental status.           Objective:      Physical Exam   Constitutional: She is oriented to person, place, and time. She appears well-developed and well-nourished. No distress.   Cardiovascular:   Pulses:       Dorsalis pedis pulses are 1+ on the right side, and 1+ on the left side.        Posterior tibial pulses are 1+ on the right side, and 1+ on the left side.   CFT 3 seconds bilateral.  Pedal hair growth decreased bilateral.   Mild varicosities noted bilateral.  Mild nonpitting edema noted to bilateral lower extremity.  Toes are cool to touch bilateral.     Musculoskeletal: She exhibits edema. She exhibits no tenderness.   Muscle strength 5/5 in all muscle groups bilateral.  No tenderness nor crepitation with ROM of foot/ankle joints bilateral.  No pain with palpation of  bilateral foot and ankle.  Bilateral hallux abducto valgus.  Bilateral semi-rigid contracture of toes 2-5.       Neurological: She is alert and oriented to person, place, and time. She has normal strength. A sensory deficit is present.   Protective sensation per [place]-Jose monofilament is decreased bilateral.  Vibratory sensation is absent bilateral.  Light touch is intact bilateral.   Skin: Skin is warm, dry and intact. Capillary refill takes less than 2 seconds. No abrasion, no bruising, no burn, no ecchymosis, no laceration, no lesion, no petechiae and no rash noted. She is not diaphoretic. No erythema. No pallor.   Pedal skin appears thin bilateral.  Toenails x 10 appear thickened by 2 mm's, elongated by 5 mm's and discolored with subungual debris.   Examination of the skin reveals no evidence of significant maceration, rashes, open lesions, suspicious appearing nevi or other concerning lesions.              Assessment:       Encounter Diagnoses   Name Primary?    Idiopathic peripheral neuropathy Yes    Peripheral vascular disease     Hammer toes of both feet     Onychomycosis due to dermatophyte          Plan:       Silke was seen today for peripheral neuropathy.    Diagnoses and all orders for this visit:    Idiopathic peripheral neuropathy    Peripheral vascular disease    Hammer toes of both feet    Onychomycosis due to dermatophyte      I counseled the patient on her conditions, their implications and medical management.    Neuropathy is present yet asymptomatic.    PVD continues to remain stable with today's exam, will continue to monitor.    Patient advised to continue wearing shoe gear that accommodates for digital deformities.  Recommend a shoe with a deep toe box to accommodate for the hammer toes and with laces to provide a snug fit to accommodate her narrow heels.    With patient's permission, nails were aggressively reduced and debrided x 10 to their soft tissue attachment mechanically and  with electric , removing all offending nail and debris. Patient relates relief following the procedure. She will continue to monitor the areas daily, inspect her feet, wear protective shoe gear when ambulatory, moisturizer to maintain skin integrity and follow in this office in approximately 2-3 months, sooner p.r.n.    Follow up in about 3 months (around 8/30/2019).    Narayan Jonas DPM

## 2019-07-03 ENCOUNTER — OFFICE VISIT (OUTPATIENT)
Dept: FAMILY MEDICINE | Facility: CLINIC | Age: 84
End: 2019-07-03
Payer: MEDICARE

## 2019-07-03 VITALS
BODY MASS INDEX: 22.7 KG/M2 | HEART RATE: 64 BPM | SYSTOLIC BLOOD PRESSURE: 140 MMHG | WEIGHT: 132.94 LBS | TEMPERATURE: 98 F | DIASTOLIC BLOOD PRESSURE: 74 MMHG | HEIGHT: 64 IN | OXYGEN SATURATION: 97 %

## 2019-07-03 DIAGNOSIS — J40 BRONCHITIS: Primary | ICD-10-CM

## 2019-07-03 DIAGNOSIS — R35.0 URINARY FREQUENCY: ICD-10-CM

## 2019-07-03 PROCEDURE — 99214 OFFICE O/P EST MOD 30 MIN: CPT | Mod: HCNC,S$GLB,, | Performed by: PHYSICIAN ASSISTANT

## 2019-07-03 PROCEDURE — 99214 PR OFFICE/OUTPT VISIT, EST, LEVL IV, 30-39 MIN: ICD-10-PCS | Mod: HCNC,S$GLB,, | Performed by: PHYSICIAN ASSISTANT

## 2019-07-03 PROCEDURE — 1101F PR PT FALLS ASSESS DOC 0-1 FALLS W/OUT INJ PAST YR: ICD-10-PCS | Mod: HCNC,CPTII,S$GLB, | Performed by: PHYSICIAN ASSISTANT

## 2019-07-03 PROCEDURE — 87088 URINE BACTERIA CULTURE: CPT | Mod: HCNC

## 2019-07-03 PROCEDURE — 87086 URINE CULTURE/COLONY COUNT: CPT | Mod: HCNC

## 2019-07-03 PROCEDURE — 87186 SC STD MICRODIL/AGAR DIL: CPT | Mod: HCNC

## 2019-07-03 PROCEDURE — 1101F PT FALLS ASSESS-DOCD LE1/YR: CPT | Mod: HCNC,CPTII,S$GLB, | Performed by: PHYSICIAN ASSISTANT

## 2019-07-03 PROCEDURE — 99999 PR PBB SHADOW E&M-EST. PATIENT-LVL III: ICD-10-PCS | Mod: PBBFAC,HCNC,, | Performed by: PHYSICIAN ASSISTANT

## 2019-07-03 PROCEDURE — 99999 PR PBB SHADOW E&M-EST. PATIENT-LVL III: CPT | Mod: PBBFAC,HCNC,, | Performed by: PHYSICIAN ASSISTANT

## 2019-07-03 PROCEDURE — 87077 CULTURE AEROBIC IDENTIFY: CPT | Mod: HCNC

## 2019-07-03 RX ORDER — ALBUTEROL SULFATE 90 UG/1
2 AEROSOL, METERED RESPIRATORY (INHALATION) EVERY 6 HOURS PRN
Qty: 1 EACH | Refills: 0 | Status: SHIPPED | OUTPATIENT
Start: 2019-07-03 | End: 2019-07-18

## 2019-07-03 RX ORDER — CEPHALEXIN 500 MG/1
500 CAPSULE ORAL EVERY 12 HOURS
Qty: 20 CAPSULE | Refills: 0 | Status: SHIPPED | OUTPATIENT
Start: 2019-07-03 | End: 2019-07-13

## 2019-07-03 NOTE — PROGRESS NOTES
Subjective:       Patient ID: Silke Dodd is a 89 y.o. female    Chief Complaint: Urinary Frequency; Sore Throat; Cough; and Ear Fullness    HPI  The patient is new to me, PCP is Dr. Szymanski.  She presents today complaining of a productive cough that began 1 week ago.  She denies any fevers, no sinus pressure or pain, no shortness of breath.  She has been taking Mucinex over-the-counter without any relief.  Her daughter is going out of town and is concerned about leaving her while she is having the symptoms.    Also, she is complaining of urinary frequency that began yesterday.  She denies any dysuria, no abdominal pain, no flank pain, no nausea.  She has a history of frequent urinary tract infections in her last 1 was in February, 5 months ago.    She has been seeing her cardiologist, Dr Cordova for her hypertension.  She was admitted to [facility] a few months ago for hypertensive emergency.  Dr. Cordova recently increased her metoprolol to 50 mg daily.    Review of Systems   Constitutional: Negative for chills and fever.   HENT: Positive for congestion, ear pain (Ear pressure bilaterally) and sore throat.    Eyes: Negative for visual disturbance.   Respiratory: Positive for cough.    Cardiovascular: Negative for chest pain.   Gastrointestinal: Negative for abdominal pain, diarrhea, nausea and vomiting.   Genitourinary: Positive for frequency. Negative for difficulty urinating, flank pain and hematuria.   Skin: Negative for rash.   Neurological: Negative for headaches.        Objective:   Physical Exam   Constitutional: She is oriented to person, place, and time. She appears well-developed and well-nourished. No distress.   HENT:   Head: Normocephalic and atraumatic.   Eyes: Pupils are equal, round, and reactive to light. EOM are normal.   Neck: Normal range of motion. Neck supple.   Cardiovascular: Normal rate, regular rhythm, normal heart sounds and intact distal pulses. Exam reveals no gallop and no friction rub.   No  murmur heard.  Pulmonary/Chest: Effort normal and breath sounds normal. No respiratory distress. She has no wheezes. She has no rales. She exhibits no tenderness.   Abdominal: Soft. Bowel sounds are normal. She exhibits no distension and no mass. There is no tenderness. There is no guarding.   Musculoskeletal: Normal range of motion.   Neurological: She is alert and oriented to person, place, and time.   Skin: Skin is warm and dry. No rash noted. She is not diaphoretic.   Psychiatric: She has a normal mood and affect. Her behavior is normal. Judgment and thought content normal.        Assessment:       1. Bronchitis  cephALEXin (KEFLEX) 500 MG capsule    albuterol (PROVENTIL/VENTOLIN HFA) 90 mcg/actuation inhaler   2. Urinary frequency  Urinalysis    Urine culture        Plan:       Bronchitis  -     cephALEXin (KEFLEX) 500 MG capsule; Take 1 capsule (500 mg total) by mouth every 12 (twelve) hours. for 10 days  Dispense: 20 capsule; Refill: 0  -     albuterol (PROVENTIL/VENTOLIN HFA) 90 mcg/actuation inhaler; Inhale 2 puffs into the lungs every 6 (six) hours as needed for Wheezing.  Dispense: 1 each; Refill: 0    Urinary frequency  -     Urinalysis  -     Urine culture

## 2019-07-06 LAB — BACTERIA UR CULT: ABNORMAL

## 2019-07-08 DIAGNOSIS — N30.80 CYSTITIS DUE TO PSEUDOMONAS: Primary | ICD-10-CM

## 2019-07-08 DIAGNOSIS — B96.5 CYSTITIS DUE TO PSEUDOMONAS: Primary | ICD-10-CM

## 2019-07-10 ENCOUNTER — TELEPHONE (OUTPATIENT)
Dept: FAMILY MEDICINE | Facility: CLINIC | Age: 84
End: 2019-07-10

## 2019-07-10 NOTE — TELEPHONE ENCOUNTER
----- Message from Deepa Jacobs sent at 7/10/2019  7:11 AM CDT -----  Patient [phone] is calling to discuss her urine test results as she is confused about the results/please call

## 2019-07-10 NOTE — TELEPHONE ENCOUNTER
Call placed to patient, patient with memory problems asking for information to be repeated frequently about infection and details of treatment.   Advised of results and referral to infections disease.   Thoroughly reviewed symptoms that would warrant her going to the Emergency room. Voiced understanding,   And was able to repeat back symptoms to me.

## 2019-07-11 ENCOUNTER — TELEPHONE (OUTPATIENT)
Dept: INFECTIOUS DISEASES | Facility: CLINIC | Age: 84
End: 2019-07-11

## 2019-07-11 NOTE — TELEPHONE ENCOUNTER
Call placed to patients daughter Janneth yesterday evening to advise that I was calling from Dr Cobb's office(Infectious Disease) to schedule an appt for Mrs Edouard from a referral placed by Jeaneth Viera. There was no answer of phone call, so I left a message that Dr Cobb's first availability was not until 9/12, but we just had a cancellation that created an opening for 7/25 @ 2:00, and that I was scheduling the patient tentatively in that slot until I could speak with someone to confirm that date would be ok. Mrs Lagunas called back to inquire of who I was and why I was scheduling her mother to see an Infectious Disease doctor, when she didn't go to her previous appt with any infectious symptoms, and is still not complaining of any. She was very upset, as she was not aware that her mother had been referred to an Infectious Disease, as she stated no one called to discuss anything with her. I apologized to Mrs Lagunas, as I was not aware that nothing was communicated to her about being referred. Mrs Lagunas was demanding that Dr Szymanski call her this evening. This SN walked downstairs to advise Dr Szymanski of the current situation. Dr Cobb also notified.

## 2019-07-12 ENCOUNTER — PATIENT MESSAGE (OUTPATIENT)
Dept: FAMILY MEDICINE | Facility: CLINIC | Age: 84
End: 2019-07-12

## 2019-07-12 DIAGNOSIS — R82.79 URINE CULTURE POSITIVE: Primary | ICD-10-CM

## 2019-07-15 ENCOUNTER — TELEPHONE (OUTPATIENT)
Dept: UROLOGY | Facility: CLINIC | Age: 84
End: 2019-07-15

## 2019-07-15 NOTE — TELEPHONE ENCOUNTER
----- Message from Lalo Ayers sent at 7/15/2019  7:18 AM CDT -----  Contact: janneth  Type:  Sooner Apoointment Request    Caller is requesting a sooner appointment.  Caller declined first available appointment listed below.  Caller will not accept being placed on the waitlist and is requesting a message be sent to doctor.    Name of Caller:  Patient's daughter Janneth  When is the first available appointment?  07/31  Symptoms:  Bladder infection  Best Call Back Number:  [phone]  Additional Information:  Requesting a call back to confirm appointment,stated  sent a message as well so patient can be seen

## 2019-07-18 ENCOUNTER — OFFICE VISIT (OUTPATIENT)
Dept: UROLOGY | Facility: CLINIC | Age: 84
End: 2019-07-18
Payer: MEDICARE

## 2019-07-18 VITALS
HEART RATE: 60 BPM | SYSTOLIC BLOOD PRESSURE: 167 MMHG | DIASTOLIC BLOOD PRESSURE: 65 MMHG | HEIGHT: 64 IN | WEIGHT: 131.19 LBS | BODY MASS INDEX: 22.4 KG/M2

## 2019-07-18 DIAGNOSIS — R39.15 URINARY URGENCY: ICD-10-CM

## 2019-07-18 DIAGNOSIS — R35.0 INCREASED URINARY FREQUENCY: ICD-10-CM

## 2019-07-18 DIAGNOSIS — N39.0 URINARY TRACT INFECTION WITHOUT HEMATURIA, SITE UNSPECIFIED: Primary | ICD-10-CM

## 2019-07-18 PROCEDURE — 99203 OFFICE O/P NEW LOW 30 MIN: CPT | Mod: HCNC,S$GLB,, | Performed by: UROLOGY

## 2019-07-18 PROCEDURE — 1101F PR PT FALLS ASSESS DOC 0-1 FALLS W/OUT INJ PAST YR: ICD-10-PCS | Mod: HCNC,CPTII,S$GLB, | Performed by: UROLOGY

## 2019-07-18 PROCEDURE — 99999 PR PBB SHADOW E&M-EST. PATIENT-LVL III: CPT | Mod: PBBFAC,HCNC,, | Performed by: UROLOGY

## 2019-07-18 PROCEDURE — 1101F PT FALLS ASSESS-DOCD LE1/YR: CPT | Mod: HCNC,CPTII,S$GLB, | Performed by: UROLOGY

## 2019-07-18 PROCEDURE — 99999 PR PBB SHADOW E&M-EST. PATIENT-LVL III: ICD-10-PCS | Mod: PBBFAC,HCNC,, | Performed by: UROLOGY

## 2019-07-18 PROCEDURE — 99203 PR OFFICE/OUTPT VISIT, NEW, LEVL III, 30-44 MIN: ICD-10-PCS | Mod: HCNC,S$GLB,, | Performed by: UROLOGY

## 2019-07-18 PROCEDURE — 87086 URINE CULTURE/COLONY COUNT: CPT | Mod: HCNC

## 2019-07-18 RX ORDER — METOPROLOL SUCCINATE 25 MG/1
TABLET, EXTENDED RELEASE ORAL 2 TIMES DAILY
COMMUNITY
Start: 2019-07-09 | End: 2020-08-05 | Stop reason: SDUPTHER

## 2019-07-18 NOTE — PROGRESS NOTES
Subjective:       Patient ID: Silke Ddod is a 89 y.o. female.    Chief Complaint: Urinary Tract Infection    HPI     89-year-old with increased urinary frequency in the last 1-2 years.  She also has occasional urgency and has had episodes of incontinence.  She is not wearing pads.  She had a urinalysis several weeks ago which was positive and the culture grew light Pseudomonas.  She denies hematuria and dysuria.  She said she has never had a UTI in her life.  Today she really has no symptoms other than the mild urinary frequency.  Catheterized UA is clear.  Cath:  Urine dipstick shows negative for nitrites, leukocytes, glucose, protein, positive for trace blood    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    Ovarian cancer     Panic disorder      Past Surgical History:   Procedure Laterality Date    BIOPSY-SENTINEL NODE-Sulphor Coloid Right 1/5/2016    [facility]    CATARACT EXTRACTION Bilateral     corrected for monovision    HYSTERECTOMY  1975    Cancer cells    MASTECTOMY Right 2015    MASTECTOMY-SIMPLE-Frozen Section Right 1/5/2016    [facility]    TOTAL ABDOMINAL HYSTERECTOMY W/ BILATERAL SALPINGOOPHORECTOMY  1975       Current Outpatient Medications:     acetaminophen (TYLENOL) 325 MG tablet, Take 325 mg by mouth every 6 (six) hours as needed for Pain., Disp: , Rfl:     aspirin (ECOTRIN) 81 MG EC tablet, Take 81 mg by mouth Daily., Disp: , Rfl:     cholecalciferol, vitamin D3, (VITAMIN D3) 5,000 unit Tab, Take 5,000 Units by mouth once daily., Disp: , Rfl:     escitalopram oxalate (LEXAPRO) 10 MG tablet, TAKE 1 TABLET (10 MG TOTAL) BY MOUTH ONCE DAILY., Disp: 90 tablet, Rfl: 2    guaiFENesin (MUCINEX) 600 mg 12 hr tablet, Take  1,200 mg by mouth 2 (two) times daily., Disp: , Rfl:     levothyroxine (SYNTHROID) 75 MCG tablet, Take 1 tablet (75 mcg total) by mouth once daily., Disp: 90 tablet, Rfl: 3    metoprolol succinate (TOPROL-XL) 25 MG 24 hr tablet, , Disp: , Rfl:     ranitidine (ZANTAC) 150 MG tablet, Take 1 tablet (150 mg total) by mouth 2 (two) times daily., Disp: 180 tablet, Rfl: 3    varicella-zoster gE-AS01B, PF, (SHINGRIX, PF,) 50 mcg/0.5 mL injection, Inject 0.5 mLs into the muscle., Disp: 0.5 mL, Rfl: 1      Review of Systems   Constitutional: Negative for fever.   Eyes: Negative for visual disturbance.   Respiratory: Negative for shortness of breath.    Cardiovascular: Negative for chest pain.   Gastrointestinal: Negative for nausea.   Genitourinary: Positive for frequency. Negative for dysuria, flank pain and hematuria.   Musculoskeletal: Negative for gait problem.   Skin: Negative for rash.   Neurological: Negative for seizures.   Psychiatric/Behavioral: Negative for confusion.       Objective:      Physical Exam   Constitutional: She is oriented to person, place, and time. She appears well-developed and well-nourished.   HENT:   Head: Normocephalic.   Eyes: Conjunctivae and EOM are normal.   Neck: Normal range of motion.   Cardiovascular: Normal rate.   Pulmonary/Chest: Effort normal.   Abdominal: Soft. She exhibits no distension and no mass. There is no tenderness.   Genitourinary:   Genitourinary Comments: Bladder non-tender and nondistended  No CVA tenderness   Musculoskeletal: She exhibits no edema.   Neurological: She is alert and oriented to person, place, and time.   Skin: Skin is warm and dry. No rash noted. No erythema.   Psychiatric: She has a normal mood and affect. Her behavior is normal.   Vitals reviewed.      Assessment:       1. Urinary tract infection without hematuria, site unspecified    2. Increased urinary frequency    3. Urinary urgency        Plan:       Urinary tract infection without hematuria,  site unspecified  -     POCT URINE DIPSTICK WITHOUT MICROSCOPE  -     Urine culture    Increased urinary frequency    Urinary urgency      urine is clear today will follow up repeat urine culture.  She is mostly asymptomatic and no treatment recommended at this time.  We did discuss a trial of medical therapy for her urinary frequency but she is not bothered and prefers to not taking another medication.  We discussed risk factors for urinary tract infections.

## 2019-07-18 NOTE — LETTER
July 18, 2019      Ronnie Szymanski MD  [address] [facility] [address]  [place] [address]           [facility]  [address] [facility] [address] [place] [address]  Phone: [phone]  Fax: [phone]          Patient: Silke Dodd   MR Number: [id number]   YOB: 1928   Date of Visit: 7/18/2019       Dear Dr. Ronnie Szymanski:    Thank you for referring Silke Dodd to me for evaluation. Attached you will find relevant portions of my assessment and plan of care.    If you have questions, please do not hesitate to call me. I look forward to following Silke Dodd along with you.    Sincerely,    GUILLE Bustos MD    Enclosure  CC:  No Recipients    If you would like to receive this communication electronically, please contact [email or web address] or [phone] to request more information on [employer] access.    For providers and/or their staff who would like to refer a patient to [facility], please contact us through our one-stop-shop provider referral line, [facility], [place], at [phone].    If you feel you have received this communication in error or would no longer like to receive these types of communications, please e-mail [email or web address]

## 2019-07-20 LAB — BACTERIA UR CULT: NO GROWTH

## 2019-07-22 ENCOUNTER — PATIENT MESSAGE (OUTPATIENT)
Dept: UROLOGY | Facility: CLINIC | Age: 84
End: 2019-07-22

## 2019-08-07 ENCOUNTER — LAB VISIT (OUTPATIENT)
Dept: LAB | Facility: HOSPITAL | Age: 84
End: 2019-08-07
Attending: FAMILY MEDICINE
Payer: MEDICARE

## 2019-08-07 ENCOUNTER — OFFICE VISIT (OUTPATIENT)
Dept: FAMILY MEDICINE | Facility: CLINIC | Age: 84
End: 2019-08-07
Payer: MEDICARE

## 2019-08-07 ENCOUNTER — TELEPHONE (OUTPATIENT)
Dept: FAMILY MEDICINE | Facility: CLINIC | Age: 84
End: 2019-08-07

## 2019-08-07 VITALS
SYSTOLIC BLOOD PRESSURE: 160 MMHG | BODY MASS INDEX: 22.58 KG/M2 | WEIGHT: 132.25 LBS | DIASTOLIC BLOOD PRESSURE: 88 MMHG | HEART RATE: 59 BPM | HEIGHT: 64 IN

## 2019-08-07 DIAGNOSIS — E03.9 HYPOTHYROIDISM, UNSPECIFIED TYPE: ICD-10-CM

## 2019-08-07 DIAGNOSIS — F41.9 ANXIETY: ICD-10-CM

## 2019-08-07 DIAGNOSIS — I10 ESSENTIAL HYPERTENSION: ICD-10-CM

## 2019-08-07 DIAGNOSIS — E03.9 HYPOTHYROIDISM, UNSPECIFIED TYPE: Primary | ICD-10-CM

## 2019-08-07 LAB — TSH SERPL DL<=0.005 MIU/L-ACNC: 0.62 UIU/ML (ref 0.4–4)

## 2019-08-07 PROCEDURE — 1101F PR PT FALLS ASSESS DOC 0-1 FALLS W/OUT INJ PAST YR: ICD-10-PCS | Mod: HCNC,CPTII,S$GLB, | Performed by: FAMILY MEDICINE

## 2019-08-07 PROCEDURE — 99999 PR PBB SHADOW E&M-EST. PATIENT-LVL III: ICD-10-PCS | Mod: PBBFAC,HCNC,, | Performed by: FAMILY MEDICINE

## 2019-08-07 PROCEDURE — 1101F PT FALLS ASSESS-DOCD LE1/YR: CPT | Mod: HCNC,CPTII,S$GLB, | Performed by: FAMILY MEDICINE

## 2019-08-07 PROCEDURE — 36415 COLL VENOUS BLD VENIPUNCTURE: CPT | Mod: HCNC,PO

## 2019-08-07 PROCEDURE — 99214 OFFICE O/P EST MOD 30 MIN: CPT | Mod: HCNC,S$GLB,, | Performed by: FAMILY MEDICINE

## 2019-08-07 PROCEDURE — 99999 PR PBB SHADOW E&M-EST. PATIENT-LVL III: CPT | Mod: PBBFAC,HCNC,, | Performed by: FAMILY MEDICINE

## 2019-08-07 PROCEDURE — 99214 PR OFFICE/OUTPT VISIT, EST, LEVL IV, 30-39 MIN: ICD-10-PCS | Mod: HCNC,S$GLB,, | Performed by: FAMILY MEDICINE

## 2019-08-07 PROCEDURE — 84443 ASSAY THYROID STIM HORMONE: CPT | Mod: HCNC

## 2019-08-07 RX ORDER — ESCITALOPRAM OXALATE 20 MG/1
20 TABLET ORAL DAILY
Qty: 90 TABLET | Refills: 3 | Status: SHIPPED | OUTPATIENT
Start: 2019-08-07 | End: 2020-09-22 | Stop reason: SDUPTHER

## 2019-08-07 NOTE — PROGRESS NOTES
Subjective:       Patient ID: Silke Dodd is a 89 y.o. female    Chief Complaint: Hypertension (here for f/u. Hm due tetanus)    HPI  Here today for interval evaluation  She has increased anxiety  She has had increased hypertension for which she is seeing Cardiology with recent increase in her medication    Review of Systems   Constitutional: Negative for activity change and unexpected weight change.   HENT: Negative for hearing loss, rhinorrhea and trouble swallowing.    Eyes: Negative for discharge and visual disturbance.   Respiratory: Negative for chest tightness and wheezing.    Cardiovascular: Negative for chest pain and palpitations.   Gastrointestinal: Negative for blood in stool, constipation, diarrhea and vomiting.   Endocrine: Negative for polydipsia and polyuria.   Genitourinary: Negative for difficulty urinating, dysuria, hematuria and menstrual problem.   Musculoskeletal: Negative for arthralgias, joint swelling and neck pain.   Neurological: Negative for weakness and headaches.   Psychiatric/Behavioral: Negative for confusion and dysphoric mood.        Objective:   Physical Exam   Constitutional: She is oriented to person, place, and time. She appears well-developed and well-nourished.   Neurological: She is alert and oriented to person, place, and time.   Skin:   Skin tear on left forearm   Vitals reviewed.       Assessment:       1. Hypothyroidism, unspecified type  TSH   2. Essential hypertension     3. Anxiety  escitalopram oxalate (LEXAPRO) 20 MG tablet        Plan:       Hypothyroidism, unspecified type  -     TSH; Future; Expected date: 08/07/2019  - Continue current therapy    Essential hypertension  - Continue current therapy  - Serial blood pressure monitoring  - Diet and exercise education.    Anxiety  -     INCREASE escitalopram oxalate (LEXAPRO) 20 MG tablet; Take 1 tablet (20 mg total) by mouth once daily.  Dispense: 90 tablet; Refill: 3  - Follow up in about 3 months (around 11/7/2019).

## 2019-09-03 ENCOUNTER — OFFICE VISIT (OUTPATIENT)
Dept: PODIATRY | Facility: CLINIC | Age: 84
End: 2019-09-03
Payer: MEDICARE

## 2019-09-03 VITALS
HEIGHT: 64 IN | HEART RATE: 64 BPM | DIASTOLIC BLOOD PRESSURE: 74 MMHG | WEIGHT: 133.69 LBS | BODY MASS INDEX: 22.82 KG/M2 | SYSTOLIC BLOOD PRESSURE: 185 MMHG

## 2019-09-03 DIAGNOSIS — M20.41 HAMMER TOES OF BOTH FEET: ICD-10-CM

## 2019-09-03 DIAGNOSIS — M20.42 HAMMER TOES OF BOTH FEET: ICD-10-CM

## 2019-09-03 DIAGNOSIS — G60.9 IDIOPATHIC PERIPHERAL NEUROPATHY: Primary | ICD-10-CM

## 2019-09-03 DIAGNOSIS — B35.1 ONYCHOMYCOSIS DUE TO DERMATOPHYTE: ICD-10-CM

## 2019-09-03 DIAGNOSIS — I73.9 PERIPHERAL VASCULAR DISEASE: ICD-10-CM

## 2019-09-03 PROCEDURE — 99499 UNLISTED E&M SERVICE: CPT | Mod: HCNC,S$GLB,, | Performed by: PODIATRIST

## 2019-09-03 PROCEDURE — 11721 DEBRIDE NAIL 6 OR MORE: CPT | Mod: Q9,HCNC,S$GLB, | Performed by: PODIATRIST

## 2019-09-03 PROCEDURE — 99999 PR PBB SHADOW E&M-EST. PATIENT-LVL III: CPT | Mod: PBBFAC,HCNC,, | Performed by: PODIATRIST

## 2019-09-03 PROCEDURE — 99499 NO LOS: ICD-10-PCS | Mod: HCNC,S$GLB,, | Performed by: PODIATRIST

## 2019-09-03 PROCEDURE — 11721 PR DEBRIDEMENT OF NAILS, 6 OR MORE: ICD-10-PCS | Mod: Q9,HCNC,S$GLB, | Performed by: PODIATRIST

## 2019-09-03 PROCEDURE — 99999 PR PBB SHADOW E&M-EST. PATIENT-LVL III: ICD-10-PCS | Mod: PBBFAC,HCNC,, | Performed by: PODIATRIST

## 2019-09-03 NOTE — PROGRESS NOTES
Subjective:      Patient ID: Silke Dodd is a 89 y.o. female.    Chief Complaint: Peripheral Vascular Disease ([place] 8/7/19)    Silke is a 89 y.o. female who presents to the clinic for evaluation and treatment of high risk feet. 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 The patient's chief complaint consists of toenails in need of trimming.  Relates having pain to the tip of the Lt. Great toenail.  Rates pain as a 3/10.  Symptoms are absent with wearing open toe shoes.  Symptoms continue to be exacerbated with wearing closed toe shoes.  Has not attempted to trim on her own.  Denies any additional pedal complaints.       PCP: Ronnie Szymanski MD    Date Last Seen by PCP: 8/7/19    No results found for: HGBA1C      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    Ovarian cancer     Panic disorder        Past Surgical History:   Procedure Laterality Date    BIOPSY-SENTINEL NODE-Sulphor Coloid Right 1/5/2016    [facility]    CATARACT EXTRACTION Bilateral     corrected for monovision    HYSTERECTOMY  1975    Cancer cells    MASTECTOMY Right 2015    MASTECTOMY-SIMPLE-Frozen Section Right 1/5/2016    [facility]    TOTAL ABDOMINAL HYSTERECTOMY W/ BILATERAL SALPINGOOPHORECTOMY  1975       Family History   Problem Relation Age of Onset    Cancer Mother     Breast cancer Mother     Diabetes Father     Stroke Father     Coronary artery disease Father        Social History     Socioeconomic History    Marital status:      Spouse name: Not on file    Number  of children: 3    Years of education: Not on file    Highest education level: Not on file   Occupational History    Not on file   Social Needs    Financial resource strain: Not on file    Food insecurity:     Worry: Not on file     Inability: Not on file    Transportation needs:     Medical: Not on file     Non-medical: Not on file   Tobacco Use    Smoking status: Never Smoker    Smokeless tobacco: Never Used   Substance and Sexual Activity    Alcohol use: No    Drug use: No    Sexual activity: Never     Birth control/protection: Surgical   Lifestyle    Physical activity:     Days per week: Not on file     Minutes per session: Not on file    Stress: Not on file   Relationships    Social connections:     Talks on phone: Not on file     Gets together: Not on file     Attends Sikh service: Not on file     Active member of club or organization: Not on file     Attends meetings of clubs or organizations: Not on file     Relationship status: Not on file   Other Topics Concern    Patient feels they ought to cut down on drinking/drug use Not Asked    Patient annoyed by others criticizing their drinking/drug use Not Asked    Patient has felt bad or guilty about drinking/drug use Not Asked    Patient has had a drink/used drugs as an eye opener in the AM Not Asked   Social History Narrative    Not on file       Current Outpatient Medications   Medication Sig Dispense Refill    acetaminophen (TYLENOL) 325 MG tablet Take 325 mg by mouth every 6 (six) hours as needed for Pain.      aspirin (ECOTRIN) 81 MG EC tablet Take 81 mg by mouth Daily.      cholecalciferol, vitamin D3, (VITAMIN D3) 5,000 unit Tab Take 5,000 Units by mouth once daily.      escitalopram oxalate (LEXAPRO) 20 MG tablet Take 1 tablet (20 mg total) by mouth once daily. 90 tablet 3    guaiFENesin (MUCINEX) 600 mg 12 hr tablet Take 1,200 mg by mouth 2 (two) times daily.      levothyroxine (SYNTHROID) 75 MCG tablet Take 1 tablet (75 mcg  total) by mouth once daily. 90 tablet 3    metoprolol succinate (TOPROL-XL) 25 MG 24 hr tablet 2 (two) times daily.       ranitidine (ZANTAC) 150 MG tablet Take 1 tablet (150 mg total) by mouth 2 (two) times daily. 180 tablet 3    varicella-zoster gE-AS01B, PF, (SHINGRIX, PF,) 50 mcg/0.5 mL injection Inject 0.5 mLs into the muscle. 0.5 mL 1     No current facility-administered medications for this visit.        Review of patient's allergies indicates:  No Known Allergies      Review of Systems   Constitution: Negative for chills.   Cardiovascular: Negative for claudication and leg swelling.   Skin: Positive for color change and nail changes.   Musculoskeletal: Negative for joint pain and joint swelling.   Neurological: Positive for numbness. Negative for paresthesias.   Psychiatric/Behavioral: Negative for altered mental status.           Objective:      Physical Exam   Constitutional: She is oriented to person, place, and time. She appears well-developed and well-nourished. No distress.   Cardiovascular:   Pulses:       Dorsalis pedis pulses are 1+ on the right side, and 1+ on the left side.        Posterior tibial pulses are 1+ on the right side, and 1+ on the left side.   CFT 3 seconds bilateral.  Pedal hair growth decreased bilateral.   Mild varicosities noted bilateral.  Mild nonpitting edema noted to bilateral lower extremity.  Toes are cool to touch bilateral.     Musculoskeletal: She exhibits edema and tenderness.   Muscle strength 5/5 in all muscle groups bilateral.  No tenderness nor crepitation with ROM of foot/ankle joints bilateral.  Mild pain with palpation to the subungual exostoses at the distal tip of the Lt. hallux.  Bilateral hallux abducto valgus.  Bilateral semi-rigid contracture of toes 2-5.       Neurological: She is alert and oriented to person, place, and time. She has normal strength. A sensory deficit is present.   Protective sensation per [place] monofilament is absent  bilateral.  Vibratory sensation is absent bilateral.  Light touch is absent bilateral.   Skin: Skin is warm, dry and intact. Capillary refill takes less than 2 seconds. No abrasion, no bruising, no burn, no ecchymosis, no laceration, no lesion, no petechiae and no rash noted. She is not diaphoretic. No erythema. No pallor.   Pedal skin appears thin bilateral.  Toenails x 10 appear thickened by 2 mm's, elongated by 4 mm's and discolored with subungual debris.   Examination of the skin reveals no evidence of significant maceration, rashes, open lesions, suspicious appearing nevi or other concerning lesions.              Assessment:       Encounter Diagnoses   Name Primary?    Idiopathic peripheral neuropathy Yes    Peripheral vascular disease     Hammer toes of both feet     Onychomycosis due to dermatophyte          Plan:       Silke was seen today for peripheral vascular disease.    Diagnoses and all orders for this visit:    Idiopathic peripheral neuropathy    Peripheral vascular disease    Hammer toes of both feet    Onychomycosis due to dermatophyte      I counseled the patient on her conditions, their implications and medical management.    Neuropathy of the lower extremities appears worse with today's exam.  Being she denies overt neuropathy pain, no further intervention is warranted.    PVD continues to remain stable with today's exam, will continue to monitor.    Patient advised to continue wearing shoe gear that accommodates for digital deformities.      Fitted and dispensed a silicone toe sleeve to limit excessive pressure to the Lt. Hallux subungual exostosis.    With patient's permission, nails were aggressively reduced and debrided x 10 to their soft tissue attachment mechanically and with electric , removing all offending nail and debris. Patient relates relief following the procedure. She will continue to monitor the areas daily, inspect her feet, wear protective shoe gear when ambulatory,  moisturizer to maintain skin integrity and follow in this office in approximately 2-3 months, sooner p.r.n.    Follow up in about 3 months (around 12/3/2019).    Narayan Jonas DPM

## 2019-10-29 ENCOUNTER — TELEPHONE (OUTPATIENT)
Dept: FAMILY MEDICINE | Facility: CLINIC | Age: 84
End: 2019-10-29

## 2019-10-29 DIAGNOSIS — Z12.31 ENCOUNTER FOR SCREENING MAMMOGRAM FOR MALIGNANT NEOPLASM OF BREAST: Primary | ICD-10-CM

## 2019-10-29 NOTE — TELEPHONE ENCOUNTER
----- Message from Gwen Zuleta sent at 10/29/2019  8:48 AM CDT -----  Type: Needs order for mammogram    Who Called:  Patient  Best Call Back Number: [phone]  Additional Information: Patient needs order for mammogram/please order and call back to schedule. (Former breast cancer patient)

## 2019-11-25 ENCOUNTER — HOSPITAL ENCOUNTER (OUTPATIENT)
Dept: RADIOLOGY | Facility: HOSPITAL | Age: 84
Discharge: HOME | End: 2019-11-25
Attending: FAMILY MEDICINE
Payer: MEDICARE

## 2019-11-25 DIAGNOSIS — Z12.31 ENCOUNTER FOR SCREENING MAMMOGRAM FOR MALIGNANT NEOPLASM OF BREAST: ICD-10-CM

## 2019-11-25 PROCEDURE — 77067 SCR MAMMO BI INCL CAD: CPT | Mod: 26,HCNC,, | Performed by: RADIOLOGY

## 2019-11-25 PROCEDURE — 77067 MAMMO DIGITAL SCREENING LEFT WITH TOMOSYNTHESIS_CAD: ICD-10-PCS | Mod: 26,HCNC,, | Performed by: RADIOLOGY

## 2019-11-25 PROCEDURE — 77067 SCR MAMMO BI INCL CAD: CPT | Mod: TC,HCNC,PO

## 2019-11-25 PROCEDURE — 77063 MAMMO DIGITAL SCREENING LEFT WITH TOMOSYNTHESIS_CAD: ICD-10-PCS | Mod: 26,HCNC,, | Performed by: RADIOLOGY

## 2019-11-25 PROCEDURE — 77063 BREAST TOMOSYNTHESIS BI: CPT | Mod: 26,HCNC,, | Performed by: RADIOLOGY

## 2019-12-03 ENCOUNTER — OFFICE VISIT (OUTPATIENT)
Dept: PODIATRY | Facility: CLINIC | Age: 84
End: 2019-12-03
Payer: MEDICARE

## 2019-12-03 VITALS
WEIGHT: 133.63 LBS | BODY MASS INDEX: 22.81 KG/M2 | HEIGHT: 64 IN | HEART RATE: 62 BPM | SYSTOLIC BLOOD PRESSURE: 181 MMHG | DIASTOLIC BLOOD PRESSURE: 68 MMHG

## 2019-12-03 DIAGNOSIS — G60.9 IDIOPATHIC PERIPHERAL NEUROPATHY: Primary | ICD-10-CM

## 2019-12-03 DIAGNOSIS — M20.41 HAMMER TOES OF BOTH FEET: ICD-10-CM

## 2019-12-03 DIAGNOSIS — B35.1 ONYCHOMYCOSIS DUE TO DERMATOPHYTE: ICD-10-CM

## 2019-12-03 DIAGNOSIS — M20.42 HAMMER TOES OF BOTH FEET: ICD-10-CM

## 2019-12-03 DIAGNOSIS — I73.9 PERIPHERAL VASCULAR DISEASE: ICD-10-CM

## 2019-12-03 PROCEDURE — 99499 UNLISTED E&M SERVICE: CPT | Mod: Q9,HCNC,S$GLB, | Performed by: PODIATRIST

## 2019-12-03 PROCEDURE — 99999 PR PBB SHADOW E&M-EST. PATIENT-LVL III: ICD-10-PCS | Mod: PBBFAC,HCNC,, | Performed by: PODIATRIST

## 2019-12-03 PROCEDURE — 99999 PR PBB SHADOW E&M-EST. PATIENT-LVL III: CPT | Mod: PBBFAC,HCNC,, | Performed by: PODIATRIST

## 2019-12-03 PROCEDURE — 11721 PR DEBRIDEMENT OF NAILS, 6 OR MORE: ICD-10-PCS | Mod: HCNC,S$GLB,, | Performed by: PODIATRIST

## 2019-12-03 PROCEDURE — 99499 NO LOS: ICD-10-PCS | Mod: Q9,HCNC,S$GLB, | Performed by: PODIATRIST

## 2019-12-03 PROCEDURE — 11721 DEBRIDE NAIL 6 OR MORE: CPT | Mod: HCNC,S$GLB,, | Performed by: PODIATRIST

## 2019-12-03 NOTE — PROGRESS NOTES
Subjective:      Patient ID: Silke Dodd is a 89 y.o. female.    Chief Complaint: Peripheral Neuropathy (Stephon 8/7/19)    Silke is a 89 y.o. female who presents to the clinic for evaluation and treatment of high risk feet. 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 The patient's chief complaint consists of toenails in need of trimming.  Denies being painful with wearing shoe gear.  Has not attempted to trim on her own.  Denies symptoms of claudication or neuropathy pain.  Denies any additional pedal complaints.       PCP: Ronnie Szymanski MD    Date Last Seen by PCP: 8/7/19    No results found for: HGBA1C      Past Medical History:   Diagnosis Date    Acute angle-closure glaucoma of right eye     Breast cancer 2015    Cancer     BREAST, UTURINE    Corns and callosities     Depression     Hypertension     Hypothyroid     Osteoporosis, post-menopausal 4/23/2013    Ovarian cancer     Panic disorder        Past Surgical History:   Procedure Laterality Date    CATARACT EXTRACTION Bilateral     corrected for monovision    HYSTERECTOMY  1975    Cancer cells    MASTECTOMY Right 2015    TOTAL ABDOMINAL HYSTERECTOMY W/ BILATERAL SALPINGOOPHORECTOMY  1975       Family History   Problem Relation Age of Onset    Cancer Mother     Breast cancer Mother     Diabetes Father     Stroke Father     Coronary artery disease Father        Social History     Socioeconomic History    Marital status:      Spouse name: Not on file    Number of children: 3    Years of education: Not on file    Highest education level: Not on file   Occupational History    Not on file   Social Needs    Financial resource strain: Not on file    Food insecurity:     Worry: Not on file     Inability: Not on file     Transportation needs:     Medical: Not on file     Non-medical: Not on file   Tobacco Use    Smoking status: Never Smoker    Smokeless tobacco: Never Used   Substance and Sexual Activity    Alcohol use: No    Drug use: No    Sexual activity: Never     Birth control/protection: Surgical   Lifestyle    Physical activity:     Days per week: Not on file     Minutes per session: Not on file    Stress: Not on file   Relationships    Social connections:     Talks on phone: Not on file     Gets together: Not on file     Attends Jewish service: Not on file     Active member of club or organization: Not on file     Attends meetings of clubs or organizations: Not on file     Relationship status: Not on file   Other Topics Concern    Patient feels they ought to cut down on drinking/drug use Not Asked    Patient annoyed by others criticizing their drinking/drug use Not Asked    Patient has felt bad or guilty about drinking/drug use Not Asked    Patient has had a drink/used drugs as an eye opener in the AM Not Asked   Social History Narrative    Not on file       Current Outpatient Medications   Medication Sig Dispense Refill    acetaminophen (TYLENOL) 325 MG tablet Take 325 mg by mouth every 6 (six) hours as needed for Pain.      aspirin (ECOTRIN) 81 MG EC tablet Take 81 mg by mouth Daily.      cholecalciferol, vitamin D3, (VITAMIN D3) 5,000 unit Tab Take 5,000 Units by mouth once daily.      escitalopram oxalate (LEXAPRO) 20 MG tablet Take 1 tablet (20 mg total) by mouth once daily. 90 tablet 3    guaiFENesin (MUCINEX) 600 mg 12 hr tablet Take 1,200 mg by mouth 2 (two) times daily.      levothyroxine (SYNTHROID) 75 MCG tablet Take 1 tablet (75 mcg total) by mouth once daily. 90 tablet 3    metoprolol succinate (TOPROL-XL) 25 MG 24 hr tablet 2 (two) times daily.       ranitidine (ZANTAC) 150 MG tablet Take 1 tablet (150 mg total) by mouth 2 (two) times daily. 180 tablet 3    varicella-zoster gE-AS01B,  PF, (SHINGRIX, PF,) 50 mcg/0.5 mL injection Inject 0.5 mLs into the muscle. 0.5 mL 1     No current facility-administered medications for this visit.        Review of patient's allergies indicates:  No Known Allergies      Review of Systems   Constitution: Negative for chills.   Cardiovascular: Negative for claudication and leg swelling.   Skin: Positive for color change and nail changes.   Musculoskeletal: Negative for joint pain and joint swelling.   Neurological: Positive for numbness. Negative for paresthesias.   Psychiatric/Behavioral: Negative for altered mental status.           Objective:      Physical Exam   Constitutional: She is oriented to person, place, and time. She appears well-developed and well-nourished. No distress.   Cardiovascular:   Pulses:       Dorsalis pedis pulses are 1+ on the right side, and 1+ on the left side.        Posterior tibial pulses are 1+ on the right side, and 1+ on the left side.   CFT 3 seconds bilateral.  Pedal hair growth decreased bilateral.   Mild varicosities noted bilateral.  Mild nonpitting edema noted to bilateral lower extremity.  Toes are cool to touch bilateral.     Musculoskeletal: She exhibits edema. She exhibits no tenderness.   Muscle strength 5/5 in all muscle groups bilateral.  No tenderness nor crepitation with ROM of foot/ankle joints bilateral.  No pain with palpation to bilateral foot and ankle.  Bilateral hallux abducto valgus.  Bilateral semi-rigid contracture of toes 2-5.       Neurological: She is alert and oriented to person, place, and time. She has normal strength. A sensory deficit is present.   Protective sensation per [place] monofilament is absent bilateral.  Vibratory sensation is absent bilateral.  Light touch is absent bilateral.   Skin: Skin is warm, dry and intact. Capillary refill takes less than 2 seconds. No abrasion, no bruising, no burn, no ecchymosis, no laceration, no lesion, no petechiae and no rash noted. She is not  diaphoretic. No erythema. No pallor.   Pedal skin appears thin bilateral.  Toenails x 10 appear thickened by 2 mm's, elongated by 3 mm's and discolored with subungual debris.  Slight incurvation noted to the medial border of the Rt. Hallux toenail and lateral border of the Lt. Hallux toenail.  No localized sign of infection is present.  Examination of the skin reveals no evidence of significant maceration, rashes, open lesions, suspicious appearing nevi or other concerning lesions.              Assessment:       Encounter Diagnoses   Name Primary?    Idiopathic peripheral neuropathy Yes    Peripheral vascular disease     Hammer toes of both feet     Onychomycosis due to dermatophyte          Plan:       Silke was seen today for peripheral neuropathy.    Diagnoses and all orders for this visit:    Idiopathic peripheral neuropathy    Peripheral vascular disease    Hammer toes of both feet    Onychomycosis due to dermatophyte      I counseled the patient on her conditions, their implications and medical management.    PVD continues to remain stable with today's exam, will continue to monitor.    Patient advised to continue wearing shoe gear that accommodates for digital deformities.      With patient's permission, nails were aggressively reduced and debrided x 10 to their soft tissue attachment mechanically and with electric , removing all offending nail and debris. Patient relates relief following the procedure. She will continue to monitor the areas daily, inspect her feet, wear protective shoe gear when ambulatory, moisturizer to maintain skin integrity and follow in this office in approximately 2-3 months, sooner p.r.n.    Follow up in about 3 months (around 3/3/2020).    Narayan Jonas DPM

## 2019-12-16 ENCOUNTER — PATIENT MESSAGE (OUTPATIENT)
Dept: PODIATRY | Facility: CLINIC | Age: 84
End: 2019-12-16

## 2020-01-27 ENCOUNTER — PATIENT MESSAGE (OUTPATIENT)
Dept: FAMILY MEDICINE | Facility: CLINIC | Age: 85
End: 2020-01-27

## 2020-01-28 ENCOUNTER — TELEPHONE (OUTPATIENT)
Dept: OPHTHALMOLOGY | Facility: CLINIC | Age: 85
End: 2020-01-28

## 2020-02-03 ENCOUNTER — PATIENT MESSAGE (OUTPATIENT)
Dept: FAMILY MEDICINE | Facility: CLINIC | Age: 85
End: 2020-02-03

## 2020-02-03 ENCOUNTER — OFFICE VISIT (OUTPATIENT)
Dept: FAMILY MEDICINE | Facility: CLINIC | Age: 85
End: 2020-02-03
Payer: MEDICARE

## 2020-02-03 VITALS
SYSTOLIC BLOOD PRESSURE: 156 MMHG | HEIGHT: 64 IN | HEART RATE: 72 BPM | WEIGHT: 138.69 LBS | DIASTOLIC BLOOD PRESSURE: 70 MMHG | OXYGEN SATURATION: 97 % | BODY MASS INDEX: 23.68 KG/M2

## 2020-02-03 DIAGNOSIS — E03.9 HYPOTHYROIDISM, UNSPECIFIED TYPE: ICD-10-CM

## 2020-02-03 DIAGNOSIS — I73.9 PERIPHERAL VASCULAR DISEASE: ICD-10-CM

## 2020-02-03 DIAGNOSIS — I10 ESSENTIAL HYPERTENSION: Primary | ICD-10-CM

## 2020-02-03 PROCEDURE — 99999 PR PBB SHADOW E&M-EST. PATIENT-LVL III: CPT | Mod: PBBFAC,HCNC,, | Performed by: FAMILY MEDICINE

## 2020-02-03 PROCEDURE — 99999 PR PBB SHADOW E&M-EST. PATIENT-LVL III: ICD-10-PCS | Mod: PBBFAC,HCNC,, | Performed by: FAMILY MEDICINE

## 2020-02-03 PROCEDURE — 1159F PR MEDICATION LIST DOCUMENTED IN MEDICAL RECORD: ICD-10-PCS | Mod: HCNC,S$GLB,, | Performed by: FAMILY MEDICINE

## 2020-02-03 PROCEDURE — 1159F MED LIST DOCD IN RCRD: CPT | Mod: HCNC,S$GLB,, | Performed by: FAMILY MEDICINE

## 2020-02-03 PROCEDURE — 1126F AMNT PAIN NOTED NONE PRSNT: CPT | Mod: HCNC,S$GLB,, | Performed by: FAMILY MEDICINE

## 2020-02-03 PROCEDURE — 99214 PR OFFICE/OUTPT VISIT, EST, LEVL IV, 30-39 MIN: ICD-10-PCS | Mod: HCNC,S$GLB,, | Performed by: FAMILY MEDICINE

## 2020-02-03 PROCEDURE — 1101F PT FALLS ASSESS-DOCD LE1/YR: CPT | Mod: HCNC,CPTII,S$GLB, | Performed by: FAMILY MEDICINE

## 2020-02-03 PROCEDURE — 99214 OFFICE O/P EST MOD 30 MIN: CPT | Mod: HCNC,S$GLB,, | Performed by: FAMILY MEDICINE

## 2020-02-03 PROCEDURE — 1126F PR PAIN SEVERITY QUANTIFIED, NO PAIN PRESENT: ICD-10-PCS | Mod: HCNC,S$GLB,, | Performed by: FAMILY MEDICINE

## 2020-02-03 PROCEDURE — 1101F PR PT FALLS ASSESS DOC 0-1 FALLS W/OUT INJ PAST YR: ICD-10-PCS | Mod: HCNC,CPTII,S$GLB, | Performed by: FAMILY MEDICINE

## 2020-02-03 RX ORDER — LOSARTAN POTASSIUM 50 MG/1
TABLET ORAL
COMMUNITY
Start: 2020-01-22 | End: 2020-08-05 | Stop reason: SDUPTHER

## 2020-02-03 NOTE — PROGRESS NOTES
Subjective:       Patient ID: Silke Dodd is a 89 y.o. female    Chief Complaint: Annual Exam (6mth fu)    HPI  Here today for interval evaluation  Has not been taking Lexapro.    She is seeing Cardiology to adjust her medications for hypertension with some slow improvement.     Review of Systems   Constitutional: Positive for unexpected weight change. Negative for activity change.   HENT: Negative for hearing loss, rhinorrhea and trouble swallowing.    Eyes: Negative for discharge and visual disturbance.   Respiratory: Negative for chest tightness and wheezing.    Cardiovascular: Negative for chest pain and palpitations.   Gastrointestinal: Negative for blood in stool, constipation, diarrhea (loose stools occasionally) and vomiting.   Endocrine: Negative for polydipsia and polyuria.   Genitourinary: Negative for difficulty urinating, dysuria, hematuria and menstrual problem.   Musculoskeletal: Negative for arthralgias, joint swelling and neck pain.   Neurological: Negative for weakness and headaches.   Psychiatric/Behavioral: Negative for confusion and dysphoric mood.        Objective:   Physical Exam   Constitutional: She appears well-developed and well-nourished.   HENT:   Head: Normocephalic and atraumatic.   Eyes: Pupils are equal, round, and reactive to light. Conjunctivae and EOM are normal. No scleral icterus.   Neck: Normal range of motion. Neck supple. No thyromegaly present.   Cardiovascular: Normal rate, regular rhythm and normal heart sounds. Exam reveals no gallop and no friction rub.   No murmur heard.  Pulmonary/Chest: Effort normal and breath sounds normal. No respiratory distress. She has no wheezes. She has no rales.   Lymphadenopathy:     She has no cervical adenopathy.   Vitals reviewed.       Assessment:       1. Essential hypertension  Comprehensive metabolic panel    Lipid panel   2. Peripheral vascular disease     3. Hypothyroidism, unspecified type  TSH        Plan:       Essential  hypertension with Peripheral vascular disease  -     Comprehensive metabolic panel; Future; Expected date: 02/03/2020  -     Lipid panel; Future; Expected date: 02/03/2020  - Continue current therapy  - Serial blood pressure monitoring  - Diet and exercise education.     Hypothyroidism, unspecified type  -     TSH; Future; Expected date: 02/03/2020  - Continue current therapy

## 2020-02-11 ENCOUNTER — TELEPHONE (OUTPATIENT)
Dept: FAMILY MEDICINE | Facility: CLINIC | Age: 85
End: 2020-02-11

## 2020-02-12 ENCOUNTER — PATIENT MESSAGE (OUTPATIENT)
Dept: ADMINISTRATIVE | Facility: OTHER | Age: 85
End: 2020-02-12

## 2020-02-27 ENCOUNTER — TELEPHONE (OUTPATIENT)
Dept: FAMILY MEDICINE | Facility: CLINIC | Age: 85
End: 2020-02-27

## 2020-02-27 NOTE — TELEPHONE ENCOUNTER
----- Message from Jeannette Morales sent at 2020 12:41 PM CST -----  Contact: Cassidy with [facility]  Type:  Pharmacy Calling to Clarify an RX    Name of Caller:  Cassidy  Pharmacy Name:  [facility]  Prescription Name:  levothyroxine (SYNTHROID) 75 MCG tablet  What do they need to clarify?:  Refill request  Best Call Back Number:  [phone]  Additional Information:  Cassidy states daughter called doctor earlier today. The prescription is  and the daughter has been in several times to get the medication. please call Cassidy. Thanks!

## 2020-02-27 NOTE — TELEPHONE ENCOUNTER
----- Message from Lalo Ayers sent at 2/27/2020 11:58 AM CST -----  Contact: Janneth  Type: Needs Medical Advice    Who Called:  Patient's daughter janneth  Symptoms (please be specific):   How long has patient had these symptoms:    Pharmacy name and phone #:  [facility] pharmacy  Best Call Back Number: [phone]  Additional Information: requesting a call back have question regarding medication levothyroxine

## 2020-02-28 ENCOUNTER — PATIENT MESSAGE (OUTPATIENT)
Dept: FAMILY MEDICINE | Facility: CLINIC | Age: 85
End: 2020-02-28

## 2020-02-28 RX ORDER — LEVOTHYROXINE SODIUM 75 UG/1
75 TABLET ORAL DAILY
Qty: 90 TABLET | Refills: 3 | OUTPATIENT
Start: 2020-02-28

## 2020-02-28 RX ORDER — LEVOTHYROXINE SODIUM 75 UG/1
TABLET ORAL
Qty: 90 TABLET | Refills: 1 | Status: SHIPPED | OUTPATIENT
Start: 2020-02-28 | End: 2020-08-05 | Stop reason: SDUPTHER

## 2020-02-28 NOTE — PROGRESS NOTES
Refill Authorization Note     is requesting a refill authorization.    Brief assessment and rationale for refill: APPROVE: prr                                         Comments:     Requested Prescriptions   Signed Prescriptions Disp Refills    levothyroxine (SYNTHROID) 75 MCG tablet 90 tablet 1     Sig: TAKE 1 TABLET BY MOUTH EVERY DAY       Endocrinology:  Hypothyroid Agents Failed - 2/27/2020  2:22 PM        Failed - Manual Review: FT4 is not required if last TSH is WNL.        Failed - T4 free within 1080 days     No results found for: EXTFREET4, T4FREE, FREET4, O5JXEZUNVBIT, T4FT4           Passed - Patient is at least 18 years old        Passed - Office visit in past 12 months or future 90 days.     Recent Outpatient Visits            3 weeks ago Essential hypertension    [facility] Ronnie Szymanski MD    2 months ago Idiopathic peripheral neuropathy    [place] - Podiatry Narayan Jonas DPM    5 months ago Idiopathic peripheral neuropathy    [place] - Podiatrjaden Jonas DPM    6 months ago Hypothyroidism, unspecified type    [facility] Ronnie Szymanski MD    7 months ago Urinary tract infection without hematuria, site unspecified    [place] - Urology GUILLE Bustos MD          Future Appointments              In 4 days [facility]    In 4 days LAB, [place] [facility]    In 2 weeks ANNUAL WELLNESS VISIT-NURSE PRACTITIONER, [place] [facility]    In 1 month DTyrese Bedolla MD [place] - [facility], [place]                Passed - TSH in normal range and within 360 days     TSH   Date Value Ref Range Status   08/07/2019 0.625 0.400 - 4.000 uIU/mL Final   06/20/2018 0.757 0.400 - 4.000 uIU/mL Final   02/10/2016 1.194 0.400 - 4.000 uIU/mL Final               Appointments  past 12m or future 3m with PCP    Date Provider   Last Visit   2/3/2020  Ronnie Szymanski MD   Next Visit   2/27/2020 Ronnie Szymanski MD   .  ED visits in past 90 days: 0       Note composed:3:39 PM 02/28/2020

## 2020-02-28 NOTE — TELEPHONE ENCOUNTER
Refill Authorization Note     is requesting a refill authorization.    Brief assessment and rationale for refill: QUICK DC: duplicate request                                         Comments:     Requested Prescriptions     Refused Prescriptions Disp Refills    levothyroxine (SYNTHROID) 75 MCG tablet 90 tablet 3     Sig: Take 1 tablet (75 mcg total) by mouth once daily.     Refused By: CARROL VILLALTA     Reason for Refusal: Request already responded to by other means (e.g. phone or fax)       Last Prescribed Info:   levothyroxine (SYNTHROID) 75 MCG tablet 90 tablet 1 2/28/2020     Sig: TAKE 1 TABLET BY MOUTH EVERY DAY    Sent to pharmacy as: levothyroxine (SYNTHROID) 75 MCG tablet    E-Prescribing Status: Receipt confirmed by pharmacy (2/28/2020  3:39 PM CST)    Ordering Encounter Report     Associated Reports   View Encounter               1

## 2020-03-02 ENCOUNTER — PATIENT OUTREACH (OUTPATIENT)
Dept: ADMINISTRATIVE | Facility: OTHER | Age: 85
End: 2020-03-02

## 2020-03-03 ENCOUNTER — OFFICE VISIT (OUTPATIENT)
Dept: PODIATRY | Facility: CLINIC | Age: 85
End: 2020-03-03
Payer: MEDICARE

## 2020-03-03 ENCOUNTER — LAB VISIT (OUTPATIENT)
Dept: LAB | Facility: HOSPITAL | Age: 85
End: 2020-03-03
Attending: FAMILY MEDICINE
Payer: MEDICARE

## 2020-03-03 VITALS — BODY MASS INDEX: 23.69 KG/M2 | WEIGHT: 138 LBS | RESPIRATION RATE: 18 BRPM

## 2020-03-03 DIAGNOSIS — M20.41 HAMMER TOES OF BOTH FEET: ICD-10-CM

## 2020-03-03 DIAGNOSIS — B35.1 ONYCHOMYCOSIS DUE TO DERMATOPHYTE: ICD-10-CM

## 2020-03-03 DIAGNOSIS — G60.9 IDIOPATHIC PERIPHERAL NEUROPATHY: Primary | ICD-10-CM

## 2020-03-03 DIAGNOSIS — M20.42 HAMMER TOES OF BOTH FEET: ICD-10-CM

## 2020-03-03 DIAGNOSIS — I73.9 PERIPHERAL VASCULAR DISEASE: ICD-10-CM

## 2020-03-03 DIAGNOSIS — I10 ESSENTIAL HYPERTENSION: ICD-10-CM

## 2020-03-03 DIAGNOSIS — E03.9 HYPOTHYROIDISM, UNSPECIFIED TYPE: ICD-10-CM

## 2020-03-03 LAB
ALBUMIN SERPL BCP-MCNC: 3.6 G/DL (ref 3.5–5.2)
ALP SERPL-CCNC: 71 U/L (ref 55–135)
ALT SERPL W/O P-5'-P-CCNC: 11 U/L (ref 10–44)
ANION GAP SERPL CALC-SCNC: 6 MMOL/L (ref 8–16)
AST SERPL-CCNC: 20 U/L (ref 10–40)
BILIRUB SERPL-MCNC: 0.9 MG/DL (ref 0.1–1)
BUN SERPL-MCNC: 11 MG/DL (ref 10–30)
CALCIUM SERPL-MCNC: 9.8 MG/DL (ref 8.7–10.5)
CHLORIDE SERPL-SCNC: 105 MMOL/L (ref 95–110)
CHOLEST SERPL-MCNC: 229 MG/DL (ref 120–199)
CHOLEST/HDLC SERPL: 3.3 {RATIO} (ref 2–5)
CO2 SERPL-SCNC: 31 MMOL/L (ref 23–29)
CREAT SERPL-MCNC: 0.9 MG/DL (ref 0.5–1.4)
EST. GFR  (AFRICAN AMERICAN): >60 ML/MIN/1.73 M^2
EST. GFR  (NON AFRICAN AMERICAN): 56.1 ML/MIN/1.73 M^2
GLUCOSE SERPL-MCNC: 88 MG/DL (ref 70–110)
HDLC SERPL-MCNC: 70 MG/DL (ref 40–75)
HDLC SERPL: 30.6 % (ref 20–50)
LDLC SERPL CALC-MCNC: 137.2 MG/DL (ref 63–159)
NONHDLC SERPL-MCNC: 159 MG/DL
POTASSIUM SERPL-SCNC: 4.4 MMOL/L (ref 3.5–5.1)
PROT SERPL-MCNC: 6.7 G/DL (ref 6–8.4)
SODIUM SERPL-SCNC: 142 MMOL/L (ref 136–145)
TRIGL SERPL-MCNC: 109 MG/DL (ref 30–150)
TSH SERPL DL<=0.005 MIU/L-ACNC: 0.74 UIU/ML (ref 0.4–4)

## 2020-03-03 PROCEDURE — 80053 COMPREHEN METABOLIC PANEL: CPT | Mod: HCNC

## 2020-03-03 PROCEDURE — 99213 OFFICE O/P EST LOW 20 MIN: CPT | Mod: 25,HCNC,S$GLB, | Performed by: PODIATRIST

## 2020-03-03 PROCEDURE — 1101F PT FALLS ASSESS-DOCD LE1/YR: CPT | Mod: HCNC,CPTII,S$GLB, | Performed by: PODIATRIST

## 2020-03-03 PROCEDURE — 1101F PR PT FALLS ASSESS DOC 0-1 FALLS W/OUT INJ PAST YR: ICD-10-PCS | Mod: HCNC,CPTII,S$GLB, | Performed by: PODIATRIST

## 2020-03-03 PROCEDURE — 1126F AMNT PAIN NOTED NONE PRSNT: CPT | Mod: HCNC,S$GLB,, | Performed by: PODIATRIST

## 2020-03-03 PROCEDURE — 11721 DEBRIDE NAIL 6 OR MORE: CPT | Mod: Q9,HCNC,S$GLB, | Performed by: PODIATRIST

## 2020-03-03 PROCEDURE — 1159F PR MEDICATION LIST DOCUMENTED IN MEDICAL RECORD: ICD-10-PCS | Mod: HCNC,S$GLB,, | Performed by: PODIATRIST

## 2020-03-03 PROCEDURE — 11721 PR DEBRIDEMENT OF NAILS, 6 OR MORE: ICD-10-PCS | Mod: Q9,HCNC,S$GLB, | Performed by: PODIATRIST

## 2020-03-03 PROCEDURE — 80061 LIPID PANEL: CPT | Mod: HCNC

## 2020-03-03 PROCEDURE — 1126F PR PAIN SEVERITY QUANTIFIED, NO PAIN PRESENT: ICD-10-PCS | Mod: HCNC,S$GLB,, | Performed by: PODIATRIST

## 2020-03-03 PROCEDURE — 84443 ASSAY THYROID STIM HORMONE: CPT | Mod: HCNC

## 2020-03-03 PROCEDURE — 1159F MED LIST DOCD IN RCRD: CPT | Mod: HCNC,S$GLB,, | Performed by: PODIATRIST

## 2020-03-03 PROCEDURE — 36415 COLL VENOUS BLD VENIPUNCTURE: CPT | Mod: HCNC,PO

## 2020-03-03 PROCEDURE — 99999 PR PBB SHADOW E&M-EST. PATIENT-LVL III: ICD-10-PCS | Mod: PBBFAC,HCNC,, | Performed by: PODIATRIST

## 2020-03-03 PROCEDURE — 99213 PR OFFICE/OUTPT VISIT, EST, LEVL III, 20-29 MIN: ICD-10-PCS | Mod: 25,HCNC,S$GLB, | Performed by: PODIATRIST

## 2020-03-03 PROCEDURE — 99999 PR PBB SHADOW E&M-EST. PATIENT-LVL III: CPT | Mod: PBBFAC,HCNC,, | Performed by: PODIATRIST

## 2020-03-03 NOTE — PROGRESS NOTES
Subjective:      Patient ID: Silke Dodd is a 89 y.o. female.    Chief Complaint: Follow-up (3 month follow up IPN (PCP: Stephon seen 2/3/2020))    Silke is a 89 y.o. female who presents to the clinic for evaluation and treatment of high risk feet. 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 The patient's chief complaint consists of toenails in need of trimming.  Denies being painful with wearing shoe gear.  Has not attempted to trim on her own.  Continues applying vicks vapor rub to toenails daily to address fungal infection.  Denies any additional pedal complaints.       PCP: Ronnie Szymanski MD    Date Last Seen by PCP: 2/20    No results found for: HGBA1C      Past Medical History:   Diagnosis Date    Acute angle-closure glaucoma of right eye     Breast cancer 2015    Cancer     BREAST, UTURINE    Corns and callosities     Depression     Hypertension     Hypothyroid     Osteoporosis, post-menopausal 4/23/2013    Ovarian cancer     Panic disorder        Past Surgical History:   Procedure Laterality Date    CATARACT EXTRACTION Bilateral     corrected for monovision    HYSTERECTOMY  1975    Cancer cells    MASTECTOMY Right 2015    TOTAL ABDOMINAL HYSTERECTOMY W/ BILATERAL SALPINGOOPHORECTOMY  1975       Family History   Problem Relation Age of Onset    Cancer Mother     Breast cancer Mother     Diabetes Father     Stroke Father     Coronary artery disease Father        Social History     Socioeconomic History    Marital status:      Spouse name: Not on file    Number of children: 3    Years of education: Not on file    Highest education level: Not on file   Occupational History    Not on file   Social Needs    Financial resource strain: Somewhat hard    Food insecurity:     Worry:  Never true     Inability: Never true    Transportation needs:     Medical: No     Non-medical: No   Tobacco Use    Smoking status: Never Smoker    Smokeless tobacco: Never Used   Substance and Sexual Activity    Alcohol use: No     Frequency: 4 or more times a week     Drinks per session: 1 or 2     Binge frequency: Never    Drug use: No    Sexual activity: Never     Birth control/protection: Surgical   Lifestyle    Physical activity:     Days per week: 5 days     Minutes per session: 60 min    Stress: Not on file   Relationships    Social connections:     Talks on phone: More than three times a week     Gets together: Once a week     Attends Quaker service: Not on file     Active member of club or organization: Yes     Attends meetings of clubs or organizations: More than 4 times per year     Relationship status:    Other Topics Concern    Patient feels they ought to cut down on drinking/drug use Not Asked    Patient annoyed by others criticizing their drinking/drug use Not Asked    Patient has felt bad or guilty about drinking/drug use Not Asked    Patient has had a drink/used drugs as an eye opener in the AM Not Asked   Social History Narrative    Not on file       Current Outpatient Medications   Medication Sig Dispense Refill    acetaminophen (TYLENOL) 325 MG tablet Take 325 mg by mouth every 6 (six) hours as needed for Pain.      aspirin (ECOTRIN) 81 MG EC tablet Take 81 mg by mouth Daily.      cholecalciferol, vitamin D3, (VITAMIN D3) 5,000 unit Tab Take 5,000 Units by mouth once daily.      escitalopram oxalate (LEXAPRO) 20 MG tablet Take 1 tablet (20 mg total) by mouth once daily. 90 tablet 3    guaiFENesin (MUCINEX) 600 mg 12 hr tablet Take 1,200 mg by mouth 2 (two) times daily.      levothyroxine (SYNTHROID) 75 MCG tablet TAKE 1 TABLET BY MOUTH EVERY DAY 90 tablet 1    losartan (COZAAR) 50 MG tablet       metoprolol succinate (TOPROL-XL) 25 MG 24 hr tablet 2 (two) times  daily.       varicella-zoster gE-AS01B, PF, (SHINGRIX, PF,) 50 mcg/0.5 mL injection Inject 0.5 mLs into the muscle. 0.5 mL 1    ranitidine (ZANTAC) 150 MG tablet Take 1 tablet (150 mg total) by mouth 2 (two) times daily. (Patient not taking: Reported on 2/3/2020) 180 tablet 3     No current facility-administered medications for this visit.        Review of patient's allergies indicates:  No Known Allergies      Review of Systems   Constitution: Negative for chills.   Cardiovascular: Negative for claudication and leg swelling.   Skin: Positive for color change and nail changes.   Musculoskeletal: Negative for joint pain and joint swelling.   Neurological: Positive for numbness. Negative for paresthesias.   Psychiatric/Behavioral: Negative for altered mental status.           Objective:      Physical Exam   Constitutional: She is oriented to person, place, and time. She appears well-developed and well-nourished. No distress.   Cardiovascular:   Pulses:       Dorsalis pedis pulses are 1+ on the right side, and 1+ on the left side.        Posterior tibial pulses are 1+ on the right side, and 1+ on the left side.   CFT 3 seconds bilateral.  Pedal hair growth decreased bilateral.   Mild varicosities noted bilateral.  Mild nonpitting edema noted to bilateral lower extremity.  Toes are cool to touch bilateral.     Musculoskeletal: She exhibits edema. She exhibits no tenderness.   Muscle strength 5/5 in all muscle groups bilateral.  No tenderness nor crepitation with ROM of foot/ankle joints bilateral.  No pain with palpation to bilateral foot and ankle.  Bilateral hallux abducto valgus.  Bilateral semi-rigid contracture of toes 2-5.       Neurological: She is alert and oriented to person, place, and time. She has normal strength. A sensory deficit is present.   Protective sensation per [place] monofilament is absent bilateral.  Vibratory sensation is absent bilateral.  Light touch is absent bilateral.   Skin:  Skin is warm, dry and intact. Capillary refill takes less than 2 seconds. No abrasion, no bruising, no burn, no ecchymosis, no laceration, no lesion, no petechiae and no rash noted. She is not diaphoretic. No erythema. No pallor.   Pedal skin appears thin bilateral.  Toenails x 10 appear thickened by 2 mm's, elongated by 4 mm's and discolored with subungual debris.  Examination of the skin reveals no evidence of significant maceration, rashes, open lesions, suspicious appearing nevi or other concerning lesions.              Assessment:       Encounter Diagnoses   Name Primary?    Idiopathic peripheral neuropathy Yes    Peripheral vascular disease     Hammer toes of both feet     Onychomycosis due to dermatophyte          Plan:       Silke was seen today for follow-up.    Diagnoses and all orders for this visit:    Idiopathic peripheral neuropathy    Peripheral vascular disease    Hammer toes of both feet    Onychomycosis due to dermatophyte      I counseled the patient on her conditions, their implications and medical management.    Idiopathic neuropathy is present on exam, however, she continues to deny associated pain symptoms or changes in gait.    PVD continues to remain stable with today's exam, will continue to monitor.    Patient advised to continue wearing shoe gear that accommodates for digital deformities.      With patient's permission, nails were aggressively reduced and debrided x 10 to their soft tissue attachment mechanically and with electric , removing all offending nail and debris. Patient relates relief following the procedure. She will continue to monitor the areas daily, inspect her feet, wear protective shoe gear when ambulatory, moisturizer to maintain skin integrity and follow in this office in approximately 2-3 months, sooner p.r.n.    Follow up in about 3 months (around 6/3/2020).    Narayan Jonas DPM

## 2020-03-16 ENCOUNTER — PATIENT MESSAGE (OUTPATIENT)
Dept: OPHTHALMOLOGY | Facility: CLINIC | Age: 85
End: 2020-03-16

## 2020-04-30 ENCOUNTER — TELEPHONE (OUTPATIENT)
Dept: CARDIOLOGY | Facility: CLINIC | Age: 85
End: 2020-04-30

## 2020-04-30 NOTE — TELEPHONE ENCOUNTER
----- Message from Anyi Dorsey sent at 4/30/2020  3:00 PM CDT -----  Contact: daughter  Type: Needs Medical Advice  Who Called:  Janneth  Symptoms (please be specific):    How long has patient had these symptoms:    Pharmacy name and phone #:    Best Call Back Number:   Additional Information: pt's daughter is requesting a form to release medical records to sign so the provider can obtain her mother's medical records prior to initial visit her visit. Will you pls mail the form to [address] [place] [address]. pls call to advise

## 2020-05-06 ENCOUNTER — PATIENT MESSAGE (OUTPATIENT)
Dept: ADMINISTRATIVE | Facility: HOSPITAL | Age: 85
End: 2020-05-06

## 2020-05-07 ENCOUNTER — PATIENT MESSAGE (OUTPATIENT)
Dept: PODIATRY | Facility: CLINIC | Age: 85
End: 2020-05-07

## 2020-05-25 ENCOUNTER — HOSPITAL ENCOUNTER (OUTPATIENT)
Dept: RADIOLOGY | Facility: HOSPITAL | Age: 85
Discharge: HOME | End: 2020-05-25
Attending: NURSE PRACTITIONER
Payer: MEDICARE

## 2020-05-25 ENCOUNTER — TELEPHONE (OUTPATIENT)
Dept: FAMILY MEDICINE | Facility: CLINIC | Age: 85
End: 2020-05-25

## 2020-05-25 ENCOUNTER — OFFICE VISIT (OUTPATIENT)
Dept: FAMILY MEDICINE | Facility: CLINIC | Age: 85
End: 2020-05-25
Payer: MEDICARE

## 2020-05-25 VITALS
HEIGHT: 64 IN | OXYGEN SATURATION: 96 % | WEIGHT: 134.94 LBS | HEART RATE: 65 BPM | SYSTOLIC BLOOD PRESSURE: 136 MMHG | DIASTOLIC BLOOD PRESSURE: 70 MMHG | TEMPERATURE: 98 F | BODY MASS INDEX: 23.04 KG/M2

## 2020-05-25 DIAGNOSIS — M25.512 ACUTE PAIN OF LEFT SHOULDER: ICD-10-CM

## 2020-05-25 DIAGNOSIS — M25.552 ACUTE PAIN OF LEFT HIP: ICD-10-CM

## 2020-05-25 DIAGNOSIS — M25.552 ACUTE PAIN OF LEFT HIP: Primary | ICD-10-CM

## 2020-05-25 PROCEDURE — 1159F PR MEDICATION LIST DOCUMENTED IN MEDICAL RECORD: ICD-10-PCS | Mod: HCNC,S$GLB,, | Performed by: NURSE PRACTITIONER

## 2020-05-25 PROCEDURE — 73521 X-RAY EXAM HIPS BI 2 VIEWS: CPT | Mod: 26,HCNC,, | Performed by: RADIOLOGY

## 2020-05-25 PROCEDURE — 99999 PR PBB SHADOW E&M-EST. PATIENT-LVL IV: CPT | Mod: PBBFAC,HCNC,, | Performed by: NURSE PRACTITIONER

## 2020-05-25 PROCEDURE — 73030 X-RAY EXAM OF SHOULDER: CPT | Mod: TC,HCNC,FY,PO,LT

## 2020-05-25 PROCEDURE — 73521 X-RAY EXAM HIPS BI 2 VIEWS: CPT | Mod: TC,HCNC,FY,PO

## 2020-05-25 PROCEDURE — 99214 PR OFFICE/OUTPT VISIT, EST, LEVL IV, 30-39 MIN: ICD-10-PCS | Mod: HCNC,S$GLB,, | Performed by: NURSE PRACTITIONER

## 2020-05-25 PROCEDURE — 73030 XR SHOULDER COMPLETE 2 OR MORE VIEWS LEFT: ICD-10-PCS | Mod: 26,HCNC,LT, | Performed by: RADIOLOGY

## 2020-05-25 PROCEDURE — 99214 OFFICE O/P EST MOD 30 MIN: CPT | Mod: HCNC,S$GLB,, | Performed by: NURSE PRACTITIONER

## 2020-05-25 PROCEDURE — 1101F PR PT FALLS ASSESS DOC 0-1 FALLS W/OUT INJ PAST YR: ICD-10-PCS | Mod: HCNC,CPTII,S$GLB, | Performed by: NURSE PRACTITIONER

## 2020-05-25 PROCEDURE — 73521 XR HIPS BILATERAL 2 VIEW INCL AP PELVIS: ICD-10-PCS | Mod: 26,HCNC,, | Performed by: RADIOLOGY

## 2020-05-25 PROCEDURE — 1125F AMNT PAIN NOTED PAIN PRSNT: CPT | Mod: HCNC,S$GLB,, | Performed by: NURSE PRACTITIONER

## 2020-05-25 PROCEDURE — 73030 X-RAY EXAM OF SHOULDER: CPT | Mod: 26,HCNC,LT, | Performed by: RADIOLOGY

## 2020-05-25 PROCEDURE — 1125F PR PAIN SEVERITY QUANTIFIED, PAIN PRESENT: ICD-10-PCS | Mod: HCNC,S$GLB,, | Performed by: NURSE PRACTITIONER

## 2020-05-25 PROCEDURE — 99999 PR PBB SHADOW E&M-EST. PATIENT-LVL IV: ICD-10-PCS | Mod: PBBFAC,HCNC,, | Performed by: NURSE PRACTITIONER

## 2020-05-25 PROCEDURE — 1159F MED LIST DOCD IN RCRD: CPT | Mod: HCNC,S$GLB,, | Performed by: NURSE PRACTITIONER

## 2020-05-25 PROCEDURE — 1101F PT FALLS ASSESS-DOCD LE1/YR: CPT | Mod: HCNC,CPTII,S$GLB, | Performed by: NURSE PRACTITIONER

## 2020-05-25 RX ORDER — PREDNISONE 20 MG/1
TABLET ORAL
Qty: 18 TABLET | Refills: 0 | Status: SHIPPED | OUTPATIENT
Start: 2020-05-25 | End: 2020-07-06

## 2020-05-25 NOTE — TELEPHONE ENCOUNTER
----- Message from Daniel Zapata sent at 5/25/2020  6:59 AM CDT -----  Contact: amaris Lagunas [phone]  Type:  Same Day Appointment Request    Caller is requesting a same day appointment.  Caller declined first available appointment listed below.      Name of Caller:  amaris Lagunas [phone]    When is the first available appointment?  Requesting today.    Symptoms:  Pain from hip to foot.    Best Call Back Number:  amaris Lagunas [phone]    Additional Information:   Advised needs to see the Dr today for her condition, has been this way for the past two weeks.

## 2020-05-25 NOTE — PATIENT INSTRUCTIONS
Have xray series completed   Continue with physical therapy  Continue with over the counter tylenol for pain and discomfort  Will follow up with xray results.

## 2020-05-25 NOTE — PROGRESS NOTES
"Subjective:       Patient ID: Silke Dodd is a 89 y.o. female.    Chief Complaint: Establish Care and Hip Pain (left side)    Ms. Edouard is a new patient to me.  She is here for left hip pain and left arm pain.  She is with her daughter today. She lived in assisted living facility prior to covid-19, but has been living with her daughter for 8 weeks.  Her daughter has recalled that she has complained of left hip pain with radiation down her left leg.  She is taking tylenol for the pain.  She also denies trauma and injury to the area.  She describes the pain as "burning." Today is the first day she has felt relief from the pain.  She rates it at the height of pain at 8/10.  She has not applied ice to the area.  She does walk the neighborhood with her daughter and tried to rest and elevate it at night.    Vitals:    05/25/20 1348   BP: 136/70   Pulse: 65   Temp: 98.2 °F (36.8 °C)     Review of Systems   Constitutional: Negative for activity change, appetite change, chills, fatigue and fever.   HENT: Negative for congestion, ear pain, postnasal drip, rhinorrhea, sinus pressure, sinus pain, sneezing, sore throat and trouble swallowing.    Eyes: Negative for pain, redness and visual disturbance.   Respiratory: Negative for cough, choking, chest tightness, shortness of breath and wheezing.    Cardiovascular: Negative for chest pain, palpitations and leg swelling.   Gastrointestinal: Negative for abdominal pain, blood in stool, constipation, diarrhea, nausea and vomiting.   Endocrine: Negative.    Genitourinary: Negative for decreased urine volume, difficulty urinating, dysuria, flank pain, hematuria and urgency.   Musculoskeletal: Positive for arthralgias, back pain, gait problem and myalgias. Negative for neck pain.   Skin: Negative for color change, pallor and rash.   Allergic/Immunologic: Negative.    Neurological: Negative for dizziness, speech difficulty, weakness, light-headedness, numbness and headaches. "   Hematological: Does not bruise/bleed easily.   Psychiatric/Behavioral: Negative for self-injury and suicidal ideas. The patient is not nervous/anxious.        Past Medical History:   Diagnosis Date    Acute angle-closure glaucoma of right eye     Breast cancer 2015    Cancer     BREAST, UTURINE    Corns and callosities     Depression     Hypertension     Hypothyroid     Osteoporosis, post-menopausal 4/23/2013    Ovarian cancer     Panic disorder        Objective:      Physical Exam   Constitutional: She is oriented to person, place, and time. Vital signs are normal. She appears well-developed and well-nourished. She is active.  Non-toxic appearance. She does not have a sickly appearance. She does not appear ill. No distress.   HENT:   Head: Normocephalic and atraumatic. Head is without right periorbital erythema and without left periorbital erythema.   Right Ear: Hearing and external ear normal.   Left Ear: Hearing and external ear normal.   Nose: Nose normal. No mucosal edema or rhinorrhea. Right sinus exhibits no maxillary sinus tenderness and no frontal sinus tenderness. Left sinus exhibits no maxillary sinus tenderness and no frontal sinus tenderness.   Mouth/Throat: Uvula is midline, oropharynx is clear and moist and mucous membranes are normal.   Eyes: Pupils are equal, round, and reactive to light. Conjunctivae, EOM and lids are normal.   Cardiovascular: Normal rate, regular rhythm, S1 normal, S2 normal, normal heart sounds, intact distal pulses and normal pulses.   Pulmonary/Chest: Effort normal and breath sounds normal. No accessory muscle usage or stridor. No apnea, no tachypnea and no bradypnea. No respiratory distress. She has no decreased breath sounds. She has no wheezes. She has no rhonchi. She has no rales.   Abdominal: Soft. Normal appearance and bowel sounds are normal. There is no tenderness.   Musculoskeletal:        Left hip: She exhibits decreased range of motion, decreased strength  and tenderness. She exhibits no swelling.        Left upper leg: She exhibits tenderness. She exhibits no swelling.        Left lower leg: She exhibits tenderness. She exhibits no swelling.        Legs:  Neurological: She is alert and oriented to person, place, and time.   Skin: Skin is warm and intact. Capillary refill takes less than 2 seconds. No bruising, no burn, no laceration, no lesion, no petechiae and no rash noted. She is not diaphoretic. No erythema.   Psychiatric: She has a normal mood and affect. Judgment and thought content normal. Her speech is delayed. She is slowed. Cognition and memory are impaired.   Nursing note and vitals reviewed.      Assessment:       1. Acute pain of left hip    2. Acute pain of left shoulder        Plan:       Acute pain of left hip  -     X-Ray Hips Bilateral 2 View Incl AP Pelvis; Future; Expected date: 05/25/2020  -     predniSONE (DELTASONE) 20 MG tablet; Take 3 tablets daily for 3 days, then 2 tablets daily for 3 days, then 1 tablet daily for 3 days  Dispense: 18 tablet; Refill: 0  -     Ambulatory referral/consult to Orthopedics; Future; Expected date: 06/01/2020    Acute pain of left shoulder  -     X-Ray Shoulder 2 or More Views Left; Future; Expected date: 05/25/2020    Continue with weekly visits for Physical therapy twice a week to help eliminate tenderness, pain and discomfort.   Please schedule visit with orthopedic for evaluation and treatment of pain and discomfort of left arm and left hip.    Follow up in about 1 week (around 6/1/2020), or if symptoms worsen or fail to improve.

## 2020-05-26 ENCOUNTER — TELEPHONE (OUTPATIENT)
Dept: ORTHOPEDICS | Facility: CLINIC | Age: 85
End: 2020-05-26

## 2020-05-31 ENCOUNTER — PATIENT OUTREACH (OUTPATIENT)
Dept: ADMINISTRATIVE | Facility: OTHER | Age: 85
End: 2020-05-31

## 2020-06-03 ENCOUNTER — OFFICE VISIT (OUTPATIENT)
Dept: PODIATRY | Facility: CLINIC | Age: 85
End: 2020-06-03
Payer: MEDICARE

## 2020-06-03 VITALS — BODY MASS INDEX: 23.04 KG/M2 | WEIGHT: 134.94 LBS | HEIGHT: 64 IN

## 2020-06-03 DIAGNOSIS — G60.9 IDIOPATHIC PERIPHERAL NEUROPATHY: Primary | ICD-10-CM

## 2020-06-03 DIAGNOSIS — B35.1 ONYCHOMYCOSIS DUE TO DERMATOPHYTE: ICD-10-CM

## 2020-06-03 DIAGNOSIS — M20.42 HAMMER TOES OF BOTH FEET: ICD-10-CM

## 2020-06-03 DIAGNOSIS — I73.9 PERIPHERAL VASCULAR DISEASE: ICD-10-CM

## 2020-06-03 DIAGNOSIS — M20.41 HAMMER TOES OF BOTH FEET: ICD-10-CM

## 2020-06-03 PROCEDURE — 99999 PR PBB SHADOW E&M-EST. PATIENT-LVL II: ICD-10-PCS | Mod: PBBFAC,HCNC,, | Performed by: PODIATRIST

## 2020-06-03 PROCEDURE — 99499 UNLISTED E&M SERVICE: CPT | Mod: HCNC,S$GLB,, | Performed by: PODIATRIST

## 2020-06-03 PROCEDURE — 11721 PR DEBRIDEMENT OF NAILS, 6 OR MORE: ICD-10-PCS | Mod: Q9,HCNC,S$GLB, | Performed by: PODIATRIST

## 2020-06-03 PROCEDURE — 99499 NO LOS: ICD-10-PCS | Mod: HCNC,S$GLB,, | Performed by: PODIATRIST

## 2020-06-03 PROCEDURE — 99999 PR PBB SHADOW E&M-EST. PATIENT-LVL II: CPT | Mod: PBBFAC,HCNC,, | Performed by: PODIATRIST

## 2020-06-03 PROCEDURE — 11721 DEBRIDE NAIL 6 OR MORE: CPT | Mod: Q9,HCNC,S$GLB, | Performed by: PODIATRIST

## 2020-06-03 NOTE — PROGRESS NOTES
Subjective:      Patient ID: Silke Dodd is a 89 y.o. female.    Chief Complaint: No chief complaint on file.    Silke is a 89 y.o. female who presents to the clinic for evaluation and treatment of high risk feet. 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 The patient's chief complaint consists of toenails in need of trimming.  Denies being painful with wearing shoe gear.  Has not attempted to trim on her own.  Notes an increase in neuropathy pain to the Lt. Lower extremity.  Symptoms occur throughout the day but intensify at night.  Notes symptoms coincide with existing arthritis of the hip.  Inquires as to potential options for pain relief.   Denies any additional pedal complaints.       PCP: Ronnie Szymanski MD    Date Last Seen by PCP: 2/20    No results found for: HGBA1C      Past Medical History:   Diagnosis Date    Acute angle-closure glaucoma of right eye     Breast cancer 2015    Cancer     BREAST, UTURINE    Corns and callosities     Depression     Hypertension     Hypothyroid     Osteoporosis, post-menopausal 4/23/2013    Ovarian cancer     Panic disorder        Past Surgical History:   Procedure Laterality Date    CATARACT EXTRACTION Bilateral     corrected for monovision    HYSTERECTOMY  1975    Cancer cells    MASTECTOMY Right 2015    TOTAL ABDOMINAL HYSTERECTOMY W/ BILATERAL SALPINGOOPHORECTOMY  1975       Family History   Problem Relation Age of Onset    Cancer Mother     Breast cancer Mother     Diabetes Father     Stroke Father     Coronary artery disease Father        Social History     Socioeconomic History    Marital status:      Spouse name: Not on file    Number of children: 3    Years of education: Not on file    Highest education level: Not on file    Occupational History    Not on file   Social Needs    Financial resource strain: Somewhat hard    Food insecurity:     Worry: Never true     Inability: Never true    Transportation needs:     Medical: No     Non-medical: No   Tobacco Use    Smoking status: Never Smoker    Smokeless tobacco: Never Used   Substance and Sexual Activity    Alcohol use: No     Frequency: 4 or more times a week     Drinks per session: 1 or 2     Binge frequency: Never    Drug use: No    Sexual activity: Never     Birth control/protection: Surgical   Lifestyle    Physical activity:     Days per week: 5 days     Minutes per session: 60 min    Stress: Not on file   Relationships    Social connections:     Talks on phone: More than three times a week     Gets together: Once a week     Attends Druze service: Not on file     Active member of club or organization: Yes     Attends meetings of clubs or organizations: More than 4 times per year     Relationship status:    Other Topics Concern    Patient feels they ought to cut down on drinking/drug use Not Asked    Patient annoyed by others criticizing their drinking/drug use Not Asked    Patient has felt bad or guilty about drinking/drug use Not Asked    Patient has had a drink/used drugs as an eye opener in the AM Not Asked   Social History Narrative    Not on file       Current Outpatient Medications   Medication Sig Dispense Refill    acetaminophen (TYLENOL) 325 MG tablet Take 325 mg by mouth every 6 (six) hours as needed for Pain.      aspirin (ECOTRIN) 81 MG EC tablet Take 81 mg by mouth Daily.      cholecalciferol, vitamin D3, (VITAMIN D3) 5,000 unit Tab Take 5,000 Units by mouth once daily.      escitalopram oxalate (LEXAPRO) 20 MG tablet Take 1 tablet (20 mg total) by mouth once daily. 90 tablet 3    guaiFENesin (MUCINEX) 600 mg 12 hr tablet Take 1,200 mg by mouth 2 (two) times daily.      levothyroxine (SYNTHROID) 75 MCG tablet TAKE 1 TABLET BY MOUTH  EVERY DAY 90 tablet 1    losartan (COZAAR) 50 MG tablet       metoprolol succinate (TOPROL-XL) 25 MG 24 hr tablet 2 (two) times daily.       predniSONE (DELTASONE) 20 MG tablet Take 3 tablets daily for 3 days, then 2 tablets daily for 3 days, then 1 tablet daily for 3 days 18 tablet 0    ranitidine (ZANTAC) 150 MG tablet Take 1 tablet (150 mg total) by mouth 2 (two) times daily. (Patient not taking: Reported on 2/3/2020) 180 tablet 3    varicella-zoster gE-AS01B, PF, (SHINGRIX, PF,) 50 mcg/0.5 mL injection Inject 0.5 mLs into the muscle. 0.5 mL 1     No current facility-administered medications for this visit.        Review of patient's allergies indicates:  No Known Allergies      Review of Systems   Constitution: Negative for chills.   Cardiovascular: Negative for claudication and leg swelling.   Skin: Positive for color change and nail changes.   Musculoskeletal: Negative for joint pain and joint swelling.   Neurological: Positive for numbness and paresthesias.   Psychiatric/Behavioral: Negative for altered mental status.           Objective:      Physical Exam   Constitutional: She is oriented to person, place, and time. She appears well-developed and well-nourished. No distress.   Cardiovascular:   Pulses:       Dorsalis pedis pulses are 1+ on the right side, and 1+ on the left side.        Posterior tibial pulses are 1+ on the right side, and 1+ on the left side.   CFT 3 seconds bilateral.  Pedal hair growth decreased bilateral.   Mild varicosities noted bilateral.  Mild nonpitting edema noted to bilateral lower extremity.  Toes are cool to touch bilateral.     Musculoskeletal: She exhibits edema. She exhibits no tenderness.   Muscle strength 5/5 in all muscle groups bilateral.  No tenderness nor crepitation with ROM of foot/ankle joints bilateral.  No pain with palpation to bilateral foot and ankle.  Bilateral hallux abducto valgus.  Bilateral semi-rigid contracture of toes 2-5.       Neurological: She  is alert and oriented to person, place, and time. She has normal strength. A sensory deficit is present.   Protective sensation per [place] monofilament is absent bilateral.  Vibratory sensation is absent bilateral.  Light touch is absent bilateral.   Skin: Skin is warm, dry and intact. Capillary refill takes less than 2 seconds. No abrasion, no bruising, no burn, no ecchymosis, no laceration, no lesion, no petechiae and no rash noted. She is not diaphoretic. No erythema. No pallor.   Pedal skin appears thin bilateral.  Toenails x 10 appear thickened by 2 mm's, elongated by 6 mm's and discolored with subungual debris.  Examination of the skin reveals no evidence of significant maceration, rashes, open lesions, suspicious appearing nevi or other concerning lesions.              Assessment:       Encounter Diagnoses   Name Primary?    Idiopathic peripheral neuropathy Yes    Peripheral vascular disease     Hammer toes of both feet     Onychomycosis due to dermatophyte          Plan:       Diagnoses and all orders for this visit:    Idiopathic peripheral neuropathy    Peripheral vascular disease    Hammer toes of both feet    Onychomycosis due to dermatophyte      I counseled the patient on her conditions, their implications and medical management.    Discussed purchasing OTC voltaren cream to help address Lt. Sided paresthesias.    PVD continues to remain stable with today's exam, will continue to monitor.    Patient advised to continue wearing shoe gear that accommodates for digital deformities.      With patient's permission, nails were aggressively reduced and debrided x 10 to their soft tissue attachment mechanically and with electric , removing all offending nail and debris. Patient relates relief following the procedure. She will continue to monitor the areas daily, inspect her feet, wear protective shoe gear when ambulatory, moisturizer to maintain skin integrity and follow in this office in  approximately 2-3 months, sooner p.r.n.    Follow up in about 3 months (around 9/3/2020).    Narayan Jonas DPM

## 2020-06-04 ENCOUNTER — OFFICE VISIT (OUTPATIENT)
Dept: ORTHOPEDICS | Facility: CLINIC | Age: 85
End: 2020-06-04
Payer: MEDICARE

## 2020-06-04 VITALS
BODY MASS INDEX: 22.88 KG/M2 | TEMPERATURE: 99 F | DIASTOLIC BLOOD PRESSURE: 63 MMHG | WEIGHT: 134 LBS | HEART RATE: 61 BPM | HEIGHT: 64 IN | SYSTOLIC BLOOD PRESSURE: 132 MMHG

## 2020-06-04 DIAGNOSIS — M70.62 GREATER TROCHANTERIC BURSITIS OF LEFT HIP: Primary | ICD-10-CM

## 2020-06-04 PROCEDURE — 1125F PR PAIN SEVERITY QUANTIFIED, PAIN PRESENT: ICD-10-PCS | Mod: HCNC,S$GLB,, | Performed by: ORTHOPAEDIC SURGERY

## 2020-06-04 PROCEDURE — 1101F PT FALLS ASSESS-DOCD LE1/YR: CPT | Mod: HCNC,CPTII,S$GLB, | Performed by: ORTHOPAEDIC SURGERY

## 2020-06-04 PROCEDURE — 99204 OFFICE O/P NEW MOD 45 MIN: CPT | Mod: HCNC,25,S$GLB, | Performed by: ORTHOPAEDIC SURGERY

## 2020-06-04 PROCEDURE — 20610 LARGE JOINT ASPIRATION/INJECTION: L GREATER TROCHANTERIC BURSA: ICD-10-PCS | Mod: HCNC,LT,S$GLB, | Performed by: ORTHOPAEDIC SURGERY

## 2020-06-04 PROCEDURE — 99999 PR PBB SHADOW E&M-EST. PATIENT-LVL IV: CPT | Mod: PBBFAC,HCNC,, | Performed by: ORTHOPAEDIC SURGERY

## 2020-06-04 PROCEDURE — 1125F AMNT PAIN NOTED PAIN PRSNT: CPT | Mod: HCNC,S$GLB,, | Performed by: ORTHOPAEDIC SURGERY

## 2020-06-04 PROCEDURE — 1159F MED LIST DOCD IN RCRD: CPT | Mod: HCNC,S$GLB,, | Performed by: ORTHOPAEDIC SURGERY

## 2020-06-04 PROCEDURE — 1101F PR PT FALLS ASSESS DOC 0-1 FALLS W/OUT INJ PAST YR: ICD-10-PCS | Mod: HCNC,CPTII,S$GLB, | Performed by: ORTHOPAEDIC SURGERY

## 2020-06-04 PROCEDURE — 20610 DRAIN/INJ JOINT/BURSA W/O US: CPT | Mod: HCNC,LT,S$GLB, | Performed by: ORTHOPAEDIC SURGERY

## 2020-06-04 PROCEDURE — 99204 PR OFFICE/OUTPT VISIT, NEW, LEVL IV, 45-59 MIN: ICD-10-PCS | Mod: HCNC,25,S$GLB, | Performed by: ORTHOPAEDIC SURGERY

## 2020-06-04 PROCEDURE — 99999 PR PBB SHADOW E&M-EST. PATIENT-LVL IV: ICD-10-PCS | Mod: PBBFAC,HCNC,, | Performed by: ORTHOPAEDIC SURGERY

## 2020-06-04 PROCEDURE — 1159F PR MEDICATION LIST DOCUMENTED IN MEDICAL RECORD: ICD-10-PCS | Mod: HCNC,S$GLB,, | Performed by: ORTHOPAEDIC SURGERY

## 2020-06-04 RX ADMIN — TRIAMCINOLONE ACETONIDE 40 MG: 40 INJECTION, SUSPENSION INTRA-ARTICULAR; INTRAMUSCULAR at 01:06

## 2020-06-04 NOTE — LETTER
June 30, 2020      Nataliya Dong NP  [address] [place] [address] [address]ena BARILLAS [address]           [facility]  [address] [place] [address]  Phone: [phone]          Patient: Silke Dodd   MR Number: [id number]   YOB: 1928   Date of Visit: 6/4/2020       Dear Nataliya Dong:    Thank you for referring Silke Dodd to me for evaluation. Attached you will find relevant portions of my assessment and plan of care.    If you have questions, please do not hesitate to call me. I look forward to following Silke Dodd along with you.    Sincerely,    Syed Jauregui MD    Enclosure  CC:  No Recipients    If you would like to receive this communication electronically, please contact [email or web address] or [phone] to request more information on [employer] access.    For providers and/or their staff who would like to refer a patient to [facility], please contact us through our one-stop-shop provider referral line, Dipesh Salcedo, at [phone].    If you feel you have received this communication in error or would no longer like to receive these types of communications, please e-mail [email or web address]

## 2020-06-04 NOTE — PROCEDURES
Large Joint Aspiration/Injection: L greater trochanteric bursa  Date/Time: 6/4/2020 1:15 PM  Performed by: Syed Jauregui MD  Authorized by: Syed Jauregui MD     Consent Done?:  Yes (Verbal)  Indications:  Pain  Timeout: prior to procedure the correct patient, procedure, and site was verified    Prep: patient was prepped and draped in usual sterile fashion    Local anesthetic:  Lidocaine 1% without epinephrine  Anesthetic total (ml):  5      Details:  Needle Size:  21 G  Approach:  Lateral  Location:  Hip  Site:  L greater trochanteric bursa  Medications:  40 mg triamcinolone acetonide 40 mg/mL  Patient tolerance:  Patient tolerated the procedure well with no immediate complications

## 2020-06-04 NOTE — PROGRESS NOTES
Chief Complaint   Patient presents with    Left Hip - Pain      HPI:   This is a 89 y.o. who presents to clinic today for left hip pain for the past 1 months after no known trauma. Complains of pain while sleeping on side and when descending stairs. Pain is dull. No numbness or tingling. No associated signs or symptoms.      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      aspirin (ECOTRIN) 81 MG EC tablet Take 81 mg by mouth Daily.      cholecalciferol, vitamin D3, (VITAMIN D3) 5,000 unit Tab Take 5,000 Units by mouth once daily.      escitalopram oxalate (LEXAPRO) 20 MG tablet Take 1 tablet (20 mg total) by mouth once daily. 90 tablet 3    guaiFENesin (MUCINEX) 600 mg 12 hr tablet Take 1,200 mg by mouth 2 (two) times daily.      levothyroxine (SYNTHROID) 75 MCG tablet TAKE 1 TABLET BY MOUTH EVERY DAY 90 tablet 1    losartan (COZAAR) 50 MG tablet       metoprolol succinate (TOPROL-XL) 25 MG 24 hr tablet 2 (two) times daily.       predniSONE (DELTASONE) 20 MG tablet Take 3 tablets daily for 3 days, then 2 tablets daily for 3 days, then 1 tablet daily for 3 days 18 tablet 0    ranitidine (ZANTAC) 150 MG tablet Take 1 tablet (150 mg total) by mouth 2 (two) times daily. 180 tablet 3    varicella-zoster gE-AS01B,  PF, (SHINGRIX, PF,) 50 mcg/0.5 mL injection Inject 0.5 mLs into the muscle. 0.5 mL 1     No current facility-administered medications on file prior to visit.      Review of patient's allergies indicates:  No Known Allergies  Family History   Problem Relation Age of Onset    Cancer Mother     Breast cancer Mother     Diabetes Father     Stroke Father     Coronary artery disease Father      Social History     Socioeconomic History    Marital status:      Spouse name: Not on file    Number of children: 3    Years of education: Not on file    Highest education level: Not on file   Occupational History    Not on file   Social Needs    Financial resource strain: Somewhat hard    Food insecurity:     Worry: Never true     Inability: Never true    Transportation needs:     Medical: No     Non-medical: No   Tobacco Use    Smoking status: Never Smoker    Smokeless tobacco: Never Used   Substance and Sexual Activity    Alcohol use: No     Frequency: 4 or more times a week     Drinks per session: 1 or 2     Binge frequency: Never    Drug use: No    Sexual activity: Never     Birth control/protection: Surgical   Lifestyle    Physical activity:     Days per week: 5 days     Minutes per session: 60 min    Stress: Not on file   Relationships    Social connections:     Talks on phone: More than three times a week     Gets together: Once a week     Attends Jehovah's witness service: Not on file     Active member of club or organization: Yes     Attends meetings of clubs or organizations: More than 4 times per year     Relationship status:    Other Topics Concern    Patient feels they ought to cut down on drinking/drug use Not Asked    Patient annoyed by others criticizing their drinking/drug use Not Asked    Patient has felt bad or guilty about drinking/drug use Not Asked    Patient has had a drink/used drugs as an eye opener in the AM Not Asked   Social History Narrative    Not on file       Review of  Systems:  Constitutional:  Denies fever or chills   Eyes:  Denies change in visual acuity   HENT:  Denies nasal congestion or sore throat   Respiratory:  Denies cough or shortness of breath   Cardiovascular:  Denies chest pain or edema   GI:  Denies abdominal pain, nausea, vomiting, bloody stools or diarrhea   :  Denies dysuria   Integument:  Denies rash   Neurologic:  Denies headache, focal weakness or sensory changes   Endocrine:  Denies polyuria or polydipsia   Lymphatic:  Denies swollen glands   Psychiatric:  Denies depression or anxiety     Physical Exam:   Constitutional:  Well developed, well nourished, no acute distress, non-toxic appearance   Integument:  Well hydrated, no rash   Lymphatic:  No lymphadenopathy noted   Neurologic:  Alert & oriented x 3  Psychiatric:  Speech and behavior appropriate     Bilateral Hip Exam    right Hip Exam   right hip exam performed same as contralateral hip and is normal.     left Hip Exam   Tenderness   The patient is experiencing tenderness in the greater trochanter.  Range of Motion   The patient has normal hip ROM.  Muscle Strength   Abduction: 4/5   Other   Erythema: absent  Sensation: normal  Pulse: present        Greater trochanteric bursitis of left hip  -     Large Joint Aspiration/Injection: L greater trochanteric bursa           Using an aseptic technique, I injected 5 cc of lidocaine 1% without and 1 cc of kenalog 40mg into the left Hip. The patient tolerated this well. I will have them return to clinic 6 week.

## 2020-06-05 ENCOUNTER — TELEPHONE (OUTPATIENT)
Dept: PHARMACY | Facility: AMBULARY SURGERY CENTER | Age: 85
End: 2020-06-05

## 2020-06-06 ENCOUNTER — NURSE TRIAGE (OUTPATIENT)
Dept: ADMINISTRATIVE | Facility: CLINIC | Age: 85
End: 2020-06-06

## 2020-06-06 NOTE — TELEPHONE ENCOUNTER
Janneth says her 89 yo mom Silke has been having pain to left hip, leg x 1 mo. She is sleeping now, so unable to speak with Silke herself.  Janneth said she had PT x 1 week, then went to [facility]. Has taken steroid pack that was ordered 10 days ago. Saw ortho on 06/04, received injection to left hip for bursitis.  Now today, she states she can barely use her leg. Daughter Janneth says she thinks mom is anxious about returning to her apartment, as she has been with Janneth for the last 9 weeks, and does have her own independent living apartment at [facility] in [place].  Explained that mom does have osteoporosis, peripheral neuropathy, and PVD.  Janneth says she has been complaining of leg and hip pain for a month, but now she states she cannot bend that left leg due to pain.  Explained to her that this indicates that she needs evaluation now, as she cannot bear weight to walk.  Recommend put off moving back to [facility], and bring mom to ED now for evaluation.  Message to Ronnie Szymanski MD, pcp, and to Syed Jauregui MD ortho.  Please contact caller directly with any additional care advice.      Reason for Disposition   Can't stand (bear weight) or walk    Additional Information   Negative: Looks like a broken bone or dislocated joint (e.g., crooked or deformed)   Negative: Sounds like a life-threatening emergency to the triager   Negative: Followed a hip injury   Negative: Leg pain is main symptom   Negative: Back pain radiating (shooting) into hip   Negative: [1] SEVERE pain (e.g., excruciating, unable to do any normal activities) AND [2] fever    Protocols used: HIP PAIN-A-AH

## 2020-06-08 ENCOUNTER — TELEPHONE (OUTPATIENT)
Dept: ORTHOPEDICS | Facility: CLINIC | Age: 85
End: 2020-06-08

## 2020-06-08 NOTE — TELEPHONE ENCOUNTER
Called daughter back. She states that mother is now feeling better.  States that over the weekend pt was in a lot of pain in the hip area. States that pt was taking Advil and Tylenol, even tried Aleve. Feels that the injection has started to work today. Advised that can also ice the hip if needed and can look up exercises for Trochanteric bursitis. Advised daughter to let us know if any other questions or concerns. Thanks, Karis

## 2020-06-08 NOTE — TELEPHONE ENCOUNTER
----- Message from Yadira Rothman MA sent at 6/8/2020  8:32 AM CDT -----  Contact: daughter Janneth Gallegos   States she suffered all weekend, (called nurse over the weekend)  Still in pain   Call back

## 2020-06-16 ENCOUNTER — PES CALL (OUTPATIENT)
Dept: ADMINISTRATIVE | Facility: CLINIC | Age: 85
End: 2020-06-16

## 2020-06-16 NOTE — TELEPHONE ENCOUNTER
Per phone message from ortho:        Called daughter back. She states that mother is now feeling better.  States that over the weekend pt was in a lot of pain in the hip area. States that pt was taking Advil and Tylenol, even tried Aleve. Feels that the injection has started to work today. Advised that can also ice the hip if needed and can look up exercises for Trochanteric bursitis. Advised daughter to let us know if any other questions or concerns. Thanks, Karis

## 2020-06-17 ENCOUNTER — PES CALL (OUTPATIENT)
Dept: ADMINISTRATIVE | Facility: CLINIC | Age: 85
End: 2020-06-17

## 2020-06-18 ENCOUNTER — PES CALL (OUTPATIENT)
Dept: ADMINISTRATIVE | Facility: CLINIC | Age: 85
End: 2020-06-18

## 2020-06-23 ENCOUNTER — TELEPHONE (OUTPATIENT)
Dept: ORTHOPEDICS | Facility: CLINIC | Age: 85
End: 2020-06-23

## 2020-06-23 NOTE — TELEPHONE ENCOUNTER
Called daughter back. States that pt is c/o tingling in her ankle and feet. Seems to notice it more on days where she does exercise. States that the hip feels better now from the injection. Advised daughter that Dr. Jauregui is out of the office this week. Daughter states that pt does see Dr. Jonas for neuropathy. Advised daughter to call his office to see if they have any recommendations. Also advised daughter that she can try contacting PCP's office to see what they think. Thanks, Karis

## 2020-06-23 NOTE — TELEPHONE ENCOUNTER
----- Message from Yadira Rothman MA sent at 6/23/2020  9:22 AM CDT -----  Contact: daughter, adriana boggs  Wants to discuss last injection   Call back

## 2020-06-23 NOTE — TELEPHONE ENCOUNTER
----- Message from Patricio Smith sent at 6/23/2020  1:58 PM CDT -----  Regarding: return call  Contact: pt   [phone]  Returned call ,try again

## 2020-06-30 RX ORDER — TRIAMCINOLONE ACETONIDE 40 MG/ML
40 INJECTION, SUSPENSION INTRA-ARTICULAR; INTRAMUSCULAR
Status: DISCONTINUED | OUTPATIENT
Start: 2020-06-04 | End: 2020-06-30 | Stop reason: HOSPADM

## 2020-07-02 ENCOUNTER — OFFICE VISIT (OUTPATIENT)
Dept: HOME HEALTH SERVICES | Facility: CLINIC | Age: 85
End: 2020-07-02
Payer: MEDICARE

## 2020-07-02 VITALS
TEMPERATURE: 98 F | HEIGHT: 64 IN | HEART RATE: 68 BPM | SYSTOLIC BLOOD PRESSURE: 163 MMHG | RESPIRATION RATE: 14 BRPM | WEIGHT: 130 LBS | BODY MASS INDEX: 22.2 KG/M2 | OXYGEN SATURATION: 98 % | DIASTOLIC BLOOD PRESSURE: 78 MMHG

## 2020-07-02 DIAGNOSIS — K21.9 GASTROESOPHAGEAL REFLUX DISEASE, ESOPHAGITIS PRESENCE NOT SPECIFIED: ICD-10-CM

## 2020-07-02 DIAGNOSIS — I70.0 AORTIC ATHEROSCLEROSIS: ICD-10-CM

## 2020-07-02 DIAGNOSIS — Z85.43 HISTORY OF OVARIAN CANCER: ICD-10-CM

## 2020-07-02 DIAGNOSIS — N18.30 CHRONIC KIDNEY DISEASE, STAGE III (MODERATE): ICD-10-CM

## 2020-07-02 DIAGNOSIS — Z85.3 HISTORY OF BREAST CANCER: ICD-10-CM

## 2020-07-02 DIAGNOSIS — I77.9 CAROTID ARTERY DISEASE, UNSPECIFIED LATERALITY, UNSPECIFIED TYPE: ICD-10-CM

## 2020-07-02 DIAGNOSIS — I73.9 PERIPHERAL VASCULAR DISEASE: ICD-10-CM

## 2020-07-02 DIAGNOSIS — Z00.00 ENCOUNTER FOR PREVENTIVE HEALTH EXAMINATION: Primary | ICD-10-CM

## 2020-07-02 DIAGNOSIS — I10 ESSENTIAL HYPERTENSION: ICD-10-CM

## 2020-07-02 DIAGNOSIS — M81.0 OSTEOPOROSIS, POST-MENOPAUSAL: ICD-10-CM

## 2020-07-02 DIAGNOSIS — G60.9 IDIOPATHIC PERIPHERAL NEUROPATHY: ICD-10-CM

## 2020-07-02 DIAGNOSIS — E03.9 HYPOTHYROIDISM, UNSPECIFIED TYPE: ICD-10-CM

## 2020-07-02 PROCEDURE — 99499 RISK ADDL DX/OHS AUDIT: ICD-10-PCS | Mod: S$GLB,,, | Performed by: NURSE PRACTITIONER

## 2020-07-02 PROCEDURE — 99499 UNLISTED E&M SERVICE: CPT | Mod: S$GLB,,, | Performed by: NURSE PRACTITIONER

## 2020-07-02 PROCEDURE — G0439 PPPS, SUBSEQ VISIT: HCPCS | Mod: S$GLB,,, | Performed by: NURSE PRACTITIONER

## 2020-07-02 PROCEDURE — G0439 PR MEDICARE ANNUAL WELLNESS SUBSEQUENT VISIT: ICD-10-PCS | Mod: S$GLB,,, | Performed by: NURSE PRACTITIONER

## 2020-07-06 PROBLEM — Z85.43 HISTORY OF OVARIAN CANCER: Status: ACTIVE | Noted: 2020-07-06

## 2020-07-06 NOTE — PATIENT INSTRUCTIONS
Counseling and Referral of Other Preventative  (Italic type indicates deductible and co-insurance are waived)    Patient Name: Silke Dodd  Today's Date: 7/6/2020    Health Maintenance       Date Due Completion Date    TETANUS VACCINE 10/01/1946 ---    Influenza Vaccine (1) 09/01/2020 9/13/2019    Lipid Panel 03/03/2025 3/3/2020        No orders of the defined types were placed in this encounter.    The following information is provided to all patients.  This information is to help you find resources for any of the problems found today that may be affecting your health:                Living healthy guide: www.[facility].[place].[facility]      Understanding Diabetes: www.diabetes.org      Eating healthy: www.cdc.gov/healthyweight      CDC home safety checklist: www.cdc.gov/steadi/patient.html      Agency on Aging: www.goea.[place].[facility]      Alcoholics anonymous (AA): www.aa.org      Physical Activity: www.meghna.nih.gov/us1juic      Tobacco use: www.quitwithusla.org

## 2020-07-06 NOTE — PROGRESS NOTES
"  Silke Dodd presented for a  Medicare AWV and comprehensive Health Risk Assessment today. The following components were reviewed and updated:    · Medical history  · Family History  · Social history  · Allergies and Current Medications  · Health Risk Assessment  · Health Maintenance  · Care Team     ** See Completed Assessments for Annual Wellness Visit within the encounter summary.**       The following assessments were completed:  · Living Situation  · CAGE  · Depression Screening  · Timed Get Up and Go  · Whisper Test  · Cognitive Function Screening  · Nutrition Screening  · ADL Screening  · PAQ Screening    Vitals:    07/02/20 1459   BP: (!) 163/78   Pulse: 68   Resp: 14   Temp: 97.7 °F (36.5 °C)   TempSrc: Temporal   SpO2: 98%   Weight: 59 kg (130 lb)   Height: 5' 4" (1.626 m)     Body mass index is 22.31 kg/m².  Physical Exam  Constitutional:       Appearance: Normal appearance.   HENT:      Head: Normocephalic and atraumatic.      Nose: Nose normal.   Eyes:      Extraocular Movements: Extraocular movements intact.      Pupils: Pupils are equal, round, and reactive to light.   Neck:      Musculoskeletal: Normal range of motion and neck supple.   Cardiovascular:      Rate and Rhythm: Normal rate and regular rhythm.      Pulses: Normal pulses.      Heart sounds: Normal heart sounds.   Pulmonary:      Effort: Pulmonary effort is normal.      Breath sounds: Normal breath sounds.   Musculoskeletal: Normal range of motion.   Skin:     General: Skin is warm and dry.   Neurological:      General: No focal deficit present.      Mental Status: She is alert and oriented to person, place, and time.           Diagnoses and health risks identified today and associated recommendations/orders:    1. Encounter for preventive health examination  AWV completed    2. Chronic kidney disease, stage III (moderate)  Chronic and stable. Continue current treatment. Follow with PCP.  Followed with labs     3. History of ovarian " cancer  Chronic and stable. Continue current treatment. Follow with PCP.      4. Essential hypertension  Chronic and stable. Continue current treatment. Follow with PCP.      5. History of breast cancer  Chronic and stable. Continue current treatment. Follow with PCP.      6. Hypothyroidism, unspecified type  Chronic and stable. Continue current treatment. Follow with PCP.  On meds and followed with labs    7. Idiopathic peripheral neuropathy  Chronic and stable. Continue current treatment. Follow with PCP.      8. Peripheral vascular disease  Chronic and stable. Continue current treatment. Follow with PCP.      9. Aortic atherosclerosis  Chronic and stable. Continue current treatment. Follow with PCP.    10. Carotid artery disease, unspecified laterality, unspecified type  Chronic and stable. Continue current treatment. Follow with PCP.      11. Gastroesophageal reflux disease, esophagitis presence not specified  Chronic and stable. Continue current treatment. Follow with PCP.    12. Osteoporosis, post-menopausal  Chronic and stable. Continue current treatment. Follow with PCP.  Replacement of vit d discussed         Provided Silke with a 5-10 year written screening schedule and personal prevention plan. Recommendations were developed using the USPSTF age appropriate recommendations. Education, counseling, and referrals were provided as needed. After Visit Summary printed and given to patient which includes a list of additional screenings\tests needed.    Fu 1 yr for nicole Langley NP  I offered to discuss end of life issues, including information on how to make advance directives that the patient could use to name someone who would make medical decisions on their behalf if they became too ill to make themselves.    ___Patient declined  _X_Patient is interested, I provided paper work and offered to discuss.

## 2020-07-14 ENCOUNTER — TELEPHONE (OUTPATIENT)
Dept: ORTHOPEDICS | Facility: CLINIC | Age: 85
End: 2020-07-14

## 2020-07-14 NOTE — TELEPHONE ENCOUNTER
Attempted to call number listed. Was advised that the number is the  desk and the can not connect me to pt. Will call pt again tomorrow. Thanks, Karis

## 2020-07-14 NOTE — TELEPHONE ENCOUNTER
----- Message from Angelica Goodman sent at 7/14/2020  4:49 PM CDT -----  Regarding: : Need medical advice  Contact: pt  Type: Need medical advice      Who Called: pt  Best Call Back Number:   [phone]  Additional Information:  Pt would like a call back in regards to her hip injection

## 2020-07-16 NOTE — TELEPHONE ENCOUNTER
Tried calling pt again. No answer and no voicemail picked up today. Will send My Chart message to pt. Thanks, Karis

## 2020-08-04 ENCOUNTER — TELEPHONE (OUTPATIENT)
Dept: ORTHOPEDICS | Facility: CLINIC | Age: 85
End: 2020-08-04

## 2020-08-04 NOTE — TELEPHONE ENCOUNTER
Returned patients call regarding L Hip Pain. Options discussed. Patient wishes to think about coming back for injection. Will call once ready to schedule.

## 2020-08-04 NOTE — TELEPHONE ENCOUNTER
----- Message from Paty Back sent at 8/4/2020  9:15 AM CDT -----  Patient is requesting a return call regarding the hip pain she saw you about in June.  She wants to know what she can do about it.  Her number is [phone].  Thank you!

## 2020-08-05 ENCOUNTER — OFFICE VISIT (OUTPATIENT)
Dept: FAMILY MEDICINE | Facility: CLINIC | Age: 85
End: 2020-08-05
Payer: MEDICARE

## 2020-08-05 VITALS
DIASTOLIC BLOOD PRESSURE: 82 MMHG | WEIGHT: 133.06 LBS | HEART RATE: 64 BPM | SYSTOLIC BLOOD PRESSURE: 136 MMHG | OXYGEN SATURATION: 97 % | HEIGHT: 64 IN | BODY MASS INDEX: 22.71 KG/M2 | TEMPERATURE: 98 F

## 2020-08-05 DIAGNOSIS — C50.911 MALIGNANT NEOPLASM OF RIGHT FEMALE BREAST, UNSPECIFIED ESTROGEN RECEPTOR STATUS, UNSPECIFIED SITE OF BREAST: ICD-10-CM

## 2020-08-05 DIAGNOSIS — I10 ESSENTIAL HYPERTENSION: ICD-10-CM

## 2020-08-05 DIAGNOSIS — E03.9 HYPOTHYROIDISM, UNSPECIFIED TYPE: Primary | ICD-10-CM

## 2020-08-05 DIAGNOSIS — M79.605 LEFT LEG PAIN: ICD-10-CM

## 2020-08-05 PROCEDURE — 99999 PR PBB SHADOW E&M-EST. PATIENT-LVL III: ICD-10-PCS | Mod: PBBFAC,HCNC,, | Performed by: INTERNAL MEDICINE

## 2020-08-05 PROCEDURE — 1101F PT FALLS ASSESS-DOCD LE1/YR: CPT | Mod: HCNC,CPTII,S$GLB, | Performed by: INTERNAL MEDICINE

## 2020-08-05 PROCEDURE — 99999 PR PBB SHADOW E&M-EST. PATIENT-LVL III: CPT | Mod: PBBFAC,HCNC,, | Performed by: INTERNAL MEDICINE

## 2020-08-05 PROCEDURE — 99214 PR OFFICE/OUTPT VISIT, EST, LEVL IV, 30-39 MIN: ICD-10-PCS | Mod: HCNC,S$GLB,, | Performed by: INTERNAL MEDICINE

## 2020-08-05 PROCEDURE — 99499 UNLISTED E&M SERVICE: CPT | Mod: HCNC,S$GLB,, | Performed by: INTERNAL MEDICINE

## 2020-08-05 PROCEDURE — 1125F PR PAIN SEVERITY QUANTIFIED, PAIN PRESENT: ICD-10-PCS | Mod: HCNC,S$GLB,, | Performed by: INTERNAL MEDICINE

## 2020-08-05 PROCEDURE — 1101F PR PT FALLS ASSESS DOC 0-1 FALLS W/OUT INJ PAST YR: ICD-10-PCS | Mod: HCNC,CPTII,S$GLB, | Performed by: INTERNAL MEDICINE

## 2020-08-05 PROCEDURE — 1125F AMNT PAIN NOTED PAIN PRSNT: CPT | Mod: HCNC,S$GLB,, | Performed by: INTERNAL MEDICINE

## 2020-08-05 PROCEDURE — 1159F PR MEDICATION LIST DOCUMENTED IN MEDICAL RECORD: ICD-10-PCS | Mod: HCNC,S$GLB,, | Performed by: INTERNAL MEDICINE

## 2020-08-05 PROCEDURE — 1159F MED LIST DOCD IN RCRD: CPT | Mod: HCNC,S$GLB,, | Performed by: INTERNAL MEDICINE

## 2020-08-05 PROCEDURE — 99214 OFFICE O/P EST MOD 30 MIN: CPT | Mod: HCNC,S$GLB,, | Performed by: INTERNAL MEDICINE

## 2020-08-05 PROCEDURE — 99499 RISK ADDL DX/OHS AUDIT: ICD-10-PCS | Mod: HCNC,S$GLB,, | Performed by: INTERNAL MEDICINE

## 2020-08-05 RX ORDER — DICLOFENAC SODIUM 10 MG/G
2 GEL TOPICAL DAILY
Qty: 20 G | Refills: 1 | Status: SHIPPED | OUTPATIENT
Start: 2020-08-05 | End: 2024-02-08 | Stop reason: SDUPTHER

## 2020-08-05 RX ORDER — LOSARTAN POTASSIUM 50 MG/1
50 TABLET ORAL DAILY
Qty: 90 TABLET | Refills: 3 | Status: SHIPPED | OUTPATIENT
Start: 2020-08-05 | End: 2021-02-17 | Stop reason: SDUPTHER

## 2020-08-05 RX ORDER — METOPROLOL SUCCINATE 25 MG/1
25 TABLET, EXTENDED RELEASE ORAL 2 TIMES DAILY
Qty: 180 TABLET | Refills: 3 | Status: SHIPPED | OUTPATIENT
Start: 2020-08-05 | End: 2021-02-17 | Stop reason: SDUPTHER

## 2020-08-05 RX ORDER — LEVOTHYROXINE SODIUM 75 UG/1
75 TABLET ORAL DAILY
Qty: 90 TABLET | Refills: 1 | Status: SHIPPED | OUTPATIENT
Start: 2020-08-05 | End: 2021-02-17 | Stop reason: SDUPTHER

## 2020-08-05 NOTE — PROGRESS NOTES
Subjective:       Patient ID: Silke Dodd is a 89 y.o. female.    Chief Complaint: No chief complaint on file.      HPI:  New to me.  Past medical history includes peripheral vascular disease, carotid arterial disease, hypertension, hypothyroid, osteoporosis    Here today for annual.  Labs from March of 2020 showed normal TSH, elevated total cholesterol, slight decreased GFR.    A/P:   Hypertension:  -  Hypothyroidism:  -  Depression:  -  Peripheral vascular disease:  -on aspirin, but not on statin.  -would likely not be beneficial to start statin at her age    Current Outpatient Medications on File Prior to Visit   Medication Sig Dispense Refill    acetaminophen (TYLENOL) 325 MG tablet Take 325 mg by mouth every 6 (six) hours as needed for Pain.      aspirin (ECOTRIN) 81 MG EC tablet Take 81 mg by mouth Daily.      cholecalciferol, vitamin D3, (VITAMIN D3) 5,000 unit Tab Take 5,000 Units by mouth once daily.      escitalopram oxalate (LEXAPRO) 20 MG tablet Take 1 tablet (20 mg total) by mouth once daily. 90 tablet 3    guaiFENesin (MUCINEX) 600 mg 12 hr tablet Take 1,200 mg by mouth 2 (two) times daily.      levothyroxine (SYNTHROID) 75 MCG tablet TAKE 1 TABLET BY MOUTH EVERY DAY 90 tablet 1    losartan (COZAAR) 50 MG tablet       metoprolol succinate (TOPROL-XL) 25 MG 24 hr tablet 2 (two) times daily.       varicella-zoster gE-AS01B, PF, (SHINGRIX, PF,) 50 mcg/0.5 mL injection Inject 0.5 mLs into the muscle. 0.5 mL 1     No current facility-administered medications on file prior to visit.      I personally reviewed past medical, family and social history.  Review of Systems      Objective:   There were no vitals filed for this visit.     Physical Exam      Assessment/Plan   There are no diagnoses linked to this encounter.

## 2020-08-05 NOTE — PROGRESS NOTES
Patient ID: Silke Dodd is a 89 y.o. female.    Chief Complaint: Establish Care      Social Hx:  From [place]. Now living at [facility].  2016 ( 69 yrs). 3 children. Son passed from hypothermia. Lives alone. She gets around well.     HPI:  Here today for annual.  Labs from March of 2020 showed normal TSH, elevated total cholesterol, slight decreased GFR.    Had left hip pain and saw Dr. Jauregui. Treated for greater trochanteric bursitis. She received steroid injection with some relief. Takes advil and tylenol occasionally. Pain now is at the back of left leg. Pain was in the left hip, that is resolved. No pain in back. She is able to exercise. Pain is not worse with walking. Does not want to go back to PT at this time.   -will trial topical diclofenac.     PMH and A/P:  Hypertension: losartan and metoprolol   -controlled   Hypothyroidism: levothyroxine  -controlled   Anxiety and Depression: lexapro  -not taking consistently   -will dc for now.   Peripheral vascular disease: Saw Dr. Cordova. Had angiogram was negative.   -on aspirin, but not on statin 2/2 intolerance.   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    Ovarian cancer     Panic disorder      Surg Hx:   Past Surgical History:   Procedure Laterality Date    CATARACT EXTRACTION Bilateral     corrected for monovision    HYSTERECTOMY  1975    Cancer cells    MASTECTOMY Right 2015    TOTAL ABDOMINAL HYSTERECTOMY W/ BILATERAL SALPINGOOPHORECTOMY  1975     Fhx:   Family History   Problem Relation Age of Onset    Cancer Mother     Breast cancer Mother     Diabetes Father     Stroke Father      Coronary artery disease Father      Social Hx:   Social History     Socioeconomic History    Marital status:      Spouse name: Not on file    Number of children: 3    Years of education: Not on file    Highest education level: Not on file   Occupational History    Not on file   Social Needs    Financial resource strain: Somewhat hard    Food insecurity     Worry: Never true     Inability: Never true    Transportation needs     Medical: No     Non-medical: No   Tobacco Use    Smoking status: Never Smoker    Smokeless tobacco: Never Used   Substance and Sexual Activity    Alcohol use: No     Frequency: 4 or more times a week     Drinks per session: 1 or 2     Binge frequency: Never    Drug use: No    Sexual activity: Never     Birth control/protection: Surgical   Lifestyle    Physical activity     Days per week: 5 days     Minutes per session: 60 min    Stress: Not on file   Relationships    Social connections     Talks on phone: More than three times a week     Gets together: Once a week     Attends Hindu service: Not on file     Active member of club or organization: Yes     Attends meetings of clubs or organizations: More than 4 times per year     Relationship status:    Other Topics Concern    Patient feels they ought to cut down on drinking/drug use Not Asked    Patient annoyed by others criticizing their drinking/drug use Not Asked    Patient has felt bad or guilty about drinking/drug use Not Asked    Patient has had a drink/used drugs as an eye opener in the AM Not Asked   Social History Narrative    Not on file       Current Outpatient Medications on File Prior to Visit   Medication Sig Dispense Refill    acetaminophen (TYLENOL) 325 MG tablet Take 325 mg by mouth every 6 (six) hours as needed for Pain.      aspirin (ECOTRIN) 81 MG EC tablet Take 81 mg by mouth Daily.      cholecalciferol, vitamin D3, (VITAMIN D3) 5,000 unit Tab Take 5,000 Units by mouth once daily.       escitalopram oxalate (LEXAPRO) 20 MG tablet Take 1 tablet (20 mg total) by mouth once daily. 90 tablet 3    [DISCONTINUED] levothyroxine (SYNTHROID) 75 MCG tablet TAKE 1 TABLET BY MOUTH EVERY DAY 90 tablet 1    [DISCONTINUED] losartan (COZAAR) 50 MG tablet       [DISCONTINUED] metoprolol succinate (TOPROL-XL) 25 MG 24 hr tablet 2 (two) times daily.       guaiFENesin (MUCINEX) 600 mg 12 hr tablet Take 1,200 mg by mouth 2 (two) times daily.      [DISCONTINUED] varicella-zoster gE-AS01B, PF, (SHINGRIX, PF,) 50 mcg/0.5 mL injection Inject 0.5 mLs into the muscle. (Patient not taking: Reported on 8/5/2020) 0.5 mL 1     No current facility-administered medications on file prior to visit.      I personally reviewed past medical, family and social history.  Review of Systems   Constitutional: Negative for activity change and fever.   HENT: Negative for sore throat and trouble swallowing.    Eyes: Negative for pain and visual disturbance.   Respiratory: Negative for cough, shortness of breath and wheezing.    Cardiovascular: Negative for chest pain, palpitations and leg swelling.   Gastrointestinal: Negative for abdominal pain, blood in stool, diarrhea, nausea and vomiting.   Endocrine: Negative for cold intolerance and polyuria.   Genitourinary: Negative for decreased urine volume and dysuria.   Musculoskeletal: Positive for arthralgias. Negative for gait problem and neck pain.   Skin: Negative for rash.   Neurological: Negative for dizziness, syncope and light-headedness.   Psychiatric/Behavioral: Negative for dysphoric mood. The patient is not nervous/anxious.        Objective:     Vitals:    08/05/20 1029   BP: 136/82   Pulse: 64   Temp: 98.1 °F (36.7 °C)        Physical Exam  Constitutional:       General: She is not in acute distress.     Appearance: She is well-developed.   HENT:      Head: Normocephalic and atraumatic.   Eyes:      Pupils: Pupils are equal, round, and reactive to light.   Neck:       Musculoskeletal: Neck supple.      Thyroid: No thyromegaly.   Cardiovascular:      Rate and Rhythm: Normal rate and regular rhythm.      Heart sounds: Normal heart sounds. No murmur. No friction rub. No gallop.    Pulmonary:      Effort: Pulmonary effort is normal. No respiratory distress.      Breath sounds: Normal breath sounds. No wheezing.   Abdominal:      General: Bowel sounds are normal.      Palpations: Abdomen is soft.      Tenderness: There is no abdominal tenderness.   Skin:     General: Skin is warm.      Findings: No rash.   Neurological:      Mental Status: She is alert and oriented to person, place, and time.      Cranial Nerves: No cranial nerve deficit.   Psychiatric:         Behavior: Behavior normal.           Assessment/Plan   Silke was seen today for establish care.    Diagnoses and all orders for this visit:    Hypothyroidism, unspecified type    Essential hypertension  -     Basic metabolic panel; Future    Left leg pain  -     diclofenac sodium (VOLTAREN) 1 % Gel; Apply 2 g topically once daily.    Malignant neoplasm of right female breast, unspecified estrogen receptor status, unspecified site of breast    Other orders  -     levothyroxine (SYNTHROID) 75 MCG tablet; Take 1 tablet (75 mcg total) by mouth once daily.  -     losartan (COZAAR) 50 MG tablet; Take 1 tablet (50 mg total) by mouth once daily.  -     metoprolol succinate (TOPROL-XL) 25 MG 24 hr tablet; Take 1 tablet (25 mg total) by mouth 2 (two) times daily.        Follow up in about 6 months (around 2/5/2021) for check up .

## 2020-08-06 ENCOUNTER — TELEPHONE (OUTPATIENT)
Dept: FAMILY MEDICINE | Facility: CLINIC | Age: 85
End: 2020-08-06

## 2020-08-06 ENCOUNTER — TELEPHONE (OUTPATIENT)
Dept: ORTHOPEDICS | Facility: CLINIC | Age: 85
End: 2020-08-06

## 2020-08-06 NOTE — TELEPHONE ENCOUNTER
Patient called requesting advice on voltaren gel. Explained we prescribe that to our patients as needed and we do not see a problem with this medicated topical gel.

## 2020-08-06 NOTE — TELEPHONE ENCOUNTER
----- Message from El Stanley sent at 8/6/2020  8:35 AM CDT -----  Regarding: rx  Contact: self  Type: Needs Medical Advice  Who Called: self  Symptoms (please be specific):    How long has patient had these symptoms:   Pharmacy name and phone #:    Best Call Back Number: [phone]  Additional Information: Patient requesting to speak with the nurse regarding medication.

## 2020-08-06 NOTE — TELEPHONE ENCOUNTER
Pt was called after reading the side effects of the medication concerned about its safety. She stated that she was not having any side effects at this time. Pt was advised that if at anytime she was having any issues she is to call the office. She verbalized understanding

## 2020-09-08 ENCOUNTER — PATIENT OUTREACH (OUTPATIENT)
Dept: ADMINISTRATIVE | Facility: OTHER | Age: 85
End: 2020-09-08

## 2020-09-08 ENCOUNTER — IMMUNIZATION (OUTPATIENT)
Dept: PHARMACY | Facility: CLINIC | Age: 85
End: 2020-09-08
Payer: MEDICARE

## 2020-09-08 ENCOUNTER — OFFICE VISIT (OUTPATIENT)
Dept: PODIATRY | Facility: CLINIC | Age: 85
End: 2020-09-08
Payer: MEDICARE

## 2020-09-08 VITALS — WEIGHT: 132.94 LBS | BODY MASS INDEX: 22.7 KG/M2 | HEIGHT: 64 IN

## 2020-09-08 DIAGNOSIS — B35.1 ONYCHOMYCOSIS DUE TO DERMATOPHYTE: ICD-10-CM

## 2020-09-08 DIAGNOSIS — M20.41 HAMMER TOES OF BOTH FEET: ICD-10-CM

## 2020-09-08 DIAGNOSIS — I73.9 PERIPHERAL VASCULAR DISEASE: ICD-10-CM

## 2020-09-08 DIAGNOSIS — M20.42 HAMMER TOES OF BOTH FEET: ICD-10-CM

## 2020-09-08 DIAGNOSIS — G60.9 IDIOPATHIC PERIPHERAL NEUROPATHY: Primary | ICD-10-CM

## 2020-09-08 PROCEDURE — 11721 DEBRIDE NAIL 6 OR MORE: CPT | Mod: Q9,HCNC,S$GLB, | Performed by: PODIATRIST

## 2020-09-08 PROCEDURE — 99499 UNLISTED E&M SERVICE: CPT | Mod: HCNC,S$GLB,, | Performed by: PODIATRIST

## 2020-09-08 PROCEDURE — 99499 NO LOS: ICD-10-PCS | Mod: HCNC,S$GLB,, | Performed by: PODIATRIST

## 2020-09-08 PROCEDURE — 99999 PR PBB SHADOW E&M-EST. PATIENT-LVL III: ICD-10-PCS | Mod: PBBFAC,HCNC,, | Performed by: PODIATRIST

## 2020-09-08 PROCEDURE — 11721 PR DEBRIDEMENT OF NAILS, 6 OR MORE: ICD-10-PCS | Mod: Q9,HCNC,S$GLB, | Performed by: PODIATRIST

## 2020-09-08 PROCEDURE — 99999 PR PBB SHADOW E&M-EST. PATIENT-LVL III: CPT | Mod: PBBFAC,HCNC,, | Performed by: PODIATRIST

## 2020-09-08 NOTE — PROGRESS NOTES
Subjective:      Patient ID: Silke Dodd is a 89 y.o. female.    Chief Complaint: Peripheral Neuropathy (:Phillip 8/20)    Silke is a 89 y.o. female who presents to the clinic for evaluation and treatment of high risk feet. 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 The patient's chief complaint consists of toenails in need of trimming.  Denies being painful with wearing shoe gear.  Has not attempted to self treat.  Relates that prior pain in the Lt. Lower extremity has improved, according to her daughter.  States that she has been applying voltaren cream to the affected areas daily.  Patient relates having residual pain posterior to the knee, but denies this limiting her activities of daily living.  Denies any additional pedal complaints.       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    Ovarian cancer     Panic disorder        Past Surgical History:   Procedure Laterality Date    CATARACT EXTRACTION Bilateral     corrected for monovision    HYSTERECTOMY  1975    Cancer cells    MASTECTOMY Right 2015    TOTAL ABDOMINAL HYSTERECTOMY W/ BILATERAL SALPINGOOPHORECTOMY  1975       Family History   Problem Relation Age of Onset    Cancer Mother     Breast cancer Mother     Diabetes Father     Stroke Father     Coronary artery disease Father        Social History     Socioeconomic History    Marital status:      Spouse name: Not on file    Number of children: 3    Years of education: Not on  file    Highest education level: Not on file   Occupational History    Not on file   Social Needs    Financial resource strain: Somewhat hard    Food insecurity     Worry: Never true     Inability: Never true    Transportation needs     Medical: No     Non-medical: No   Tobacco Use    Smoking status: Never Smoker    Smokeless tobacco: Never Used   Substance and Sexual Activity    Alcohol use: No     Frequency: 4 or more times a week     Drinks per session: 1 or 2     Binge frequency: Never    Drug use: No    Sexual activity: Never     Birth control/protection: Surgical   Lifestyle    Physical activity     Days per week: 5 days     Minutes per session: 60 min    Stress: Not on file   Relationships    Social connections     Talks on phone: More than three times a week     Gets together: Once a week     Attends Mor[employer] service: Not on file     Active member of club or organization: Yes     Attends meetings of clubs or organizations: More than 4 times per year     Relationship status:    Other Topics Concern    Patient feels they ought to cut down on drinking/drug use Not Asked    Patient annoyed by others criticizing their drinking/drug use Not Asked    Patient has felt bad or guilty about drinking/drug use Not Asked    Patient has had a drink/used drugs as an eye opener in the AM Not Asked   Social History Narrative    Not on file       Current Outpatient Medications   Medication Sig Dispense Refill    acetaminophen (TYLENOL) 325 MG tablet Take 325 mg by mouth every 6 (six) hours as needed for Pain.      aspirin (ECOTRIN) 81 MG EC tablet Take 81 mg by mouth Daily.      cholecalciferol, vitamin D3, (VITAMIN D3) 5,000 unit Tab Take 5,000 Units by mouth once daily.      diclofenac sodium (VOLTAREN) 1 % Gel Apply 2 g topically once daily. 20 g 1    flu vac 2020 65up-htjPJ84C,PF, (FLUAD QUAD 2020-21,65Y UP,,PF,) 60 mcg (15 mcg x 4)/0.5 mL Syrg Inject 0.5 mLs into the muscle. 0.5 mL 0     guaiFENesin (MUCINEX) 600 mg 12 hr tablet Take 1,200 mg by mouth 2 (two) times daily.      levothyroxine (SYNTHROID) 75 MCG tablet Take 1 tablet (75 mcg total) by mouth once daily. 90 tablet 1    losartan (COZAAR) 50 MG tablet Take 1 tablet (50 mg total) by mouth once daily. 90 tablet 3    metoprolol succinate (TOPROL-XL) 25 MG 24 hr tablet Take 1 tablet (25 mg total) by mouth 2 (two) times daily. 180 tablet 3    escitalopram oxalate (LEXAPRO) 20 MG tablet Take 1 tablet (20 mg total) by mouth once daily. 90 tablet 3     No current facility-administered medications for this visit.        Review of patient's allergies indicates:  No Known Allergies      Review of Systems   Constitution: Negative for chills.   Cardiovascular: Negative for claudication and leg swelling.   Skin: Positive for color change and nail changes.   Musculoskeletal: Negative for joint pain and joint swelling.   Neurological: Positive for numbness and paresthesias.   Psychiatric/Behavioral: Negative for altered mental status.           Objective:      Physical Exam  Constitutional:       General: She is not in acute distress.     Appearance: She is well-developed. She is not diaphoretic.   Cardiovascular:      Pulses:           Dorsalis pedis pulses are 1+ on the right side and 1+ on the left side.        Posterior tibial pulses are 1+ on the right side and 1+ on the left side.      Comments: CFT 3 seconds bilateral.  Pedal hair growth decreased bilateral.   Mild varicosities noted bilateral.  Mild nonpitting edema noted to bilateral lower extremity.  Toes are cool to touch bilateral.    Musculoskeletal:         General: No tenderness.      Comments: Muscle strength 5/5 in all muscle groups bilateral.  No tenderness nor crepitation with ROM of foot/ankle joints bilateral.  No pain with palpation to bilateral foot and ankle.  Bilateral hallux abducto valgus.  Bilateral semi-rigid contracture of toes 2-5.       Skin:     General: Skin is warm  and dry.      Capillary Refill: Capillary refill takes less than 2 seconds.      Coloration: Skin is not pale.      Findings: No abrasion, bruising, burn, ecchymosis, erythema, laceration, lesion, petechiae or rash.      Comments: Pedal skin appears thin bilateral.  Toenails x 10 appear thickened by 2 mm's, elongated by 4 mm's and discolored with subungual debris.  Slight incurvation noted to the medial and lateral borders of the Rt. 3rd and 2nd toenails.  No localized sign of infection noted.  Examination of the skin reveals no evidence of significant maceration, rashes, open lesions, suspicious appearing nevi or other concerning lesions.    Neurological:      Mental Status: She is alert and oriented to person, place, and time.      Sensory: Sensory deficit present.      Comments: Protective sensation per Story-Jose monofilament is absent bilateral.  Vibratory sensation is absent bilateral.  Light touch is absent bilateral.               Assessment:       Encounter Diagnoses   Name Primary?    Idiopathic peripheral neuropathy Yes    Peripheral vascular disease     Hammer toes of both feet     Onychomycosis due to dermatophyte          Plan:       Silke was seen today for peripheral neuropathy.    Diagnoses and all orders for this visit:    Idiopathic peripheral neuropathy    Peripheral vascular disease    Hammer toes of both feet    Onychomycosis due to dermatophyte      I counseled the patient on her conditions, their implications and medical management.    Advised to continue applying OTC voltaren cream prn.    PVD continues to remain stable with today's exam, will continue to monitor.    Patient advised to continue wearing shoe gear that accommodates for digital deformities.      With patient's permission, nails were aggressively reduced and debrided x 10 to their soft tissue attachment mechanically and with electric , removing all offending nail and debris. Patient relates relief following the  procedure. She will continue to monitor the areas daily, inspect her feet, wear protective shoe gear when ambulatory, moisturizer to maintain skin integrity and follow in this office in approximately 2-3 months, sooner p.r.n.    Follow up in about 3 months (around 12/8/2020).    Narayan Jonas DPM

## 2020-09-22 ENCOUNTER — TELEPHONE (OUTPATIENT)
Dept: FAMILY MEDICINE | Facility: CLINIC | Age: 85
End: 2020-09-22

## 2020-09-22 DIAGNOSIS — F41.9 ANXIETY: ICD-10-CM

## 2020-09-22 DIAGNOSIS — Z12.31 ENCOUNTER FOR SCREENING MAMMOGRAM FOR MALIGNANT NEOPLASM OF BREAST: Primary | ICD-10-CM

## 2020-09-22 RX ORDER — ESCITALOPRAM OXALATE 20 MG/1
20 TABLET ORAL DAILY
Qty: 90 TABLET | Refills: 3 | Status: CANCELLED | OUTPATIENT
Start: 2020-09-22 | End: 2021-09-22

## 2020-09-22 RX ORDER — ESCITALOPRAM OXALATE 20 MG/1
20 TABLET ORAL DAILY
Qty: 90 TABLET | Refills: 3 | Status: SHIPPED | OUTPATIENT
Start: 2020-09-22 | End: 2021-02-17 | Stop reason: SDUPTHER

## 2020-09-22 NOTE — TELEPHONE ENCOUNTER
----- Message from Cassia Reardon sent at 9/22/2020  8:41 AM CDT -----  Contact: pt  Pt states that she is needing to speak to the nurse in the office regarding a refill. Rx-escitalopram oxalate (LEXAPRO) 20 MG tablet needs to be called in..[phone] (home)             [id number] - [place] - [address]  [address]  [place] [address]  Phone: [phone] Fax: [phone]

## 2020-09-22 NOTE — TELEPHONE ENCOUNTER
----- Message from Cassia Reardon sent at 9/22/2020  8:46 AM CDT -----  Contact: pt  Pt would like for the Dr to put in orders for her mammogram,last did it 11/19/19,please.[phone] (home)

## 2020-09-28 ENCOUNTER — OFFICE VISIT (OUTPATIENT)
Dept: OPHTHALMOLOGY | Facility: CLINIC | Age: 85
End: 2020-09-28
Payer: MEDICARE

## 2020-09-28 DIAGNOSIS — H35.052 CHOROIDAL NEOVASCULARIZATION, LEFT EYE: Primary | ICD-10-CM

## 2020-09-28 DIAGNOSIS — H43.813 POSTERIOR VITREOUS DETACHMENT, BOTH EYES: ICD-10-CM

## 2020-09-28 PROCEDURE — 92014 COMPRE OPH EXAM EST PT 1/>: CPT | Mod: HCNC,S$GLB,, | Performed by: OPHTHALMOLOGY

## 2020-09-28 PROCEDURE — 92202 OPSCPY EXTND ON/MAC DRAW: CPT | Mod: HCNC,S$GLB,, | Performed by: OPHTHALMOLOGY

## 2020-09-28 PROCEDURE — 99999 PR PBB SHADOW E&M-EST. PATIENT-LVL III: ICD-10-PCS | Mod: PBBFAC,HCNC,, | Performed by: OPHTHALMOLOGY

## 2020-09-28 PROCEDURE — 92014 PR EYE EXAM, EST PATIENT,COMPREHESV: ICD-10-PCS | Mod: HCNC,S$GLB,, | Performed by: OPHTHALMOLOGY

## 2020-09-28 PROCEDURE — 92134 CPTRZ OPH DX IMG PST SGM RTA: CPT | Mod: HCNC,S$GLB,, | Performed by: OPHTHALMOLOGY

## 2020-09-28 PROCEDURE — 99999 PR PBB SHADOW E&M-EST. PATIENT-LVL III: CPT | Mod: PBBFAC,HCNC,, | Performed by: OPHTHALMOLOGY

## 2020-09-28 PROCEDURE — 92202 PR OPHTHALMOSCOPY, EXT, W/DRAW OPTIC NERVE/MACULA, I&R, UNI/BI: ICD-10-PCS | Mod: HCNC,S$GLB,, | Performed by: OPHTHALMOLOGY

## 2020-09-28 PROCEDURE — 92134 POSTERIOR SEGMENT OCT RETINA (OCULAR COHERENCE TOMOGRAPHY)-BOTH EYES: ICD-10-PCS | Mod: HCNC,S$GLB,, | Performed by: OPHTHALMOLOGY

## 2020-09-28 NOTE — PROGRESS NOTES
HPI     DLS: 05/13/2019 Dr. Bedolla    Patient states her vision has been stable since her last visit. Denies   flashes, floaters, diplopia, photophobia, glare, HA's, or pain.        A/P    1. PPCNV OS  involuted  Monitor    2. PVD OU    3. PCIOL OU    4. Prior angle closure  S/p Sx       Retina PRN  Ok for yearly with Dr. Finney

## 2020-12-01 ENCOUNTER — PATIENT OUTREACH (OUTPATIENT)
Dept: ADMINISTRATIVE | Facility: OTHER | Age: 85
End: 2020-12-01

## 2020-12-01 NOTE — PROGRESS NOTES
Health Maintenance Due   Topic Date Due    TETANUS VACCINE  10/01/1946     Updates were requested from care everywhere.  Chart was reviewed for overdue Proactive [facility] Encounters (BALAJI) topics (CRS, Breast Cancer Screening, Eye exam)  Health Maintenance has been updated.  LINKS immunization registry triggered.  LINKS not responding.

## 2020-12-02 ENCOUNTER — TELEPHONE (OUTPATIENT)
Dept: PODIATRY | Facility: CLINIC | Age: 85
End: 2020-12-02

## 2020-12-02 ENCOUNTER — HOSPITAL ENCOUNTER (OUTPATIENT)
Dept: RADIOLOGY | Facility: HOSPITAL | Age: 85
Discharge: HOME | End: 2020-12-02
Attending: INTERNAL MEDICINE
Payer: MEDICARE

## 2020-12-02 DIAGNOSIS — Z12.31 ENCOUNTER FOR SCREENING MAMMOGRAM FOR MALIGNANT NEOPLASM OF BREAST: ICD-10-CM

## 2020-12-02 PROCEDURE — 77063 MAMMO DIGITAL SCREENING LEFT WITH TOMO: ICD-10-PCS | Mod: 26,HCNC,, | Performed by: RADIOLOGY

## 2020-12-02 PROCEDURE — 77067 SCR MAMMO BI INCL CAD: CPT | Mod: 26,HCNC,, | Performed by: RADIOLOGY

## 2020-12-02 PROCEDURE — 77067 MAMMO DIGITAL SCREENING LEFT WITH TOMO: ICD-10-PCS | Mod: 26,HCNC,, | Performed by: RADIOLOGY

## 2020-12-02 PROCEDURE — 77063 BREAST TOMOSYNTHESIS BI: CPT | Mod: 26,HCNC,, | Performed by: RADIOLOGY

## 2020-12-02 PROCEDURE — 77067 SCR MAMMO BI INCL CAD: CPT | Mod: TC,HCNC,PO

## 2020-12-30 ENCOUNTER — PATIENT MESSAGE (OUTPATIENT)
Dept: PODIATRY | Facility: CLINIC | Age: 85
End: 2020-12-30

## 2020-12-30 ENCOUNTER — TELEPHONE (OUTPATIENT)
Dept: PODIATRY | Facility: CLINIC | Age: 85
End: 2020-12-30

## 2021-01-05 ENCOUNTER — OFFICE VISIT (OUTPATIENT)
Dept: PODIATRY | Facility: CLINIC | Age: 86
End: 2021-01-05
Payer: MEDICARE

## 2021-01-05 ENCOUNTER — PATIENT OUTREACH (OUTPATIENT)
Dept: ADMINISTRATIVE | Facility: OTHER | Age: 86
End: 2021-01-05

## 2021-01-05 VITALS
BODY MASS INDEX: 22.7 KG/M2 | DIASTOLIC BLOOD PRESSURE: 81 MMHG | WEIGHT: 132.94 LBS | HEART RATE: 61 BPM | HEIGHT: 64 IN | SYSTOLIC BLOOD PRESSURE: 198 MMHG

## 2021-01-05 DIAGNOSIS — G60.9 IDIOPATHIC PERIPHERAL NEUROPATHY: ICD-10-CM

## 2021-01-05 DIAGNOSIS — B35.1 ONYCHOMYCOSIS DUE TO DERMATOPHYTE: Primary | ICD-10-CM

## 2021-01-05 DIAGNOSIS — M20.42 HAMMER TOES OF BOTH FEET: ICD-10-CM

## 2021-01-05 DIAGNOSIS — M20.41 HAMMER TOES OF BOTH FEET: ICD-10-CM

## 2021-01-05 DIAGNOSIS — I73.9 PERIPHERAL VASCULAR DISEASE: ICD-10-CM

## 2021-01-05 PROCEDURE — 1159F MED LIST DOCD IN RCRD: CPT | Mod: S$GLB,,, | Performed by: PODIATRIST

## 2021-01-05 PROCEDURE — 3288F FALL RISK ASSESSMENT DOCD: CPT | Mod: CPTII,S$GLB,, | Performed by: PODIATRIST

## 2021-01-05 PROCEDURE — 11721 PR DEBRIDEMENT OF NAILS, 6 OR MORE: ICD-10-PCS | Mod: Q9,HCNC,S$GLB, | Performed by: PODIATRIST

## 2021-01-05 PROCEDURE — 1126F AMNT PAIN NOTED NONE PRSNT: CPT | Mod: S$GLB,,, | Performed by: PODIATRIST

## 2021-01-05 PROCEDURE — 1126F PR PAIN SEVERITY QUANTIFIED, NO PAIN PRESENT: ICD-10-PCS | Mod: S$GLB,,, | Performed by: PODIATRIST

## 2021-01-05 PROCEDURE — 3288F PR FALLS RISK ASSESSMENT DOCUMENTED: ICD-10-PCS | Mod: CPTII,S$GLB,, | Performed by: PODIATRIST

## 2021-01-05 PROCEDURE — 1101F PR PT FALLS ASSESS DOC 0-1 FALLS W/OUT INJ PAST YR: ICD-10-PCS | Mod: CPTII,S$GLB,, | Performed by: PODIATRIST

## 2021-01-05 PROCEDURE — 99499 UNLISTED E&M SERVICE: CPT | Mod: S$GLB,,, | Performed by: PODIATRIST

## 2021-01-05 PROCEDURE — 1101F PT FALLS ASSESS-DOCD LE1/YR: CPT | Mod: CPTII,S$GLB,, | Performed by: PODIATRIST

## 2021-01-05 PROCEDURE — 99213 OFFICE O/P EST LOW 20 MIN: CPT | Mod: 25,HCNC,S$GLB, | Performed by: PODIATRIST

## 2021-01-05 PROCEDURE — 99999 PR PBB SHADOW E&M-EST. PATIENT-LVL IV: CPT | Mod: PBBFAC,HCNC,, | Performed by: PODIATRIST

## 2021-01-05 PROCEDURE — 1159F PR MEDICATION LIST DOCUMENTED IN MEDICAL RECORD: ICD-10-PCS | Mod: S$GLB,,, | Performed by: PODIATRIST

## 2021-01-05 PROCEDURE — 99499 RISK ADDL DX/OHS AUDIT: ICD-10-PCS | Mod: S$GLB,,, | Performed by: PODIATRIST

## 2021-01-05 PROCEDURE — 99213 PR OFFICE/OUTPT VISIT, EST, LEVL III, 20-29 MIN: ICD-10-PCS | Mod: 25,HCNC,S$GLB, | Performed by: PODIATRIST

## 2021-01-05 PROCEDURE — 99999 PR PBB SHADOW E&M-EST. PATIENT-LVL IV: ICD-10-PCS | Mod: PBBFAC,HCNC,, | Performed by: PODIATRIST

## 2021-01-05 PROCEDURE — 11721 DEBRIDE NAIL 6 OR MORE: CPT | Mod: Q9,HCNC,S$GLB, | Performed by: PODIATRIST

## 2021-01-09 ENCOUNTER — IMMUNIZATION (OUTPATIENT)
Dept: FAMILY MEDICINE | Facility: CLINIC | Age: 86
End: 2021-01-09
Payer: MEDICARE

## 2021-01-09 DIAGNOSIS — Z23 NEED FOR VACCINATION: ICD-10-CM

## 2021-01-09 PROCEDURE — 91300 COVID-19, MRNA, LNP-S, PF, 30 MCG/0.3 ML DOSE VACCINE: CPT | Mod: PBBFAC | Performed by: INTERNAL MEDICINE

## 2021-01-30 ENCOUNTER — IMMUNIZATION (OUTPATIENT)
Dept: FAMILY MEDICINE | Facility: CLINIC | Age: 86
End: 2021-01-30
Payer: MEDICARE

## 2021-01-30 DIAGNOSIS — Z23 NEED FOR VACCINATION: Primary | ICD-10-CM

## 2021-01-30 PROCEDURE — 0002A COVID-19, MRNA, LNP-S, PF, 30 MCG/0.3 ML DOSE VACCINE: CPT | Mod: PBBFAC | Performed by: INTERNAL MEDICINE

## 2021-01-30 PROCEDURE — 91300 COVID-19, MRNA, LNP-S, PF, 30 MCG/0.3 ML DOSE VACCINE: CPT | Mod: PBBFAC | Performed by: INTERNAL MEDICINE

## 2021-02-05 ENCOUNTER — PATIENT MESSAGE (OUTPATIENT)
Dept: FAMILY MEDICINE | Facility: CLINIC | Age: 86
End: 2021-02-05

## 2021-02-13 ENCOUNTER — TELEPHONE (OUTPATIENT)
Dept: FAMILY MEDICINE | Facility: CLINIC | Age: 86
End: 2021-02-13

## 2021-02-15 ENCOUNTER — PATIENT MESSAGE (OUTPATIENT)
Dept: FAMILY MEDICINE | Facility: CLINIC | Age: 86
End: 2021-02-15

## 2021-02-15 ENCOUNTER — TELEPHONE (OUTPATIENT)
Dept: FAMILY MEDICINE | Facility: CLINIC | Age: 86
End: 2021-02-15

## 2021-02-15 ENCOUNTER — OFFICE VISIT (OUTPATIENT)
Dept: FAMILY MEDICINE | Facility: CLINIC | Age: 86
End: 2021-02-15
Payer: MEDICARE

## 2021-02-15 DIAGNOSIS — I73.9 PERIPHERAL VASCULAR DISEASE: ICD-10-CM

## 2021-02-15 DIAGNOSIS — G60.9 IDIOPATHIC PERIPHERAL NEUROPATHY: Primary | ICD-10-CM

## 2021-02-15 DIAGNOSIS — Z91.81 HISTORY OF RECENT FALL: ICD-10-CM

## 2021-02-15 DIAGNOSIS — C50.911 MALIGNANT NEOPLASM OF RIGHT FEMALE BREAST, UNSPECIFIED ESTROGEN RECEPTOR STATUS, UNSPECIFIED SITE OF BREAST: ICD-10-CM

## 2021-02-15 DIAGNOSIS — Z91.81 HISTORY OF RECENT FALL: Primary | ICD-10-CM

## 2021-02-15 DIAGNOSIS — R53.81 PHYSICAL DEBILITY: ICD-10-CM

## 2021-02-15 PROCEDURE — 99443 PR PHYSICIAN TELEPHONE EVALUATION 21-30 MIN: CPT | Mod: 95,,, | Performed by: INTERNAL MEDICINE

## 2021-02-15 PROCEDURE — 99443 PR PHYSICIAN TELEPHONE EVALUATION 21-30 MIN: ICD-10-PCS | Mod: 95,,, | Performed by: INTERNAL MEDICINE

## 2021-02-17 ENCOUNTER — OFFICE VISIT (OUTPATIENT)
Dept: FAMILY MEDICINE | Facility: CLINIC | Age: 86
End: 2021-02-17
Payer: MEDICARE

## 2021-02-17 ENCOUNTER — PATIENT MESSAGE (OUTPATIENT)
Dept: FAMILY MEDICINE | Facility: CLINIC | Age: 86
End: 2021-02-17

## 2021-02-17 VITALS
WEIGHT: 139.75 LBS | SYSTOLIC BLOOD PRESSURE: 124 MMHG | OXYGEN SATURATION: 100 % | HEART RATE: 59 BPM | BODY MASS INDEX: 23.99 KG/M2 | DIASTOLIC BLOOD PRESSURE: 72 MMHG
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
## 2021-02-17 DIAGNOSIS — E03.9 HYPOTHYROIDISM, UNSPECIFIED TYPE: ICD-10-CM

## 2021-02-17 DIAGNOSIS — I10 ESSENTIAL HYPERTENSION: ICD-10-CM

## 2021-02-17 DIAGNOSIS — F41.9 ANXIETY: ICD-10-CM

## 2021-02-17 DIAGNOSIS — Z48.02 VISIT FOR SUTURE REMOVAL: ICD-10-CM

## 2021-02-17 DIAGNOSIS — N39.41 URGE INCONTINENCE: ICD-10-CM

## 2021-02-17 LAB
BACTERIA #/AREA URNS HPF: NORMAL /HPF
BILIRUB UR QL STRIP: NEGATIVE
CLARITY UR: CLEAR
COLOR UR: YELLOW
GLUCOSE UR QL STRIP: NEGATIVE
HGB UR QL STRIP: NEGATIVE
KETONES UR QL STRIP: NEGATIVE
LEUKOCYTE ESTERASE UR QL STRIP: ABNORMAL
MICROSCOPIC COMMENT: NORMAL
NITRITE UR QL STRIP: NEGATIVE
PH UR STRIP: 7 [PH] (ref 5–8)
PROT UR QL STRIP: NEGATIVE
SP GR UR STRIP: 1.01 (ref 1–1.03)
URN SPEC COLLECT METH UR: ABNORMAL
WBC #/AREA URNS HPF: 3 /HPF (ref 0–5)

## 2021-02-17 PROCEDURE — 99214 PR OFFICE/OUTPT VISIT, EST, LEVL IV, 30-39 MIN: ICD-10-PCS | Mod: S$GLB,,, | Performed by: NURSE PRACTITIONER

## 2021-02-17 PROCEDURE — 1100F PTFALLS ASSESS-DOCD GE2>/YR: CPT | Mod: CPTII,S$GLB,, | Performed by: NURSE PRACTITIONER

## 2021-02-17 PROCEDURE — 3288F PR FALLS RISK ASSESSMENT DOCUMENTED: ICD-10-PCS | Mod: CPTII,S$GLB,, | Performed by: NURSE PRACTITIONER

## 2021-02-17 PROCEDURE — 99999 PR PBB SHADOW E&M-EST. PATIENT-LVL III: ICD-10-PCS | Mod: PBBFAC,,, | Performed by: NURSE PRACTITIONER

## 2021-02-17 PROCEDURE — 1100F PR PT FALLS ASSESS DOC 2+ FALLS/FALL W/INJURY/YR: ICD-10-PCS | Mod: CPTII,S$GLB,, | Performed by: NURSE PRACTITIONER

## 2021-02-17 PROCEDURE — 99999 PR PBB SHADOW E&M-EST. PATIENT-LVL III: CPT | Mod: PBBFAC,,, | Performed by: NURSE PRACTITIONER

## 2021-02-17 PROCEDURE — 1159F PR MEDICATION LIST DOCUMENTED IN MEDICAL RECORD: ICD-10-PCS | Mod: S$GLB,,, | Performed by: NURSE PRACTITIONER

## 2021-02-17 PROCEDURE — 99214 OFFICE O/P EST MOD 30 MIN: CPT | Mod: S$GLB,,, | Performed by: NURSE PRACTITIONER

## 2021-02-17 PROCEDURE — 3288F FALL RISK ASSESSMENT DOCD: CPT | Mod: CPTII,S$GLB,, | Performed by: NURSE PRACTITIONER

## 2021-02-17 PROCEDURE — 81000 URINALYSIS NONAUTO W/SCOPE: CPT | Mod: PO

## 2021-02-17 PROCEDURE — 87086 URINE CULTURE/COLONY COUNT: CPT

## 2021-02-17 PROCEDURE — 1159F MED LIST DOCD IN RCRD: CPT | Mod: S$GLB,,, | Performed by: NURSE PRACTITIONER

## 2021-02-17 RX ORDER — LEVOTHYROXINE SODIUM 75 UG/1
75 TABLET ORAL DAILY
Qty: 90 TABLET | Refills: 1 | Status: SHIPPED | OUTPATIENT
Start: 2021-02-17 | End: 2021-09-11

## 2021-02-17 RX ORDER — ESCITALOPRAM OXALATE 20 MG/1
20 TABLET ORAL DAILY
Qty: 90 TABLET | Refills: 1 | Status: SHIPPED | OUTPATIENT
Start: 2021-02-17 | End: 2021-10-19

## 2021-02-17 RX ORDER — LOSARTAN POTASSIUM 100 MG/1
100 TABLET ORAL DAILY
Qty: 90 TABLET | Refills: 1 | Status: SHIPPED | OUTPATIENT
Start: 2021-02-17

## 2021-02-17 RX ORDER — LOSARTAN POTASSIUM 100 MG/1
100 TABLET ORAL DAILY
COMMUNITY
Start: 2020-11-28 | End: 2021-02-17 | Stop reason: SDUPTHER

## 2021-02-18 ENCOUNTER — PATIENT MESSAGE (OUTPATIENT)
Dept: FAMILY MEDICINE | Facility: CLINIC | Age: 86
End: 2021-02-18

## 2021-02-18 DIAGNOSIS — E03.9 HYPOTHYROIDISM, UNSPECIFIED TYPE: ICD-10-CM

## 2021-02-18 DIAGNOSIS — D64.9 ANEMIA, UNSPECIFIED TYPE: Primary | ICD-10-CM

## 2021-02-18 DIAGNOSIS — I10 ESSENTIAL HYPERTENSION: ICD-10-CM

## 2021-02-19 ENCOUNTER — TELEPHONE (OUTPATIENT)
Dept: FAMILY MEDICINE | Facility: CLINIC | Age: 86
End: 2021-02-19

## 2021-02-19 LAB
BACTERIA UR CULT: NORMAL
BACTERIA UR CULT: NORMAL

## 2021-02-23 ENCOUNTER — PATIENT MESSAGE (OUTPATIENT)
Dept: FAMILY MEDICINE | Facility: CLINIC | Age: 86
End: 2021-02-23

## 2021-02-23 DIAGNOSIS — I10 ESSENTIAL HYPERTENSION: Primary | ICD-10-CM

## 2021-02-23 DIAGNOSIS — E03.9 HYPOTHYROIDISM, UNSPECIFIED TYPE: ICD-10-CM

## 2021-02-24 ENCOUNTER — LAB VISIT (OUTPATIENT)
Dept: LAB | Facility: HOSPITAL | Age: 86
End: 2021-02-24
Attending: INTERNAL MEDICINE
Payer: MEDICARE

## 2021-02-24 DIAGNOSIS — E03.9 HYPOTHYROIDISM, UNSPECIFIED TYPE: ICD-10-CM

## 2021-02-24 DIAGNOSIS — I10 ESSENTIAL HYPERTENSION: ICD-10-CM

## 2021-02-24 DIAGNOSIS — D64.9 ANEMIA, UNSPECIFIED TYPE: ICD-10-CM

## 2021-02-24 PROCEDURE — 82728 ASSAY OF FERRITIN: CPT

## 2021-02-24 PROCEDURE — 84443 ASSAY THYROID STIM HORMONE: CPT

## 2021-02-24 PROCEDURE — 85025 COMPLETE CBC W/AUTO DIFF WBC: CPT

## 2021-02-24 PROCEDURE — 80053 COMPREHEN METABOLIC PANEL: CPT

## 2021-02-24 PROCEDURE — 36415 COLL VENOUS BLD VENIPUNCTURE: CPT | Mod: PO

## 2021-02-24 PROCEDURE — 83540 ASSAY OF IRON: CPT

## 2021-02-24 PROCEDURE — 84439 ASSAY OF FREE THYROXINE: CPT

## 2021-02-25 ENCOUNTER — PES CALL (OUTPATIENT)
Dept: ADMINISTRATIVE | Facility: CLINIC | Age: 86
End: 2021-02-25

## 2021-02-25 LAB
ALBUMIN SERPL BCP-MCNC: 3.4 G/DL (ref 3.5–5.2)
ALP SERPL-CCNC: 83 U/L (ref 55–135)
ALT SERPL W/O P-5'-P-CCNC: 11 U/L (ref 10–44)
ANION GAP SERPL CALC-SCNC: 9 MMOL/L (ref 8–16)
ANION GAP SERPL CALC-SCNC: 9 MMOL/L (ref 8–16)
AST SERPL-CCNC: 18 U/L (ref 10–40)
BASOPHILS # BLD AUTO: 0.06 K/UL (ref 0–0.2)
BASOPHILS NFR BLD: 1 % (ref 0–1.9)
BILIRUB SERPL-MCNC: 0.5 MG/DL (ref 0.1–1)
BUN SERPL-MCNC: 18 MG/DL (ref 10–30)
BUN SERPL-MCNC: 18 MG/DL (ref 10–30)
CALCIUM SERPL-MCNC: 8.8 MG/DL (ref 8.7–10.5)
CALCIUM SERPL-MCNC: 8.8 MG/DL (ref 8.7–10.5)
CHLORIDE SERPL-SCNC: 106 MMOL/L (ref 95–110)
CHLORIDE SERPL-SCNC: 106 MMOL/L (ref 95–110)
CO2 SERPL-SCNC: 27 MMOL/L (ref 23–29)
CO2 SERPL-SCNC: 27 MMOL/L (ref 23–29)
CREAT SERPL-MCNC: 0.9 MG/DL (ref 0.5–1.4)
CREAT SERPL-MCNC: 0.9 MG/DL (ref 0.5–1.4)
DIFFERENTIAL METHOD: ABNORMAL
EOSINOPHIL # BLD AUTO: 0.3 K/UL (ref 0–0.5)
EOSINOPHIL NFR BLD: 5.6 % (ref 0–8)
ERYTHROCYTE [DISTWIDTH] IN BLOOD BY AUTOMATED COUNT: 14.5 % (ref 11.5–14.5)
EST. GFR  (AFRICAN AMERICAN): >60 ML/MIN/1.73 M^2
EST. GFR  (AFRICAN AMERICAN): >60 ML/MIN/1.73 M^2
EST. GFR  (NON AFRICAN AMERICAN): 55.7 ML/MIN/1.73 M^2
EST. GFR  (NON AFRICAN AMERICAN): 55.7 ML/MIN/1.73 M^2
FERRITIN SERPL-MCNC: 45 NG/ML (ref 20–300)
GLUCOSE SERPL-MCNC: 109 MG/DL (ref 70–110)
GLUCOSE SERPL-MCNC: 109 MG/DL (ref 70–110)
HCT VFR BLD AUTO: 35 % (ref 37–48.5)
HGB BLD-MCNC: 11 G/DL (ref 12–16)
IMM GRANULOCYTES # BLD AUTO: 0.03 K/UL (ref 0–0.04)
IMM GRANULOCYTES NFR BLD AUTO: 0.5 % (ref 0–0.5)
IRON SERPL-MCNC: 49 UG/DL (ref 30–160)
LYMPHOCYTES # BLD AUTO: 2 K/UL (ref 1–4.8)
LYMPHOCYTES NFR BLD: 33.1 % (ref 18–48)
MCH RBC QN AUTO: 30.2 PG (ref 27–31)
MCHC RBC AUTO-ENTMCNC: 31.4 G/DL (ref 32–36)
MCV RBC AUTO: 96 FL (ref 82–98)
MONOCYTES # BLD AUTO: 0.9 K/UL (ref 0.3–1)
MONOCYTES NFR BLD: 14.8 % (ref 4–15)
NEUTROPHILS # BLD AUTO: 2.7 K/UL (ref 1.8–7.7)
NEUTROPHILS NFR BLD: 45 % (ref 38–73)
NRBC BLD-RTO: 0 /100 WBC
PLATELET # BLD AUTO: 320 K/UL (ref 150–350)
PMV BLD AUTO: 10.4 FL (ref 9.2–12.9)
POTASSIUM SERPL-SCNC: 4.3 MMOL/L (ref 3.5–5.1)
POTASSIUM SERPL-SCNC: 4.3 MMOL/L (ref 3.5–5.1)
PROT SERPL-MCNC: 6.2 G/DL (ref 6–8.4)
RBC # BLD AUTO: 3.64 M/UL (ref 4–5.4)
SATURATED IRON: 15 % (ref 20–50)
SODIUM SERPL-SCNC: 142 MMOL/L (ref 136–145)
SODIUM SERPL-SCNC: 142 MMOL/L (ref 136–145)
T4 FREE SERPL-MCNC: 0.98 NG/DL (ref 0.71–1.51)
TOTAL IRON BINDING CAPACITY: 323 UG/DL (ref 250–450)
TRANSFERRIN SERPL-MCNC: 218 MG/DL (ref 200–375)
TSH SERPL DL<=0.005 MIU/L-ACNC: 1.25 UIU/ML (ref 0.4–4)
WBC # BLD AUTO: 5.89 K/UL (ref 3.9–12.7)

## 2021-03-08 ENCOUNTER — PATIENT MESSAGE (OUTPATIENT)
Dept: FAMILY MEDICINE | Facility: CLINIC | Age: 86
End: 2021-03-08

## 2021-03-09 ENCOUNTER — OFFICE VISIT (OUTPATIENT)
Dept: FAMILY MEDICINE | Facility: CLINIC | Age: 86
End: 2021-03-09
Payer: MEDICARE

## 2021-03-09 VITALS
TEMPERATURE: 99 F | SYSTOLIC BLOOD PRESSURE: 124 MMHG | HEIGHT: 64 IN | DIASTOLIC BLOOD PRESSURE: 70 MMHG | WEIGHT: 143.75 LBS | OXYGEN SATURATION: 97 % | HEART RATE: 62 BPM | BODY MASS INDEX: 24.54 KG/M2

## 2021-03-09 DIAGNOSIS — R29.6 FREQUENT FALLS: Primary | ICD-10-CM

## 2021-03-09 DIAGNOSIS — N39.41 URGE INCONTINENCE: ICD-10-CM

## 2021-03-09 DIAGNOSIS — N18.31 STAGE 3A CHRONIC KIDNEY DISEASE: ICD-10-CM

## 2021-03-09 PROCEDURE — 99499 UNLISTED E&M SERVICE: CPT | Mod: S$GLB,,, | Performed by: INTERNAL MEDICINE

## 2021-03-09 PROCEDURE — 99214 PR OFFICE/OUTPT VISIT, EST, LEVL IV, 30-39 MIN: ICD-10-PCS | Mod: S$GLB,,, | Performed by: INTERNAL MEDICINE

## 2021-03-09 PROCEDURE — 1126F AMNT PAIN NOTED NONE PRSNT: CPT | Mod: S$GLB,,, | Performed by: INTERNAL MEDICINE

## 2021-03-09 PROCEDURE — 99999 PR PBB SHADOW E&M-EST. PATIENT-LVL V: CPT | Mod: PBBFAC,,, | Performed by: INTERNAL MEDICINE

## 2021-03-09 PROCEDURE — 1101F PR PT FALLS ASSESS DOC 0-1 FALLS W/OUT INJ PAST YR: ICD-10-PCS | Mod: CPTII,S$GLB,, | Performed by: INTERNAL MEDICINE

## 2021-03-09 PROCEDURE — 1126F PR PAIN SEVERITY QUANTIFIED, NO PAIN PRESENT: ICD-10-PCS | Mod: S$GLB,,, | Performed by: INTERNAL MEDICINE

## 2021-03-09 PROCEDURE — 1159F PR MEDICATION LIST DOCUMENTED IN MEDICAL RECORD: ICD-10-PCS | Mod: S$GLB,,, | Performed by: INTERNAL MEDICINE

## 2021-03-09 PROCEDURE — 99999 PR PBB SHADOW E&M-EST. PATIENT-LVL V: ICD-10-PCS | Mod: PBBFAC,,, | Performed by: INTERNAL MEDICINE

## 2021-03-09 PROCEDURE — 1159F MED LIST DOCD IN RCRD: CPT | Mod: S$GLB,,, | Performed by: INTERNAL MEDICINE

## 2021-03-09 PROCEDURE — 3288F PR FALLS RISK ASSESSMENT DOCUMENTED: ICD-10-PCS | Mod: CPTII,S$GLB,, | Performed by: INTERNAL MEDICINE

## 2021-03-09 PROCEDURE — 99214 OFFICE O/P EST MOD 30 MIN: CPT | Mod: S$GLB,,, | Performed by: INTERNAL MEDICINE

## 2021-03-09 PROCEDURE — 3288F FALL RISK ASSESSMENT DOCD: CPT | Mod: CPTII,S$GLB,, | Performed by: INTERNAL MEDICINE

## 2021-03-09 PROCEDURE — 1101F PT FALLS ASSESS-DOCD LE1/YR: CPT | Mod: CPTII,S$GLB,, | Performed by: INTERNAL MEDICINE

## 2021-03-09 PROCEDURE — 99499 RISK ADDL DX/OHS AUDIT: ICD-10-PCS | Mod: S$GLB,,, | Performed by: INTERNAL MEDICINE

## 2021-03-11 ENCOUNTER — TELEPHONE (OUTPATIENT)
Dept: FAMILY MEDICINE | Facility: CLINIC | Age: 86
End: 2021-03-11

## 2021-03-16 PROBLEM — N18.31 STAGE 3A CHRONIC KIDNEY DISEASE: Status: ACTIVE | Noted: 2021-03-16

## 2021-03-29 ENCOUNTER — PATIENT OUTREACH (OUTPATIENT)
Dept: ADMINISTRATIVE | Facility: OTHER | Age: 86
End: 2021-03-29

## 2021-03-30 ENCOUNTER — OFFICE VISIT (OUTPATIENT)
Dept: NEUROLOGY | Facility: CLINIC | Age: 86
End: 2021-03-30
Payer: MEDICARE

## 2021-03-30 VITALS
BODY MASS INDEX: 24.32 KG/M2 | WEIGHT: 142.44 LBS | HEIGHT: 64 IN | TEMPERATURE: 97 F | DIASTOLIC BLOOD PRESSURE: 69 MMHG | HEART RATE: 52 BPM | SYSTOLIC BLOOD PRESSURE: 157 MMHG

## 2021-03-30 DIAGNOSIS — I10 ESSENTIAL HYPERTENSION: ICD-10-CM

## 2021-03-30 DIAGNOSIS — I61.9 BRAIN BLEED: ICD-10-CM

## 2021-03-30 PROBLEM — S06.5XAA POST-TRAUMATIC SUBDURAL HEMATOMA: Status: ACTIVE | Noted: 2021-03-30

## 2021-03-30 PROCEDURE — 99999 PR PBB SHADOW E&M-EST. PATIENT-LVL V: ICD-10-PCS | Mod: PBBFAC,,, | Performed by: NURSE PRACTITIONER

## 2021-03-30 PROCEDURE — 3288F FALL RISK ASSESSMENT DOCD: CPT | Mod: CPTII,S$GLB,, | Performed by: NURSE PRACTITIONER

## 2021-03-30 PROCEDURE — 99205 OFFICE O/P NEW HI 60 MIN: CPT | Mod: S$GLB,,, | Performed by: NURSE PRACTITIONER

## 2021-03-30 PROCEDURE — 1100F PR PT FALLS ASSESS DOC 2+ FALLS/FALL W/INJURY/YR: ICD-10-PCS | Mod: CPTII,S$GLB,, | Performed by: NURSE PRACTITIONER

## 2021-03-30 PROCEDURE — 1159F MED LIST DOCD IN RCRD: CPT | Mod: S$GLB,,, | Performed by: NURSE PRACTITIONER

## 2021-03-30 PROCEDURE — 1159F PR MEDICATION LIST DOCUMENTED IN MEDICAL RECORD: ICD-10-PCS | Mod: S$GLB,,, | Performed by: NURSE PRACTITIONER

## 2021-03-30 PROCEDURE — 99205 PR OFFICE/OUTPT VISIT, NEW, LEVL V, 60-74 MIN: ICD-10-PCS | Mod: S$GLB,,, | Performed by: NURSE PRACTITIONER

## 2021-03-30 PROCEDURE — 1100F PTFALLS ASSESS-DOCD GE2>/YR: CPT | Mod: CPTII,S$GLB,, | Performed by: NURSE PRACTITIONER

## 2021-03-30 PROCEDURE — 99999 PR PBB SHADOW E&M-EST. PATIENT-LVL V: CPT | Mod: PBBFAC,,, | Performed by: NURSE PRACTITIONER

## 2021-03-30 PROCEDURE — 1126F PR PAIN SEVERITY QUANTIFIED, NO PAIN PRESENT: ICD-10-PCS | Mod: S$GLB,,, | Performed by: NURSE PRACTITIONER

## 2021-03-30 PROCEDURE — 3288F PR FALLS RISK ASSESSMENT DOCUMENTED: ICD-10-PCS | Mod: CPTII,S$GLB,, | Performed by: NURSE PRACTITIONER

## 2021-03-30 PROCEDURE — 1126F AMNT PAIN NOTED NONE PRSNT: CPT | Mod: S$GLB,,, | Performed by: NURSE PRACTITIONER

## 2021-04-05 ENCOUNTER — OFFICE VISIT (OUTPATIENT)
Dept: PODIATRY | Facility: CLINIC | Age: 86
End: 2021-04-05
Payer: MEDICARE

## 2021-04-05 ENCOUNTER — HOSPITAL ENCOUNTER (OUTPATIENT)
Dept: RADIOLOGY | Facility: HOSPITAL | Age: 86
Discharge: HOME | End: 2021-04-05
Attending: NURSE PRACTITIONER
Payer: MEDICARE

## 2021-04-05 VITALS — HEIGHT: 64 IN | WEIGHT: 142.44 LBS | BODY MASS INDEX: 24.32 KG/M2

## 2021-04-05 DIAGNOSIS — M20.41 HAMMER TOES OF BOTH FEET: ICD-10-CM

## 2021-04-05 DIAGNOSIS — B35.1 ONYCHOMYCOSIS DUE TO DERMATOPHYTE: ICD-10-CM

## 2021-04-05 DIAGNOSIS — I61.9 BRAIN BLEED: ICD-10-CM

## 2021-04-05 DIAGNOSIS — I73.9 PERIPHERAL VASCULAR DISEASE: Primary | ICD-10-CM

## 2021-04-05 DIAGNOSIS — M20.42 HAMMER TOES OF BOTH FEET: ICD-10-CM

## 2021-04-05 DIAGNOSIS — G60.9 IDIOPATHIC PERIPHERAL NEUROPATHY: ICD-10-CM

## 2021-04-05 PROCEDURE — 99499 UNLISTED E&M SERVICE: CPT | Mod: S$GLB,,, | Performed by: PODIATRIST

## 2021-04-05 PROCEDURE — 11721 DEBRIDE NAIL 6 OR MORE: CPT | Mod: Q9,S$GLB,, | Performed by: PODIATRIST

## 2021-04-05 PROCEDURE — 1101F PT FALLS ASSESS-DOCD LE1/YR: CPT | Mod: CPTII,S$GLB,, | Performed by: PODIATRIST

## 2021-04-05 PROCEDURE — 99499 NO LOS: ICD-10-PCS | Mod: S$GLB,,, | Performed by: PODIATRIST

## 2021-04-05 PROCEDURE — 99999 PR PBB SHADOW E&M-EST. PATIENT-LVL III: ICD-10-PCS | Mod: PBBFAC,,, | Performed by: PODIATRIST

## 2021-04-05 PROCEDURE — 70450 CT HEAD WITHOUT CONTRAST: ICD-10-PCS | Mod: 26,,, | Performed by: RADIOLOGY

## 2021-04-05 PROCEDURE — 99999 PR PBB SHADOW E&M-EST. PATIENT-LVL III: CPT | Mod: PBBFAC,,, | Performed by: PODIATRIST

## 2021-04-05 PROCEDURE — 70450 CT HEAD/BRAIN W/O DYE: CPT | Mod: TC,PO

## 2021-04-05 PROCEDURE — 1101F PR PT FALLS ASSESS DOC 0-1 FALLS W/OUT INJ PAST YR: ICD-10-PCS | Mod: CPTII,S$GLB,, | Performed by: PODIATRIST

## 2021-04-05 PROCEDURE — 11721 PR DEBRIDEMENT OF NAILS, 6 OR MORE: ICD-10-PCS | Mod: Q9,S$GLB,, | Performed by: PODIATRIST

## 2021-04-05 PROCEDURE — 1126F AMNT PAIN NOTED NONE PRSNT: CPT | Mod: S$GLB,,, | Performed by: PODIATRIST

## 2021-04-05 PROCEDURE — 3288F FALL RISK ASSESSMENT DOCD: CPT | Mod: CPTII,S$GLB,, | Performed by: PODIATRIST

## 2021-04-05 PROCEDURE — 3288F PR FALLS RISK ASSESSMENT DOCUMENTED: ICD-10-PCS | Mod: CPTII,S$GLB,, | Performed by: PODIATRIST

## 2021-04-05 PROCEDURE — 1126F PR PAIN SEVERITY QUANTIFIED, NO PAIN PRESENT: ICD-10-PCS | Mod: S$GLB,,, | Performed by: PODIATRIST

## 2021-04-05 PROCEDURE — 70450 CT HEAD/BRAIN W/O DYE: CPT | Mod: 26,,, | Performed by: RADIOLOGY

## 2021-04-15 ENCOUNTER — TELEPHONE (OUTPATIENT)
Dept: FAMILY MEDICINE | Facility: CLINIC | Age: 86
End: 2021-04-15

## 2021-04-20 ENCOUNTER — TELEPHONE (OUTPATIENT)
Dept: FAMILY MEDICINE | Facility: CLINIC | Age: 86
End: 2021-04-20

## 2021-04-28 ENCOUNTER — TELEPHONE (OUTPATIENT)
Dept: FAMILY MEDICINE | Facility: CLINIC | Age: 86
End: 2021-04-28

## 2021-05-03 ENCOUNTER — OFFICE VISIT (OUTPATIENT)
Dept: HOME HEALTH SERVICES | Facility: CLINIC | Age: 86
End: 2021-05-03
Payer: MEDICARE

## 2021-05-03 VITALS
BODY MASS INDEX: 23.56 KG/M2 | HEART RATE: 59 BPM | DIASTOLIC BLOOD PRESSURE: 72 MMHG | SYSTOLIC BLOOD PRESSURE: 153 MMHG | WEIGHT: 138 LBS | HEIGHT: 64 IN

## 2021-05-03 DIAGNOSIS — I77.9 CAROTID ARTERY DISEASE, UNSPECIFIED LATERALITY, UNSPECIFIED TYPE: ICD-10-CM

## 2021-05-03 DIAGNOSIS — I10 ESSENTIAL HYPERTENSION: ICD-10-CM

## 2021-05-03 DIAGNOSIS — N39.46 MIXED INCONTINENCE URGE AND STRESS: ICD-10-CM

## 2021-05-03 DIAGNOSIS — G60.9 IDIOPATHIC PERIPHERAL NEUROPATHY: ICD-10-CM

## 2021-05-03 DIAGNOSIS — I73.9 PERIPHERAL VASCULAR DISEASE: ICD-10-CM

## 2021-05-03 DIAGNOSIS — Z00.00 ENCOUNTER FOR PREVENTIVE HEALTH EXAMINATION: Primary | ICD-10-CM

## 2021-05-03 DIAGNOSIS — Z86.79 HISTORY OF SUBDURAL HEMATOMA: ICD-10-CM

## 2021-05-03 DIAGNOSIS — Z85.3 HISTORY OF BREAST CANCER: ICD-10-CM

## 2021-05-03 DIAGNOSIS — I70.0 AORTIC ATHEROSCLEROSIS: ICD-10-CM

## 2021-05-03 DIAGNOSIS — Z85.43 HISTORY OF OVARIAN CANCER: ICD-10-CM

## 2021-05-03 DIAGNOSIS — N18.31 STAGE 3A CHRONIC KIDNEY DISEASE: ICD-10-CM

## 2021-05-03 PROBLEM — S06.5XAA POST-TRAUMATIC SUBDURAL HEMATOMA: Status: RESOLVED | Noted: 2021-03-30 | Resolved: 2021-05-03

## 2021-05-03 PROCEDURE — 1100F PR PT FALLS ASSESS DOC 2+ FALLS/FALL W/INJURY/YR: ICD-10-PCS | Mod: CPTII,S$GLB,, | Performed by: NURSE PRACTITIONER

## 2021-05-03 PROCEDURE — 1158F PR ADVANCE CARE PLANNING DISCUSS DOCUMENTED IN MEDICAL RECORD: ICD-10-PCS | Mod: S$GLB,,, | Performed by: NURSE PRACTITIONER

## 2021-05-03 PROCEDURE — G0439 PPPS, SUBSEQ VISIT: HCPCS | Mod: S$GLB,,, | Performed by: NURSE PRACTITIONER

## 2021-05-03 PROCEDURE — G0439 PR MEDICARE ANNUAL WELLNESS SUBSEQUENT VISIT: ICD-10-PCS | Mod: S$GLB,,, | Performed by: NURSE PRACTITIONER

## 2021-05-03 PROCEDURE — 3288F FALL RISK ASSESSMENT DOCD: CPT | Mod: CPTII,S$GLB,, | Performed by: NURSE PRACTITIONER

## 2021-05-03 PROCEDURE — 1158F ADVNC CARE PLAN TLK DOCD: CPT | Mod: S$GLB,,, | Performed by: NURSE PRACTITIONER

## 2021-05-03 PROCEDURE — 1100F PTFALLS ASSESS-DOCD GE2>/YR: CPT | Mod: CPTII,S$GLB,, | Performed by: NURSE PRACTITIONER

## 2021-05-03 PROCEDURE — 3288F PR FALLS RISK ASSESSMENT DOCUMENTED: ICD-10-PCS | Mod: CPTII,S$GLB,, | Performed by: NURSE PRACTITIONER

## 2021-05-06 ENCOUNTER — TELEPHONE (OUTPATIENT)
Dept: FAMILY MEDICINE | Facility: CLINIC | Age: 86
End: 2021-05-06

## 2021-05-20 ENCOUNTER — TELEPHONE (OUTPATIENT)
Dept: FAMILY MEDICINE | Facility: CLINIC | Age: 86
End: 2021-05-20

## 2021-06-01 ENCOUNTER — TELEPHONE (OUTPATIENT)
Dept: DERMATOLOGY | Facility: CLINIC | Age: 86
End: 2021-06-01

## 2021-07-06 ENCOUNTER — OFFICE VISIT (OUTPATIENT)
Dept: PODIATRY | Facility: CLINIC | Age: 86
End: 2021-07-06
Payer: MEDICARE

## 2021-07-06 ENCOUNTER — OFFICE VISIT (OUTPATIENT)
Dept: DERMATOLOGY | Facility: CLINIC | Age: 86
End: 2021-07-06
Payer: MEDICARE

## 2021-07-06 VITALS — BODY MASS INDEX: 23.56 KG/M2 | RESPIRATION RATE: 18 BRPM | HEIGHT: 64 IN | WEIGHT: 138 LBS

## 2021-07-06 VITALS — WEIGHT: 138 LBS | BODY MASS INDEX: 23.56 KG/M2 | HEIGHT: 64 IN

## 2021-07-06 DIAGNOSIS — M67.442 DIGITAL MUCOUS CYST OF FINGER OF LEFT HAND: ICD-10-CM

## 2021-07-06 DIAGNOSIS — L82.1 SEBORRHEIC KERATOSES: Primary | ICD-10-CM

## 2021-07-06 DIAGNOSIS — M20.41 HAMMER TOES OF BOTH FEET: ICD-10-CM

## 2021-07-06 DIAGNOSIS — B35.1 ONYCHOMYCOSIS DUE TO DERMATOPHYTE: ICD-10-CM

## 2021-07-06 DIAGNOSIS — D18.01 CHERRY ANGIOMA: ICD-10-CM

## 2021-07-06 DIAGNOSIS — G60.9 IDIOPATHIC PERIPHERAL NEUROPATHY: ICD-10-CM

## 2021-07-06 DIAGNOSIS — I73.9 PERIPHERAL VASCULAR DISEASE: Primary | ICD-10-CM

## 2021-07-06 DIAGNOSIS — M20.42 HAMMER TOES OF BOTH FEET: ICD-10-CM

## 2021-07-06 PROCEDURE — 1126F PR PAIN SEVERITY QUANTIFIED, NO PAIN PRESENT: ICD-10-PCS | Mod: S$GLB,,, | Performed by: DERMATOLOGY

## 2021-07-06 PROCEDURE — 1100F PTFALLS ASSESS-DOCD GE2>/YR: CPT | Mod: CPTII,S$GLB,, | Performed by: DERMATOLOGY

## 2021-07-06 PROCEDURE — 3288F FALL RISK ASSESSMENT DOCD: CPT | Mod: CPTII,S$GLB,, | Performed by: PODIATRIST

## 2021-07-06 PROCEDURE — 1159F MED LIST DOCD IN RCRD: CPT | Mod: S$GLB,,, | Performed by: DERMATOLOGY

## 2021-07-06 PROCEDURE — 99999 PR PBB SHADOW E&M-EST. PATIENT-LVL III: ICD-10-PCS | Mod: PBBFAC,,, | Performed by: DERMATOLOGY

## 2021-07-06 PROCEDURE — 99203 OFFICE O/P NEW LOW 30 MIN: CPT | Mod: S$GLB,,, | Performed by: DERMATOLOGY

## 2021-07-06 PROCEDURE — 99999 PR PBB SHADOW E&M-EST. PATIENT-LVL III: CPT | Mod: PBBFAC,,, | Performed by: PODIATRIST

## 2021-07-06 PROCEDURE — 1101F PT FALLS ASSESS-DOCD LE1/YR: CPT | Mod: CPTII,S$GLB,, | Performed by: PODIATRIST

## 2021-07-06 PROCEDURE — 99999 PR PBB SHADOW E&M-EST. PATIENT-LVL III: CPT | Mod: PBBFAC,,, | Performed by: DERMATOLOGY

## 2021-07-06 PROCEDURE — 3288F FALL RISK ASSESSMENT DOCD: CPT | Mod: CPTII,S$GLB,, | Performed by: DERMATOLOGY

## 2021-07-06 PROCEDURE — 99203 PR OFFICE/OUTPT VISIT, NEW, LEVL III, 30-44 MIN: ICD-10-PCS | Mod: S$GLB,,, | Performed by: DERMATOLOGY

## 2021-07-06 PROCEDURE — 99499 UNLISTED E&M SERVICE: CPT | Mod: HCNC,S$GLB,, | Performed by: PODIATRIST

## 2021-07-06 PROCEDURE — 99499 UNLISTED E&M SERVICE: CPT | Mod: S$GLB,,, | Performed by: PODIATRIST

## 2021-07-06 PROCEDURE — 3288F PR FALLS RISK ASSESSMENT DOCUMENTED: ICD-10-PCS | Mod: CPTII,S$GLB,, | Performed by: PODIATRIST

## 2021-07-06 PROCEDURE — 1126F AMNT PAIN NOTED NONE PRSNT: CPT | Mod: S$GLB,,, | Performed by: PODIATRIST

## 2021-07-06 PROCEDURE — 1126F AMNT PAIN NOTED NONE PRSNT: CPT | Mod: S$GLB,,, | Performed by: DERMATOLOGY

## 2021-07-06 PROCEDURE — 11721 DEBRIDE NAIL 6 OR MORE: CPT | Mod: Q9,S$GLB,, | Performed by: PODIATRIST

## 2021-07-06 PROCEDURE — 99499 RISK ADDL DX/OHS AUDIT: ICD-10-PCS | Mod: HCNC,S$GLB,, | Performed by: PODIATRIST

## 2021-07-06 PROCEDURE — 1126F PR PAIN SEVERITY QUANTIFIED, NO PAIN PRESENT: ICD-10-PCS | Mod: S$GLB,,, | Performed by: PODIATRIST

## 2021-07-06 PROCEDURE — 11721 PR DEBRIDEMENT OF NAILS, 6 OR MORE: ICD-10-PCS | Mod: Q9,S$GLB,, | Performed by: PODIATRIST

## 2021-07-06 PROCEDURE — 3288F PR FALLS RISK ASSESSMENT DOCUMENTED: ICD-10-PCS | Mod: CPTII,S$GLB,, | Performed by: DERMATOLOGY

## 2021-07-06 PROCEDURE — 99999 PR PBB SHADOW E&M-EST. PATIENT-LVL III: ICD-10-PCS | Mod: PBBFAC,,, | Performed by: PODIATRIST

## 2021-07-06 PROCEDURE — 1101F PR PT FALLS ASSESS DOC 0-1 FALLS W/OUT INJ PAST YR: ICD-10-PCS | Mod: CPTII,S$GLB,, | Performed by: PODIATRIST

## 2021-07-06 PROCEDURE — 1100F PR PT FALLS ASSESS DOC 2+ FALLS/FALL W/INJURY/YR: ICD-10-PCS | Mod: CPTII,S$GLB,, | Performed by: DERMATOLOGY

## 2021-07-06 PROCEDURE — 1159F PR MEDICATION LIST DOCUMENTED IN MEDICAL RECORD: ICD-10-PCS | Mod: S$GLB,,, | Performed by: DERMATOLOGY

## 2021-07-09 ENCOUNTER — TELEPHONE (OUTPATIENT)
Dept: DERMATOLOGY | Facility: CLINIC | Age: 86
End: 2021-07-09

## 2021-09-11 ENCOUNTER — PATIENT MESSAGE (OUTPATIENT)
Dept: FAMILY MEDICINE | Facility: CLINIC | Age: 86
End: 2021-09-11

## 2021-09-16 ENCOUNTER — PATIENT MESSAGE (OUTPATIENT)
Dept: FAMILY MEDICINE | Facility: CLINIC | Age: 86
End: 2021-09-16

## 2021-10-11 ENCOUNTER — IMMUNIZATION (OUTPATIENT)
Dept: FAMILY MEDICINE | Facility: CLINIC | Age: 86
End: 2021-10-11
Payer: MEDICARE

## 2021-10-11 ENCOUNTER — OFFICE VISIT (OUTPATIENT)
Dept: PODIATRY | Facility: CLINIC | Age: 86
End: 2021-10-11
Payer: MEDICARE

## 2021-10-11 VITALS — WEIGHT: 138 LBS | BODY MASS INDEX: 23.56 KG/M2 | HEIGHT: 64 IN

## 2021-10-11 DIAGNOSIS — Z23 NEED FOR VACCINATION: Primary | ICD-10-CM

## 2021-10-11 DIAGNOSIS — M20.41 HAMMER TOES OF BOTH FEET: ICD-10-CM

## 2021-10-11 DIAGNOSIS — M20.42 HAMMER TOES OF BOTH FEET: ICD-10-CM

## 2021-10-11 DIAGNOSIS — I73.9 PERIPHERAL VASCULAR DISEASE: Primary | ICD-10-CM

## 2021-10-11 DIAGNOSIS — G60.9 IDIOPATHIC PERIPHERAL NEUROPATHY: ICD-10-CM

## 2021-10-11 DIAGNOSIS — B35.1 ONYCHOMYCOSIS DUE TO DERMATOPHYTE: ICD-10-CM

## 2021-10-11 PROCEDURE — 1126F PR PAIN SEVERITY QUANTIFIED, NO PAIN PRESENT: ICD-10-PCS | Mod: HCNC,CPTII,S$GLB, | Performed by: PODIATRIST

## 2021-10-11 PROCEDURE — 1126F AMNT PAIN NOTED NONE PRSNT: CPT | Mod: HCNC,CPTII,S$GLB, | Performed by: PODIATRIST

## 2021-10-11 PROCEDURE — 1159F PR MEDICATION LIST DOCUMENTED IN MEDICAL RECORD: ICD-10-PCS | Mod: HCNC,CPTII,S$GLB, | Performed by: PODIATRIST

## 2021-10-11 PROCEDURE — 1159F MED LIST DOCD IN RCRD: CPT | Mod: HCNC,CPTII,S$GLB, | Performed by: PODIATRIST

## 2021-10-11 PROCEDURE — 99499 UNLISTED E&M SERVICE: CPT | Mod: HCNC,S$GLB,, | Performed by: PODIATRIST

## 2021-10-11 PROCEDURE — 99499 RISK ADDL DX/OHS AUDIT: ICD-10-PCS | Mod: HCNC,S$GLB,, | Performed by: PODIATRIST

## 2021-10-11 PROCEDURE — 91300 COVID-19, MRNA, LNP-S, PF, 30 MCG/0.3 ML DOSE VACCINE: CPT | Mod: HCNC,PBBFAC | Performed by: RADIOLOGY

## 2021-10-11 PROCEDURE — 11721 DEBRIDE NAIL 6 OR MORE: CPT | Mod: Q9,HCNC,S$GLB, | Performed by: PODIATRIST

## 2021-10-11 PROCEDURE — 99999 PR PBB SHADOW E&M-EST. PATIENT-LVL II: CPT | Mod: PBBFAC,HCNC,, | Performed by: PODIATRIST

## 2021-10-11 PROCEDURE — 11721 PR DEBRIDEMENT OF NAILS, 6 OR MORE: ICD-10-PCS | Mod: Q9,HCNC,S$GLB, | Performed by: PODIATRIST

## 2021-10-11 PROCEDURE — 99999 PR PBB SHADOW E&M-EST. PATIENT-LVL II: ICD-10-PCS | Mod: PBBFAC,HCNC,, | Performed by: PODIATRIST

## 2021-11-02 ENCOUNTER — OFFICE VISIT (OUTPATIENT)
Dept: FAMILY MEDICINE | Facility: CLINIC | Age: 86
End: 2021-11-02
Payer: MEDICARE

## 2021-11-02 ENCOUNTER — LAB VISIT (OUTPATIENT)
Dept: LAB | Facility: HOSPITAL | Age: 86
End: 2021-11-02
Attending: INTERNAL MEDICINE
Payer: MEDICARE

## 2021-11-02 VITALS
SYSTOLIC BLOOD PRESSURE: 130 MMHG | HEART RATE: 60 BPM | DIASTOLIC BLOOD PRESSURE: 80 MMHG | OXYGEN SATURATION: 96 % | HEIGHT: 64 IN | BODY MASS INDEX: 25.25 KG/M2 | WEIGHT: 147.94 LBS

## 2021-11-02 DIAGNOSIS — H61.22 HEARING LOSS DUE TO CERUMEN IMPACTION, LEFT: ICD-10-CM

## 2021-11-02 DIAGNOSIS — N18.31 STAGE 3A CHRONIC KIDNEY DISEASE: ICD-10-CM

## 2021-11-02 DIAGNOSIS — E03.9 HYPOTHYROIDISM, UNSPECIFIED TYPE: ICD-10-CM

## 2021-11-02 DIAGNOSIS — I73.9 PERIPHERAL VASCULAR DISEASE: ICD-10-CM

## 2021-11-02 DIAGNOSIS — I70.0 AORTIC ATHEROSCLEROSIS: ICD-10-CM

## 2021-11-02 DIAGNOSIS — I10 ESSENTIAL HYPERTENSION: ICD-10-CM

## 2021-11-02 DIAGNOSIS — Z00.00 ANNUAL PHYSICAL EXAM: Primary | ICD-10-CM

## 2021-11-02 LAB
ALBUMIN SERPL BCP-MCNC: 3.4 G/DL (ref 3.5–5.2)
ALBUMIN SERPL BCP-MCNC: 3.4 G/DL (ref 3.5–5.2)
ALP SERPL-CCNC: 74 U/L (ref 55–135)
ALP SERPL-CCNC: 74 U/L (ref 55–135)
ALT SERPL W/O P-5'-P-CCNC: 19 U/L (ref 10–44)
ALT SERPL W/O P-5'-P-CCNC: 19 U/L (ref 10–44)
ANION GAP SERPL CALC-SCNC: 12 MMOL/L (ref 8–16)
ANION GAP SERPL CALC-SCNC: 12 MMOL/L (ref 8–16)
AST SERPL-CCNC: 18 U/L (ref 10–40)
AST SERPL-CCNC: 18 U/L (ref 10–40)
BILIRUB SERPL-MCNC: 1.1 MG/DL (ref 0.1–1)
BILIRUB SERPL-MCNC: 1.1 MG/DL (ref 0.1–1)
BUN SERPL-MCNC: 17 MG/DL (ref 10–30)
BUN SERPL-MCNC: 17 MG/DL (ref 10–30)
CALCIUM SERPL-MCNC: 9.3 MG/DL (ref 8.7–10.5)
CALCIUM SERPL-MCNC: 9.3 MG/DL (ref 8.7–10.5)
CHLORIDE SERPL-SCNC: 104 MMOL/L (ref 95–110)
CHLORIDE SERPL-SCNC: 104 MMOL/L (ref 95–110)
CHOLEST SERPL-MCNC: 219 MG/DL (ref 120–199)
CHOLEST/HDLC SERPL: 3.8 {RATIO} (ref 2–5)
CO2 SERPL-SCNC: 23 MMOL/L (ref 23–29)
CO2 SERPL-SCNC: 23 MMOL/L (ref 23–29)
CREAT SERPL-MCNC: 0.9 MG/DL (ref 0.5–1.4)
CREAT SERPL-MCNC: 0.9 MG/DL (ref 0.5–1.4)
EST. GFR  (AFRICAN AMERICAN): >60 ML/MIN/1.73 M^2
EST. GFR  (AFRICAN AMERICAN): >60 ML/MIN/1.73 M^2
EST. GFR  (NON AFRICAN AMERICAN): 55.3 ML/MIN/1.73 M^2
EST. GFR  (NON AFRICAN AMERICAN): 55.3 ML/MIN/1.73 M^2
GLUCOSE SERPL-MCNC: 85 MG/DL (ref 70–110)
GLUCOSE SERPL-MCNC: 85 MG/DL (ref 70–110)
HDLC SERPL-MCNC: 58 MG/DL (ref 40–75)
HDLC SERPL: 26.5 % (ref 20–50)
LDLC SERPL CALC-MCNC: 137.8 MG/DL (ref 63–159)
NONHDLC SERPL-MCNC: 161 MG/DL
POTASSIUM SERPL-SCNC: 4.3 MMOL/L (ref 3.5–5.1)
POTASSIUM SERPL-SCNC: 4.3 MMOL/L (ref 3.5–5.1)
PROT SERPL-MCNC: 6.5 G/DL (ref 6–8.4)
PROT SERPL-MCNC: 6.5 G/DL (ref 6–8.4)
SODIUM SERPL-SCNC: 139 MMOL/L (ref 136–145)
SODIUM SERPL-SCNC: 139 MMOL/L (ref 136–145)
TRIGL SERPL-MCNC: 116 MG/DL (ref 30–150)
TSH SERPL DL<=0.005 MIU/L-ACNC: 0.71 UIU/ML (ref 0.4–4)

## 2021-11-02 PROCEDURE — 1101F PR PT FALLS ASSESS DOC 0-1 FALLS W/OUT INJ PAST YR: ICD-10-PCS | Mod: HCNC,CPTII,S$GLB, | Performed by: INTERNAL MEDICINE

## 2021-11-02 PROCEDURE — 3288F PR FALLS RISK ASSESSMENT DOCUMENTED: ICD-10-PCS | Mod: HCNC,CPTII,S$GLB, | Performed by: INTERNAL MEDICINE

## 2021-11-02 PROCEDURE — 80061 LIPID PANEL: CPT | Mod: HCNC | Performed by: INTERNAL MEDICINE

## 2021-11-02 PROCEDURE — 1160F PR REVIEW ALL MEDS BY PRESCRIBER/CLIN PHARMACIST DOCUMENTED: ICD-10-PCS | Mod: HCNC,CPTII,S$GLB, | Performed by: INTERNAL MEDICINE

## 2021-11-02 PROCEDURE — 1126F AMNT PAIN NOTED NONE PRSNT: CPT | Mod: HCNC,CPTII,S$GLB, | Performed by: INTERNAL MEDICINE

## 2021-11-02 PROCEDURE — 99397 PR PREVENTIVE VISIT,EST,65 & OVER: ICD-10-PCS | Mod: HCNC,S$GLB,, | Performed by: INTERNAL MEDICINE

## 2021-11-02 PROCEDURE — 99999 PR PBB SHADOW E&M-EST. PATIENT-LVL IV: ICD-10-PCS | Mod: PBBFAC,HCNC,, | Performed by: INTERNAL MEDICINE

## 2021-11-02 PROCEDURE — 3288F FALL RISK ASSESSMENT DOCD: CPT | Mod: HCNC,CPTII,S$GLB, | Performed by: INTERNAL MEDICINE

## 2021-11-02 PROCEDURE — 1159F PR MEDICATION LIST DOCUMENTED IN MEDICAL RECORD: ICD-10-PCS | Mod: HCNC,CPTII,S$GLB, | Performed by: INTERNAL MEDICINE

## 2021-11-02 PROCEDURE — 1101F PT FALLS ASSESS-DOCD LE1/YR: CPT | Mod: HCNC,CPTII,S$GLB, | Performed by: INTERNAL MEDICINE

## 2021-11-02 PROCEDURE — 1159F MED LIST DOCD IN RCRD: CPT | Mod: HCNC,CPTII,S$GLB, | Performed by: INTERNAL MEDICINE

## 2021-11-02 PROCEDURE — 1160F RVW MEDS BY RX/DR IN RCRD: CPT | Mod: HCNC,CPTII,S$GLB, | Performed by: INTERNAL MEDICINE

## 2021-11-02 PROCEDURE — 84443 ASSAY THYROID STIM HORMONE: CPT | Mod: HCNC | Performed by: INTERNAL MEDICINE

## 2021-11-02 PROCEDURE — 80053 COMPREHEN METABOLIC PANEL: CPT | Mod: HCNC | Performed by: INTERNAL MEDICINE

## 2021-11-02 PROCEDURE — 1126F PR PAIN SEVERITY QUANTIFIED, NO PAIN PRESENT: ICD-10-PCS | Mod: HCNC,CPTII,S$GLB, | Performed by: INTERNAL MEDICINE

## 2021-11-02 PROCEDURE — 85025 COMPLETE CBC W/AUTO DIFF WBC: CPT | Mod: HCNC | Performed by: INTERNAL MEDICINE

## 2021-11-02 PROCEDURE — 99999 PR PBB SHADOW E&M-EST. PATIENT-LVL IV: CPT | Mod: PBBFAC,HCNC,, | Performed by: INTERNAL MEDICINE

## 2021-11-02 PROCEDURE — 36415 COLL VENOUS BLD VENIPUNCTURE: CPT | Mod: HCNC,PO | Performed by: INTERNAL MEDICINE

## 2021-11-02 PROCEDURE — 99397 PER PM REEVAL EST PAT 65+ YR: CPT | Mod: HCNC,S$GLB,, | Performed by: INTERNAL MEDICINE

## 2021-11-03 LAB
BASOPHILS # BLD AUTO: 0.06 K/UL (ref 0–0.2)
BASOPHILS NFR BLD: 0.9 % (ref 0–1.9)
DIFFERENTIAL METHOD: ABNORMAL
EOSINOPHIL # BLD AUTO: 0.4 K/UL (ref 0–0.5)
EOSINOPHIL NFR BLD: 6.9 % (ref 0–8)
ERYTHROCYTE [DISTWIDTH] IN BLOOD BY AUTOMATED COUNT: 13.6 % (ref 11.5–14.5)
HCT VFR BLD AUTO: 37.2 % (ref 37–48.5)
HGB BLD-MCNC: 11.8 G/DL (ref 12–16)
IMM GRANULOCYTES # BLD AUTO: 0.02 K/UL (ref 0–0.04)
IMM GRANULOCYTES NFR BLD AUTO: 0.3 % (ref 0–0.5)
LYMPHOCYTES # BLD AUTO: 1.6 K/UL (ref 1–4.8)
LYMPHOCYTES NFR BLD: 25.2 % (ref 18–48)
MCH RBC QN AUTO: 29.4 PG (ref 27–31)
MCHC RBC AUTO-ENTMCNC: 31.7 G/DL (ref 32–36)
MCV RBC AUTO: 93 FL (ref 82–98)
MONOCYTES # BLD AUTO: 0.8 K/UL (ref 0.3–1)
MONOCYTES NFR BLD: 13.1 % (ref 4–15)
NEUTROPHILS # BLD AUTO: 3.4 K/UL (ref 1.8–7.7)
NEUTROPHILS NFR BLD: 53.6 % (ref 38–73)
NRBC BLD-RTO: 0 /100 WBC
PLATELET # BLD AUTO: 278 K/UL (ref 150–450)
PMV BLD AUTO: 10.9 FL (ref 9.2–12.9)
RBC # BLD AUTO: 4.01 M/UL (ref 4–5.4)
WBC # BLD AUTO: 6.36 K/UL (ref 3.9–12.7)

## 2021-12-08 ENCOUNTER — OFFICE VISIT (OUTPATIENT)
Dept: OTOLARYNGOLOGY | Facility: CLINIC | Age: 86
End: 2021-12-08
Payer: MEDICARE

## 2021-12-08 ENCOUNTER — HOSPITAL ENCOUNTER (OUTPATIENT)
Dept: RADIOLOGY | Facility: HOSPITAL | Age: 86
Discharge: HOME | End: 2021-12-08
Attending: NURSE PRACTITIONER
Payer: MEDICARE

## 2021-12-08 VITALS — BODY MASS INDEX: 25.18 KG/M2 | WEIGHT: 147.5 LBS | HEIGHT: 64 IN

## 2021-12-08 DIAGNOSIS — Z12.31 VISIT FOR SCREENING MAMMOGRAM: ICD-10-CM

## 2021-12-08 DIAGNOSIS — H61.23 BILATERAL IMPACTED CERUMEN: Primary | ICD-10-CM

## 2021-12-08 DIAGNOSIS — H61.23 HEARING LOSS DUE TO CERUMEN IMPACTION, BILATERAL: ICD-10-CM

## 2021-12-08 PROCEDURE — 77067 MAMMO DIGITAL SCREENING LEFT WITH TOMO: ICD-10-PCS | Mod: 26,HCNC,, | Performed by: RADIOLOGY

## 2021-12-08 PROCEDURE — 99999 PR PBB SHADOW E&M-EST. PATIENT-LVL III: ICD-10-PCS | Mod: PBBFAC,HCNC,, | Performed by: NURSE PRACTITIONER

## 2021-12-08 PROCEDURE — 99499 NO LOS: ICD-10-PCS | Mod: HCNC,S$GLB,, | Performed by: NURSE PRACTITIONER

## 2021-12-08 PROCEDURE — 77063 BREAST TOMOSYNTHESIS BI: CPT | Mod: 26,HCNC,, | Performed by: RADIOLOGY

## 2021-12-08 PROCEDURE — 99999 PR PBB SHADOW E&M-EST. PATIENT-LVL III: CPT | Mod: PBBFAC,HCNC,, | Performed by: NURSE PRACTITIONER

## 2021-12-08 PROCEDURE — 99499 UNLISTED E&M SERVICE: CPT | Mod: HCNC,S$GLB,, | Performed by: NURSE PRACTITIONER

## 2021-12-08 PROCEDURE — 77067 SCR MAMMO BI INCL CAD: CPT | Mod: 26,HCNC,, | Performed by: RADIOLOGY

## 2021-12-08 PROCEDURE — 77063 MAMMO DIGITAL SCREENING LEFT WITH TOMO: ICD-10-PCS | Mod: 26,HCNC,, | Performed by: RADIOLOGY

## 2021-12-08 PROCEDURE — 69210 REMOVE IMPACTED EAR WAX UNI: CPT | Mod: HCNC,S$GLB,, | Performed by: NURSE PRACTITIONER

## 2021-12-08 PROCEDURE — 69210 PR REMOVAL IMPACTED CERUMEN REQUIRING INSTRUMENTATION, UNILATERAL: ICD-10-PCS | Mod: HCNC,S$GLB,, | Performed by: NURSE PRACTITIONER

## 2021-12-08 PROCEDURE — 77067 SCR MAMMO BI INCL CAD: CPT | Mod: TC,HCNC,PO

## 2022-01-25 ENCOUNTER — OFFICE VISIT (OUTPATIENT)
Dept: PODIATRY | Facility: CLINIC | Age: 87
End: 2022-01-25
Payer: MEDICARE

## 2022-01-25 VITALS — BODY MASS INDEX: 25.18 KG/M2 | WEIGHT: 147.5 LBS | HEIGHT: 64 IN

## 2022-01-25 DIAGNOSIS — G60.9 IDIOPATHIC PERIPHERAL NEUROPATHY: ICD-10-CM

## 2022-01-25 DIAGNOSIS — B35.1 ONYCHOMYCOSIS DUE TO DERMATOPHYTE: ICD-10-CM

## 2022-01-25 DIAGNOSIS — M20.41 HAMMER TOES OF BOTH FEET: ICD-10-CM

## 2022-01-25 DIAGNOSIS — I73.9 PERIPHERAL VASCULAR DISEASE: Primary | ICD-10-CM

## 2022-01-25 DIAGNOSIS — M20.42 HAMMER TOES OF BOTH FEET: ICD-10-CM

## 2022-01-25 PROCEDURE — 99499 UNLISTED E&M SERVICE: CPT | Mod: S$GLB,,, | Performed by: PODIATRIST

## 2022-01-25 PROCEDURE — 11721 DEBRIDE NAIL 6 OR MORE: CPT | Mod: Q9,HCNC,S$GLB, | Performed by: PODIATRIST

## 2022-01-25 PROCEDURE — 1101F PR PT FALLS ASSESS DOC 0-1 FALLS W/OUT INJ PAST YR: ICD-10-PCS | Mod: HCNC,CPTII,S$GLB, | Performed by: PODIATRIST

## 2022-01-25 PROCEDURE — 99999 PR PBB SHADOW E&M-EST. PATIENT-LVL II: ICD-10-PCS | Mod: PBBFAC,HCNC,, | Performed by: PODIATRIST

## 2022-01-25 PROCEDURE — 1126F AMNT PAIN NOTED NONE PRSNT: CPT | Mod: HCNC,CPTII,S$GLB, | Performed by: PODIATRIST

## 2022-01-25 PROCEDURE — 3288F FALL RISK ASSESSMENT DOCD: CPT | Mod: HCNC,CPTII,S$GLB, | Performed by: PODIATRIST

## 2022-01-25 PROCEDURE — 11721 PR DEBRIDEMENT OF NAILS, 6 OR MORE: ICD-10-PCS | Mod: Q9,HCNC,S$GLB, | Performed by: PODIATRIST

## 2022-01-25 PROCEDURE — 1126F PR PAIN SEVERITY QUANTIFIED, NO PAIN PRESENT: ICD-10-PCS | Mod: HCNC,CPTII,S$GLB, | Performed by: PODIATRIST

## 2022-01-25 PROCEDURE — 99213 PR OFFICE/OUTPT VISIT, EST, LEVL III, 20-29 MIN: ICD-10-PCS | Mod: 25,HCNC,S$GLB, | Performed by: PODIATRIST

## 2022-01-25 PROCEDURE — 99499 RISK ADDL DX/OHS AUDIT: ICD-10-PCS | Mod: S$GLB,,, | Performed by: PODIATRIST

## 2022-01-25 PROCEDURE — 99213 OFFICE O/P EST LOW 20 MIN: CPT | Mod: 25,HCNC,S$GLB, | Performed by: PODIATRIST

## 2022-01-25 PROCEDURE — 3288F PR FALLS RISK ASSESSMENT DOCUMENTED: ICD-10-PCS | Mod: HCNC,CPTII,S$GLB, | Performed by: PODIATRIST

## 2022-01-25 PROCEDURE — 1101F PT FALLS ASSESS-DOCD LE1/YR: CPT | Mod: HCNC,CPTII,S$GLB, | Performed by: PODIATRIST

## 2022-01-25 PROCEDURE — 99999 PR PBB SHADOW E&M-EST. PATIENT-LVL II: CPT | Mod: PBBFAC,HCNC,, | Performed by: PODIATRIST

## 2022-01-25 NOTE — PROGRESS NOTES
Subjective:      Patient ID: Silke Dodd is a 89 y.o. female.    Chief Complaint: No chief complaint on file.    Silke is a 89 y.o. female who presents to the clinic for evaluation and treatment of high risk feet. 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 The patient's chief complaint consists of toenails in need of trimming.  Denies being painful with wearing shoe gear.  Has not attempted to self treat.  Notes attempting to moisturizer her skin, as it easily dries out.  Reports no symptoms associated with neuropathy.  Denies symptoms of claudication.  Denies any additional pedal complaints.       PCP: Anibal Fisher DO    Date Last Seen by PCP: 11/21    No results found for: HGBA1C      Past Medical History:   Diagnosis Date    Acute angle-closure glaucoma of right eye     Breast cancer 2015    Cancer     BREAST, UTURINE    Corns and callosities     Depression     Hypertension     Hypothyroid     Osteoporosis, post-menopausal 4/23/2013    Ovarian cancer     Panic disorder        Past Surgical History:   Procedure Laterality Date    CATARACT EXTRACTION Bilateral     corrected for monovision    HYSTERECTOMY  1975    Cancer cells    MASTECTOMY Right 2015    TOTAL ABDOMINAL HYSTERECTOMY W/ BILATERAL SALPINGOOPHORECTOMY  1975       Family History   Problem Relation Age of Onset    Cancer Mother     Breast cancer Mother     Diabetes Father     Stroke Father     Coronary artery disease Father        Social History     Socioeconomic History    Marital status:     Number of children: 3   Tobacco Use    Smoking status: Never Smoker    Smokeless tobacco: Never Used   Substance and Sexual Activity    Alcohol use: Yes     Alcohol/week: 5.0 standard drinks     Types: 5 Glasses of wine per week     Drug use: No    Sexual activity: Never     Birth control/protection: Surgical     Social Determinants of Health     Financial Resource Strain: Medium Risk    Difficulty of Paying Living Expenses: Somewhat hard   Food Insecurity: No Food Insecurity    Worried About Running Out of Food in the Last Year: Never true    Ran Out of Food in the Last Year: Never true   Transportation Needs: No Transportation Needs    Lack of Transportation (Medical): No    Lack of Transportation (Non-Medical): No   Physical Activity: Sufficiently Active    Days of Exercise per Week: 5 days    Minutes of Exercise per Session: 60 min   Social Connections: Moderately Integrated    Frequency of Communication with Friends and Family: More than three times a week    Frequency of Social Gatherings with Friends and Family: Once a week    Attends Scientology Services: More than 4 times per year    Active Member of Clubs or Organizations: Yes    Attends Club or Organization Meetings: More than 4 times per year    Marital Status:        Current Outpatient Medications   Medication Sig Dispense Refill    acetaminophen (TYLENOL) 325 MG tablet Take 325 mg by mouth every 6 (six) hours as needed for Pain.      aspirin (ECOTRIN) 81 MG EC tablet Take 81 mg by mouth Daily.      cholecalciferol, vitamin D3, 125 mcg (5,000 unit) Tab Take 5,000 Units by mouth once daily.      diclofenac sodium (VOLTAREN) 1 % Gel Apply 2 g topically once daily. 20 g 1    EScitalopram oxalate (LEXAPRO) 20 MG tablet TAKE 1 TABLET BY MOUTH EVERY DAY 90 tablet 0    guaiFENesin (MUCINEX) 600 mg 12 hr tablet Take 1,200 mg by mouth 2 (two) times daily.      levothyroxine (SYNTHROID) 75 MCG tablet TAKE 1 TABLET BY MOUTH EVERY DAY 90 tablet 0    losartan (COZAAR) 100 MG tablet Take 1 tablet (100 mg total) by mouth once daily. 90 tablet 1    metoprolol succinate 25 mg CSpX Pt stated Take 1 tab in the am, 2 tabs in the evening 270 capsule 1     No current  facility-administered medications for this visit.       Review of patient's allergies indicates:  No Known Allergies      Review of Systems   Constitutional: Negative for chills.   Cardiovascular: Negative for claudication and leg swelling.   Skin: Positive for color change and nail changes.   Musculoskeletal: Negative for joint pain and joint swelling.   Gastrointestinal: Negative for nausea and vomiting.   Neurological: Positive for numbness. Negative for paresthesias.   Psychiatric/Behavioral: Negative for altered mental status.           Objective:      Physical Exam  Constitutional:       General: She is not in acute distress.     Appearance: She is well-developed. She is not diaphoretic.   Cardiovascular:      Pulses:           Dorsalis pedis pulses are 1+ on the right side and 1+ on the left side.        Posterior tibial pulses are 1+ on the right side and 1+ on the left side.      Comments: CFT 3 seconds bilateral.  Pedal hair growth decreased bilateral.   Mild varicosities noted bilateral.  Mild nonpitting edema noted to bilateral lower extremity.  Toes are cool to touch bilateral.    Musculoskeletal:         General: No tenderness.      Comments: Muscle strength 5/5 in all muscle groups bilateral.  No tenderness nor crepitation with ROM of foot/ankle joints bilateral.  No pain with palpation to bilateral foot and ankle.  Bilateral hallux abducto valgus.  Bilateral semi-rigid contracture of toes 2-5.       Skin:     General: Skin is warm and dry.      Capillary Refill: Capillary refill takes less than 2 seconds.      Coloration: Skin is not pale.      Findings: No abrasion, bruising, burn, ecchymosis, erythema, laceration, lesion, petechiae or rash.      Comments: Pedal skin appears thin bilateral.  Toenails x 10 appear thickened by 2 mm, elongated by 6 mm and discolored with subungual debris.  Examination of the skin reveals no evidence of significant maceration, rashes, open lesions, suspicious appearing  nevi or other concerning lesions.    Neurological:      Mental Status: She is alert and oriented to person, place, and time.      Sensory: Sensory deficit present.      Motor: No weakness or atrophy.      Comments: Protective sensation per Hubertus-Jose monofilament is absent bilateral.  Light touch is absent bilateral.               Assessment:       Encounter Diagnoses   Name Primary?    Peripheral vascular disease Yes    Idiopathic peripheral neuropathy     Hammer toes of both feet     Onychomycosis due to dermatophyte          Plan:       Diagnoses and all orders for this visit:    Peripheral vascular disease    Idiopathic peripheral neuropathy    Hammer toes of both feet    Onychomycosis due to dermatophyte      I counseled the patient on her conditions, their implications and medical management.    PVD remains stable with today's exam, will continue to monitor.    Patient advised to continue wearing shoe gear that accommodates for digital deformities.   Encouraged her to utilize shoes throughout the day, as barefoot walking puts her at risk of ulceration on account of the neuropathy.      With patient's permission, nails were aggressively reduced and debrided x 10 to their soft tissue attachment mechanically and with electric , removing all offending nail and debris. Patient relates relief following the procedure. She will continue to monitor the areas daily, inspect her feet, wear protective shoe gear when ambulatory, moisturizer to maintain skin integrity and follow in this office in approximately 3 months, sooner p.r.n.    Follow up in about 3 months (around 4/25/2022).    Narayan Jonas DPM

## 2022-04-10 ENCOUNTER — PATIENT MESSAGE (OUTPATIENT)
Dept: FAMILY MEDICINE | Facility: CLINIC | Age: 87
End: 2022-04-10
Payer: MEDICARE

## 2022-04-18 ENCOUNTER — OFFICE VISIT (OUTPATIENT)
Dept: FAMILY MEDICINE | Facility: CLINIC | Age: 87
End: 2022-04-18
Payer: MEDICARE

## 2022-04-18 ENCOUNTER — HOSPITAL ENCOUNTER (OUTPATIENT)
Dept: RADIOLOGY | Facility: HOSPITAL | Age: 87
Discharge: HOME | End: 2022-04-18
Attending: PHYSICIAN ASSISTANT
Payer: MEDICARE

## 2022-04-18 VITALS
DIASTOLIC BLOOD PRESSURE: 64 MMHG | BODY MASS INDEX: 25.67 KG/M2 | OXYGEN SATURATION: 98 % | SYSTOLIC BLOOD PRESSURE: 134 MMHG | HEIGHT: 64 IN | WEIGHT: 150.38 LBS | HEART RATE: 63 BPM

## 2022-04-18 DIAGNOSIS — N18.31 STAGE 3A CHRONIC KIDNEY DISEASE: ICD-10-CM

## 2022-04-18 DIAGNOSIS — Z48.02 ENCOUNTER FOR STAPLE REMOVAL: Primary | ICD-10-CM

## 2022-04-18 DIAGNOSIS — M54.50 ACUTE MIDLINE LOW BACK PAIN WITHOUT SCIATICA: ICD-10-CM

## 2022-04-18 PROCEDURE — 99999 PR PBB SHADOW E&M-EST. PATIENT-LVL III: CPT | Mod: PBBFAC,,, | Performed by: PHYSICIAN ASSISTANT

## 2022-04-18 PROCEDURE — 99499 UNLISTED E&M SERVICE: CPT | Mod: HCNC,S$GLB,, | Performed by: PHYSICIAN ASSISTANT

## 2022-04-18 PROCEDURE — 1159F PR MEDICATION LIST DOCUMENTED IN MEDICAL RECORD: ICD-10-PCS | Mod: CPTII,S$GLB,, | Performed by: PHYSICIAN ASSISTANT

## 2022-04-18 PROCEDURE — 3288F PR FALLS RISK ASSESSMENT DOCUMENTED: ICD-10-PCS | Mod: CPTII,S$GLB,, | Performed by: PHYSICIAN ASSISTANT

## 2022-04-18 PROCEDURE — 1125F AMNT PAIN NOTED PAIN PRSNT: CPT | Mod: CPTII,S$GLB,, | Performed by: PHYSICIAN ASSISTANT

## 2022-04-18 PROCEDURE — 99499 RISK ADDL DX/OHS AUDIT: ICD-10-PCS | Mod: HCNC,S$GLB,, | Performed by: PHYSICIAN ASSISTANT

## 2022-04-18 PROCEDURE — 1125F PR PAIN SEVERITY QUANTIFIED, PAIN PRESENT: ICD-10-PCS | Mod: CPTII,S$GLB,, | Performed by: PHYSICIAN ASSISTANT

## 2022-04-18 PROCEDURE — 99214 PR OFFICE/OUTPT VISIT, EST, LEVL IV, 30-39 MIN: ICD-10-PCS | Mod: S$GLB,,, | Performed by: PHYSICIAN ASSISTANT

## 2022-04-18 PROCEDURE — 1159F MED LIST DOCD IN RCRD: CPT | Mod: CPTII,S$GLB,, | Performed by: PHYSICIAN ASSISTANT

## 2022-04-18 PROCEDURE — 72100 XR LUMBAR SPINE AP AND LATERAL: ICD-10-PCS | Mod: 26,,, | Performed by: RADIOLOGY

## 2022-04-18 PROCEDURE — 72100 X-RAY EXAM L-S SPINE 2/3 VWS: CPT | Mod: 26,,, | Performed by: RADIOLOGY

## 2022-04-18 PROCEDURE — 99214 OFFICE O/P EST MOD 30 MIN: CPT | Mod: S$GLB,,, | Performed by: PHYSICIAN ASSISTANT

## 2022-04-18 PROCEDURE — 1100F PTFALLS ASSESS-DOCD GE2>/YR: CPT | Mod: CPTII,S$GLB,, | Performed by: PHYSICIAN ASSISTANT

## 2022-04-18 PROCEDURE — 1100F PR PT FALLS ASSESS DOC 2+ FALLS/FALL W/INJURY/YR: ICD-10-PCS | Mod: CPTII,S$GLB,, | Performed by: PHYSICIAN ASSISTANT

## 2022-04-18 PROCEDURE — 99999 PR PBB SHADOW E&M-EST. PATIENT-LVL III: ICD-10-PCS | Mod: PBBFAC,,, | Performed by: PHYSICIAN ASSISTANT

## 2022-04-18 PROCEDURE — 72100 X-RAY EXAM L-S SPINE 2/3 VWS: CPT | Mod: TC,FY,PO

## 2022-04-18 PROCEDURE — 3288F FALL RISK ASSESSMENT DOCD: CPT | Mod: CPTII,S$GLB,, | Performed by: PHYSICIAN ASSISTANT

## 2022-04-18 RX ORDER — CHLORTHALIDONE 25 MG/1
25 TABLET ORAL
COMMUNITY
Start: 2022-03-01

## 2022-04-18 RX ORDER — CLONIDINE HYDROCHLORIDE 0.1 MG/1
TABLET ORAL
COMMUNITY
Start: 2022-03-03

## 2022-04-18 NOTE — PROGRESS NOTES
"Subjective:      Patient ID: Silke Dodd is a 89 y.o. female.    Chief Complaint: Suture / Staple Removal (Fell 11 days ago; 7 staples on the back of head)  Patient is new to me.    HPI   Past medical history of essential hypertension, peripheral vascular disease, neuropathy, history of breast and ovarian cancer, hypothyroidism, osteoporosis, mixed urge and stress incontinence, and anxiety.    Patient went to [facility] 4/7/2022 after a fall which resulted in a scalp laceration.  7 staples placed.  Denies fever, dizziness, headaches, light-headedness.    Patient reports intermittent tailbone pain since fall.  Was not imaged in the ER.  Used voltaren gel for this with some pain resolution.    L[facility] at [facility].    Review of Systems   Constitutional: Negative for appetite change, chills and fever.   Respiratory: Negative for shortness of breath.    Cardiovascular: Negative for chest pain.   Musculoskeletal: Positive for back pain (lumbar ecchymosis).   Skin: Positive for wound (scalp laceration with staples).   Neurological: Negative for dizziness, light-headedness and headaches.       Objective:   /64   Pulse 63   Ht 5' 4" (1.626 m)   Wt 68.2 kg (150 lb 5.7 oz)   SpO2 98%   BMI 25.81 kg/m²      Physical Exam  Vitals reviewed.   Constitutional:       General: She is not in acute distress.     Appearance: Normal appearance. She is well-developed and well-groomed.   HENT:      Head: Normocephalic. Laceration present.        Right Ear: Hearing and external ear normal.      Left Ear: Hearing and external ear normal.   Eyes:      General: Lids are normal.      Conjunctiva/sclera: Conjunctivae normal.   Neck:      Trachea: Phonation normal.   Cardiovascular:      Rate and Rhythm: Normal rate and regular rhythm.      Heart sounds: Normal heart sounds. No murmur heard.    No friction rub. No gallop.   Pulmonary:      Effort: Pulmonary effort is normal. No respiratory distress.      Breath sounds: Normal " breath sounds. No decreased breath sounds, wheezing, rhonchi or rales.   Musculoskeletal:      Cervical back: Normal range of motion. No bony tenderness.      Thoracic back: No bony tenderness.      Lumbar back: Bony tenderness (ecchymosis noted over spine) present. Negative right straight leg raise test and negative left straight leg raise test.      Comments: Patient uses walker to ambulate.   Skin:     General: Skin is warm and dry.      Findings: No rash.   Neurological:      General: No focal deficit present.      Mental Status: She is alert and oriented to person, place, and time.   Psychiatric:         Attention and Perception: Attention normal.         Mood and Affect: Mood normal.         Speech: Speech normal.         Behavior: Behavior normal. Behavior is cooperative.        Assessment:      1. Encounter for staple removal    2. Acute midline low back pain without sciatica    3. Stage 3a chronic kidney disease       Plan:   1. Encounter for staple removal  Patient tolerated removal well.    2. Acute midline low back pain without sciatica  Use Tylenol and heating pad for pain.  - X-Ray Lumbar Spine AP And Lateral; Future    3. Stage 3a chronic kidney disease  Continued.    Follow up in 6 months with Dr. Fisher.  Patient agreed with plan and expressed understanding.    Thank you for allowing me to serve you,

## 2022-04-23 ENCOUNTER — PATIENT OUTREACH (OUTPATIENT)
Dept: ADMINISTRATIVE | Facility: OTHER | Age: 87
End: 2022-04-23
Payer: MEDICARE

## 2022-04-24 NOTE — PROGRESS NOTES
Health Maintenance Due   Topic Date Due    Pneumococcal Vaccines (Age 65+) (3 - PPSV23 or PCV20) 12/13/2018     Updates were requested from care everywhere.  Chart was reviewed for overdue Proactive [facility] Encounters (BAL[facility]I) topics (CRS, Breast Cancer Screening, Eye exam)  Health Maintenance has been updated.  LINKS immunization registry triggered.  Immunizations were reconciled.

## 2022-05-03 ENCOUNTER — IMMUNIZATION (OUTPATIENT)
Dept: FAMILY MEDICINE | Facility: CLINIC | Age: 87
End: 2022-05-03
Payer: MEDICARE

## 2022-05-03 DIAGNOSIS — Z23 NEED FOR VACCINATION: Primary | ICD-10-CM

## 2022-05-03 PROCEDURE — 91305 COVID-19, MRNA, LNP-S, PF, 30 MCG/0.3 ML DOSE VACCINE (PFIZER): CPT | Mod: PBBFAC | Performed by: INTERNAL MEDICINE

## 2022-05-09 ENCOUNTER — PATIENT MESSAGE (OUTPATIENT)
Dept: SMOKING CESSATION | Facility: CLINIC | Age: 87
End: 2022-05-09
Payer: MEDICARE

## 2022-05-10 ENCOUNTER — OFFICE VISIT (OUTPATIENT)
Dept: PODIATRY | Facility: CLINIC | Age: 87
End: 2022-05-10
Payer: MEDICARE

## 2022-05-10 VITALS — WEIGHT: 150.38 LBS | BODY MASS INDEX: 25.67 KG/M2 | HEIGHT: 64 IN

## 2022-05-10 DIAGNOSIS — I73.9 PERIPHERAL VASCULAR DISEASE: Primary | ICD-10-CM

## 2022-05-10 DIAGNOSIS — G60.9 IDIOPATHIC PERIPHERAL NEUROPATHY: ICD-10-CM

## 2022-05-10 DIAGNOSIS — B35.1 ONYCHOMYCOSIS DUE TO DERMATOPHYTE: ICD-10-CM

## 2022-05-10 PROCEDURE — 1126F PR PAIN SEVERITY QUANTIFIED, NO PAIN PRESENT: ICD-10-PCS | Mod: CPTII,S$GLB,, | Performed by: PODIATRIST

## 2022-05-10 PROCEDURE — 1159F MED LIST DOCD IN RCRD: CPT | Mod: CPTII,S$GLB,, | Performed by: PODIATRIST

## 2022-05-10 PROCEDURE — 99999 PR PBB SHADOW E&M-EST. PATIENT-LVL III: ICD-10-PCS | Mod: PBBFAC,,, | Performed by: PODIATRIST

## 2022-05-10 PROCEDURE — 99499 NO LOS: ICD-10-PCS | Mod: S$GLB,,, | Performed by: PODIATRIST

## 2022-05-10 PROCEDURE — 1126F AMNT PAIN NOTED NONE PRSNT: CPT | Mod: CPTII,S$GLB,, | Performed by: PODIATRIST

## 2022-05-10 PROCEDURE — 99999 PR PBB SHADOW E&M-EST. PATIENT-LVL III: CPT | Mod: PBBFAC,,, | Performed by: PODIATRIST

## 2022-05-10 PROCEDURE — 99499 UNLISTED E&M SERVICE: CPT | Mod: S$GLB,,, | Performed by: PODIATRIST

## 2022-05-10 PROCEDURE — 11721 PR DEBRIDEMENT OF NAILS, 6 OR MORE: ICD-10-PCS | Mod: Q9,S$GLB,, | Performed by: PODIATRIST

## 2022-05-10 PROCEDURE — 11721 DEBRIDE NAIL 6 OR MORE: CPT | Mod: Q9,S$GLB,, | Performed by: PODIATRIST

## 2022-05-10 PROCEDURE — 1159F PR MEDICATION LIST DOCUMENTED IN MEDICAL RECORD: ICD-10-PCS | Mod: CPTII,S$GLB,, | Performed by: PODIATRIST

## 2022-05-10 NOTE — PROGRESS NOTES
"Subjective:      Patient ID: Silke Dodd is a 89 y.o. female.    Chief Complaint: Venous Insufficiency and Routine Foot Care ([facility] 3'3'22)    Silke is a 89 y.o. female who presents to the clinic for evaluation and treatment of high risk feet. 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 The patient's chief complaint consists of toenails in need of trimming.  Denies being painful with wearing shoe gear.  Has not attempted to self treat.  Relates having both sporadic burning pain in bilateral forefoot and the sensation of pronounced "coldness" in the feet.  States symptoms resolve fairly quickly.  Inquires as to whether these are symptoms of neuropathy.  Continues to deny symptoms of claudication.  Denies any additional pedal complaints.       PCP: Anibal Fisher, DO    Date Last Seen by PCP: 4/22    Past Medical History:   Diagnosis Date    Acute angle-closure glaucoma of right eye     Breast cancer 2015    Cancer     BREAST, UTURINE    Corns and callosities     Depression     Hypertension     Hypothyroid     Osteoporosis, post-menopausal 4/23/2013    Ovarian cancer     Panic disorder        Past Surgical History:   Procedure Laterality Date    CATARACT EXTRACTION Bilateral     corrected for monovision    HYSTERECTOMY  1975    Cancer cells    MASTECTOMY Right 2015    TOTAL ABDOMINAL HYSTERECTOMY W/ BILATERAL SALPINGOOPHORECTOMY  1975       Family History   Problem Relation Age of Onset    Cancer Mother     Breast cancer Mother     Diabetes Father     Stroke Father     Coronary artery disease Father        Social History     Socioeconomic History    Marital status:     Number of children: 3   Tobacco Use    Smoking status: Never Smoker    Smokeless tobacco: Never Used   Substance and " Sexual Activity    Alcohol use: Yes     Alcohol/week: 5.0 standard drinks     Types: 5 Glasses of wine per week    Drug use: No    Sexual activity: Never     Birth control/protection: Surgical       Current Outpatient Medications   Medication Sig Dispense Refill    acetaminophen (TYLENOL) 325 MG tablet Take 325 mg by mouth every 6 (six) hours as needed for Pain.      aspirin (ECOTRIN) 81 MG EC tablet Take 81 mg by mouth Daily.      chlorthalidone (HYGROTEN) 25 MG Tab       cholecalciferol, vitamin D3, 125 mcg (5,000 unit) Tab Take 5,000 Units by mouth once daily.      cloNIDine (CATAPRES) 0.1 MG tablet 1 tablet by mouth  as needed every 1 to 2 hours for SBP over 160, max 6 tabs/day      diclofenac sodium (VOLTAREN) 1 % Gel Apply 2 g topically once daily. 20 g 1    guaiFENesin (MUCINEX) 600 mg 12 hr tablet Take 1,200 mg by mouth 2 (two) times daily.      levothyroxine (SYNTHROID) 75 MCG tablet TAKE 1 TABLET BY MOUTH EVERY DAY 90 tablet 0    losartan (COZAAR) 100 MG tablet Take 1 tablet (100 mg total) by mouth once daily. 90 tablet 1    metoprolol succinate 25 mg CSpX Pt stated Take 1 tab in the am, 2 tabs in the evening 270 capsule 1    EScitalopram oxalate (LEXAPRO) 20 MG tablet TAKE 1 TABLET BY MOUTH EVERY DAY 90 tablet 0     No current facility-administered medications for this visit.       Review of patient's allergies indicates:  No Known Allergies      Review of Systems   Constitutional: Negative for chills.   Cardiovascular: Negative for claudication and leg swelling.   Skin: Positive for color change and nail changes.   Musculoskeletal: Negative for joint pain and joint swelling.   Gastrointestinal: Negative for nausea and vomiting.   Neurological: Positive for numbness. Negative for paresthesias.   Psychiatric/Behavioral: Negative for altered mental status.           Objective:      Physical Exam  Constitutional:       General: She is not in acute distress.     Appearance: She is  well-developed. She is not diaphoretic.   Cardiovascular:      Pulses:           Dorsalis pedis pulses are 1+ on the right side and 1+ on the left side.        Posterior tibial pulses are 1+ on the right side and 1+ on the left side.      Comments: CFT 3 seconds bilateral.  Pedal hair growth decreased bilateral.   Mild varicosities noted bilateral.  Mild nonpitting edema noted to bilateral lower extremity.  Toes are cool to touch bilateral.    Musculoskeletal:         General: No tenderness.      Comments: Muscle strength 5/5 in all muscle groups bilateral.  No tenderness nor crepitation with ROM of foot/ankle joints bilateral.  No pain with palpation to bilateral foot and ankle.  Bilateral hallux abducto valgus.  Bilateral semi-rigid contracture of toes 2-5.       Skin:     General: Skin is warm and dry.      Capillary Refill: Capillary refill takes less than 2 seconds.      Coloration: Skin is not pale.      Findings: No abrasion, bruising, burn, ecchymosis, erythema, laceration, lesion, petechiae or rash.      Comments: Pedal skin appears thin bilateral.  Toenails x 10 appear thickened by 2 mm, elongated by 5 mm and discolored with subungual debris.  Examination of the skin reveals no evidence of significant maceration, rashes, open lesions, suspicious appearing nevi or other concerning lesions.    Neurological:      Mental Status: She is alert and oriented to person, place, and time.      Sensory: Sensory deficit present.      Motor: No weakness or atrophy.      Comments: Protective sensation per [place] monofilament is absent bilateral.  Light touch is absent bilateral.               Assessment:       Encounter Diagnoses   Name Primary?    Peripheral vascular disease Yes    Idiopathic peripheral neuropathy     Onychomycosis due to dermatophyte          Plan:       Silke was seen today for venous insufficiency and routine foot care.    Diagnoses and all orders for this visit:    Peripheral vascular  disease    Idiopathic peripheral neuropathy    Onychomycosis due to dermatophyte      I counseled the patient on her conditions, their implications and medical management.    PVD remains stable with today's exam, will continue to monitor.    Being that neuropathy symptoms occur sporadically, no further intervention is warranted.    With patient's permission, nails were aggressively reduced and debrided x 10 to their soft tissue attachment mechanically and with electric , removing all offending nail and debris. Patient relates relief following the procedure. She will continue to monitor the areas daily, inspect her feet, wear protective shoe gear when ambulatory, moisturizer to maintain skin integrity and follow in this office in approximately 3 months, sooner p.r.n.    Narayan Jonas DPM

## 2022-06-12 ENCOUNTER — NURSE TRIAGE (OUTPATIENT)
Dept: ADMINISTRATIVE | Facility: CLINIC | Age: 87
End: 2022-06-12
Payer: MEDICARE

## 2022-06-12 NOTE — TELEPHONE ENCOUNTER
Pt was in the ED over the weekend and was told to follow up with her PCP this week, no appointments avail Monday or Tuesday, daughter requesting an appointment/call back for PCP office on Monday.    Reason for Disposition   Requesting regular office appointment    Protocols used: INFORMATION ONLY CALL - NO TRIAGE-A-AH

## 2022-06-14 ENCOUNTER — LAB VISIT (OUTPATIENT)
Dept: LAB | Facility: HOSPITAL | Age: 87
End: 2022-06-14
Attending: PHYSICIAN ASSISTANT
Payer: MEDICARE

## 2022-06-14 ENCOUNTER — OFFICE VISIT (OUTPATIENT)
Dept: FAMILY MEDICINE | Facility: CLINIC | Age: 87
End: 2022-06-14
Payer: MEDICARE

## 2022-06-14 ENCOUNTER — PATIENT MESSAGE (OUTPATIENT)
Dept: FAMILY MEDICINE | Facility: CLINIC | Age: 87
End: 2022-06-14

## 2022-06-14 VITALS
WEIGHT: 144.81 LBS | HEART RATE: 63 BPM | BODY MASS INDEX: 24.72 KG/M2 | DIASTOLIC BLOOD PRESSURE: 60 MMHG | SYSTOLIC BLOOD PRESSURE: 116 MMHG | OXYGEN SATURATION: 97 % | HEIGHT: 64 IN

## 2022-06-14 DIAGNOSIS — R19.7 WATERY DIARRHEA: Primary | ICD-10-CM

## 2022-06-14 DIAGNOSIS — R19.7 WATERY DIARRHEA: ICD-10-CM

## 2022-06-14 LAB
C DIFF GDH STL QL: NEGATIVE
C DIFF TOX A+B STL QL IA: NEGATIVE

## 2022-06-14 PROCEDURE — 3288F FALL RISK ASSESSMENT DOCD: CPT | Mod: CPTII,S$GLB,, | Performed by: PHYSICIAN ASSISTANT

## 2022-06-14 PROCEDURE — 99214 PR OFFICE/OUTPT VISIT, EST, LEVL IV, 30-39 MIN: ICD-10-PCS | Mod: S$GLB,,, | Performed by: PHYSICIAN ASSISTANT

## 2022-06-14 PROCEDURE — 1126F AMNT PAIN NOTED NONE PRSNT: CPT | Mod: CPTII,S$GLB,, | Performed by: PHYSICIAN ASSISTANT

## 2022-06-14 PROCEDURE — 1159F MED LIST DOCD IN RCRD: CPT | Mod: CPTII,S$GLB,, | Performed by: PHYSICIAN ASSISTANT

## 2022-06-14 PROCEDURE — 99214 OFFICE O/P EST MOD 30 MIN: CPT | Mod: S$GLB,,, | Performed by: PHYSICIAN ASSISTANT

## 2022-06-14 PROCEDURE — 1160F PR REVIEW ALL MEDS BY PRESCRIBER/CLIN PHARMACIST DOCUMENTED: ICD-10-PCS | Mod: CPTII,S$GLB,, | Performed by: PHYSICIAN ASSISTANT

## 2022-06-14 PROCEDURE — 1126F PR PAIN SEVERITY QUANTIFIED, NO PAIN PRESENT: ICD-10-PCS | Mod: CPTII,S$GLB,, | Performed by: PHYSICIAN ASSISTANT

## 2022-06-14 PROCEDURE — 1159F PR MEDICATION LIST DOCUMENTED IN MEDICAL RECORD: ICD-10-PCS | Mod: CPTII,S$GLB,, | Performed by: PHYSICIAN ASSISTANT

## 2022-06-14 PROCEDURE — 1100F PR PT FALLS ASSESS DOC 2+ FALLS/FALL W/INJURY/YR: ICD-10-PCS | Mod: CPTII,S$GLB,, | Performed by: PHYSICIAN ASSISTANT

## 2022-06-14 PROCEDURE — 3288F PR FALLS RISK ASSESSMENT DOCUMENTED: ICD-10-PCS | Mod: CPTII,S$GLB,, | Performed by: PHYSICIAN ASSISTANT

## 2022-06-14 PROCEDURE — 99999 PR PBB SHADOW E&M-EST. PATIENT-LVL IV: CPT | Mod: PBBFAC,,, | Performed by: PHYSICIAN ASSISTANT

## 2022-06-14 PROCEDURE — 1100F PTFALLS ASSESS-DOCD GE2>/YR: CPT | Mod: CPTII,S$GLB,, | Performed by: PHYSICIAN ASSISTANT

## 2022-06-14 PROCEDURE — 87449 NOS EACH ORGANISM AG IA: CPT | Performed by: PHYSICIAN ASSISTANT

## 2022-06-14 PROCEDURE — 99999 PR PBB SHADOW E&M-EST. PATIENT-LVL IV: ICD-10-PCS | Mod: PBBFAC,,, | Performed by: PHYSICIAN ASSISTANT

## 2022-06-14 PROCEDURE — 1160F RVW MEDS BY RX/DR IN RCRD: CPT | Mod: CPTII,S$GLB,, | Performed by: PHYSICIAN ASSISTANT

## 2022-06-14 NOTE — PROGRESS NOTES
"Subjective:      Patient ID: Silke Dodd is a 89 y.o. female.    Chief Complaint: ER follow up (Went to [facility] ER for vommiting and Diarrhea; still having diarrhea)  Patient in visit with daughter.    HPI   Past medical history of essential hypertension, peripheral vascular disease, neuropathy, history of breast and ovarian cancer, hypothyroidism, osteoporosis, mixed urge and stress incontinence, and anxiety.    Patient went to [facility] ED 6/11/2022 with viral gastroenteritis and treated with cipro and flagyl.      Her symptoms started 6/9/2022.  Taking cipro 250mg BID and flagyl 250mg BID for three days and instructed to increase to TID.  Having watery diarrhea 4 times daily.  Patient denies fever, abdominal pain, or current vomiting.    Review of Systems   Constitutional: Negative for fever.   Gastrointestinal: Positive for abdominal pain (resolved), diarrhea, nausea and vomiting (twice on Thursday). Negative for blood in stool and constipation.   Genitourinary: Negative for dysuria, frequency and hematuria.   Musculoskeletal: Negative for arthralgias and myalgias.   Neurological: Negative for headaches.       Objective:   /60   Pulse 63   Ht 5' 4" (1.626 m)   Wt 65.7 kg (144 lb 13.5 oz)   SpO2 97%   BMI 24.86 kg/m²      Physical Exam  Vitals reviewed.   Constitutional:       General: She is not in acute distress.     Appearance: Normal appearance. She is well-developed.   HENT:      Head: Normocephalic and atraumatic.      Right Ear: External ear normal.      Left Ear: External ear normal.   Eyes:      Conjunctiva/sclera: Conjunctivae normal.   Cardiovascular:      Rate and Rhythm: Normal rate and regular rhythm.      Heart sounds: Normal heart sounds. No murmur heard.    No friction rub. No gallop.   Pulmonary:      Effort: Pulmonary effort is normal. No respiratory distress.      Breath sounds: Normal breath sounds. No wheezing or rales.   Abdominal:      General: Bowel sounds are normal. "      Palpations: Abdomen is soft.      Tenderness: There is no abdominal tenderness.   Musculoskeletal:         General: Normal range of motion.      Cervical back: Normal range of motion.   Skin:     General: Skin is warm and dry.      Findings: No rash.   Neurological:      General: No focal deficit present.      Mental Status: She is alert and oriented to person, place, and time.   Psychiatric:         Mood and Affect: Mood normal.         Behavior: Behavior normal.         Judgment: Judgment normal.        Assessment:      1. Watery diarrhea       Plan:   1. Watery diarrhea  Start metamucil daily to stiffen stool.  Drink plenty of water daily.  - CLOSTRIDIUM DIFFICILE; Future    Follow up as needed.  Patient agreed with plan and expressed understanding.  I spent 30 minutes on this encounter, time includes face-to-face, chart review, documentation, test review and orders.   Thank you for allowing me to serve you,

## 2022-06-15 ENCOUNTER — PATIENT MESSAGE (OUTPATIENT)
Dept: FAMILY MEDICINE | Facility: CLINIC | Age: 87
End: 2022-06-15
Payer: MEDICARE

## 2022-06-16 ENCOUNTER — TELEPHONE (OUTPATIENT)
Dept: FAMILY MEDICINE | Facility: CLINIC | Age: 87
End: 2022-06-16
Payer: MEDICARE

## 2022-06-16 ENCOUNTER — PATIENT MESSAGE (OUTPATIENT)
Dept: FAMILY MEDICINE | Facility: CLINIC | Age: 87
End: 2022-06-16
Payer: MEDICARE

## 2022-06-16 NOTE — TELEPHONE ENCOUNTER
----- Message from Kenisha Whitfield sent at 6/16/2022  9:27 AM CDT -----  Regarding: Call Back  Who Called:  pt         What is the reason for the call: pt is calling in regards to issues she was discussing via [employer].. pt did not go into detail. Would like call from someone in office. Please contact          Can patient be contacted on [employer]: Yes         Call back number: [phone]

## 2022-08-16 ENCOUNTER — OFFICE VISIT (OUTPATIENT)
Dept: PODIATRY | Facility: CLINIC | Age: 87
End: 2022-08-16
Payer: MEDICARE

## 2022-08-16 VITALS — BODY MASS INDEX: 24.72 KG/M2 | HEIGHT: 64 IN | WEIGHT: 144.81 LBS

## 2022-08-16 DIAGNOSIS — G60.9 IDIOPATHIC PERIPHERAL NEUROPATHY: ICD-10-CM

## 2022-08-16 DIAGNOSIS — M20.42 HAMMER TOES OF BOTH FEET: ICD-10-CM

## 2022-08-16 DIAGNOSIS — M20.41 HAMMER TOES OF BOTH FEET: ICD-10-CM

## 2022-08-16 DIAGNOSIS — I73.9 PERIPHERAL VASCULAR DISEASE: Primary | ICD-10-CM

## 2022-08-16 DIAGNOSIS — B35.1 ONYCHOMYCOSIS DUE TO DERMATOPHYTE: ICD-10-CM

## 2022-08-16 PROCEDURE — 11721 PR DEBRIDEMENT OF NAILS, 6 OR MORE: ICD-10-PCS | Mod: Q9,S$GLB,, | Performed by: PODIATRIST

## 2022-08-16 PROCEDURE — 1159F PR MEDICATION LIST DOCUMENTED IN MEDICAL RECORD: ICD-10-PCS | Mod: CPTII,S$GLB,, | Performed by: PODIATRIST

## 2022-08-16 PROCEDURE — 11721 DEBRIDE NAIL 6 OR MORE: CPT | Mod: Q9,S$GLB,, | Performed by: PODIATRIST

## 2022-08-16 PROCEDURE — 3288F PR FALLS RISK ASSESSMENT DOCUMENTED: ICD-10-PCS | Mod: CPTII,S$GLB,, | Performed by: PODIATRIST

## 2022-08-16 PROCEDURE — 1101F PT FALLS ASSESS-DOCD LE1/YR: CPT | Mod: CPTII,S$GLB,, | Performed by: PODIATRIST

## 2022-08-16 PROCEDURE — 99499 NO LOS: ICD-10-PCS | Mod: S$GLB,,, | Performed by: PODIATRIST

## 2022-08-16 PROCEDURE — 99499 UNLISTED E&M SERVICE: CPT | Mod: S$GLB,,, | Performed by: PODIATRIST

## 2022-08-16 PROCEDURE — 1159F MED LIST DOCD IN RCRD: CPT | Mod: CPTII,S$GLB,, | Performed by: PODIATRIST

## 2022-08-16 PROCEDURE — 3288F FALL RISK ASSESSMENT DOCD: CPT | Mod: CPTII,S$GLB,, | Performed by: PODIATRIST

## 2022-08-16 PROCEDURE — 99999 PR PBB SHADOW E&M-EST. PATIENT-LVL II: CPT | Mod: PBBFAC,,, | Performed by: PODIATRIST

## 2022-08-16 PROCEDURE — 1126F PR PAIN SEVERITY QUANTIFIED, NO PAIN PRESENT: ICD-10-PCS | Mod: CPTII,S$GLB,, | Performed by: PODIATRIST

## 2022-08-16 PROCEDURE — 99999 PR PBB SHADOW E&M-EST. PATIENT-LVL II: ICD-10-PCS | Mod: PBBFAC,,, | Performed by: PODIATRIST

## 2022-08-16 PROCEDURE — 1101F PR PT FALLS ASSESS DOC 0-1 FALLS W/OUT INJ PAST YR: ICD-10-PCS | Mod: CPTII,S$GLB,, | Performed by: PODIATRIST

## 2022-08-16 PROCEDURE — 1126F AMNT PAIN NOTED NONE PRSNT: CPT | Mod: CPTII,S$GLB,, | Performed by: PODIATRIST

## 2022-08-16 RX ORDER — METOPROLOL SUCCINATE 50 MG/1
50 TABLET, EXTENDED RELEASE ORAL NIGHTLY
COMMUNITY
Start: 2022-05-30

## 2022-08-16 RX ORDER — FAMOTIDINE 20 MG/1
TABLET, FILM COATED ORAL
COMMUNITY
End: 2024-02-08

## 2022-08-16 RX ORDER — METOPROLOL SUCCINATE 25 MG/1
25 TABLET, EXTENDED RELEASE ORAL EVERY MORNING
COMMUNITY
Start: 2022-03-01

## 2022-08-16 RX ORDER — ESCITALOPRAM OXALATE 5 MG/1
5 TABLET ORAL DAILY
COMMUNITY
Start: 2022-06-02 | End: 2023-11-08

## 2022-08-16 RX ORDER — LOSARTAN POTASSIUM 50 MG/1
50 TABLET ORAL DAILY
COMMUNITY
Start: 2022-03-01

## 2022-08-16 NOTE — PROGRESS NOTES
Subjective:      Patient ID: Silke Dodd is a 89 y.o. female.    Chief Complaint: Peripheral Vascular Disease (6/14/22 Roberto)    Silke is a 89 y.o. female who presents to the clinic for evaluation and treatment of high risk feet. 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 The patient's chief complaint consists of toenails in need of trimming.  Denies being painful with wearing shoe gear.  Has not attempted to self treat.  Denies experiencing any recent neuropathy related pain. Continues to deny symptoms of claudication.  Denies any additional pedal complaints.       PCP: Anibal Fisher,     Date Last Seen by PCP: 6/22    Past Medical History:   Diagnosis Date    Acute angle-closure glaucoma of right eye     Breast cancer 2015    Cancer     BREAST, UTURINE    Corns and callosities     Depression     Hypertension     Hypothyroid     Osteoporosis, post-menopausal 4/23/2013    Ovarian cancer     Panic disorder        Past Surgical History:   Procedure Laterality Date    CATARACT EXTRACTION Bilateral     corrected for monovision    HYSTERECTOMY  1975    Cancer cells    MASTECTOMY Right 2015    TOTAL ABDOMINAL HYSTERECTOMY W/ BILATERAL SALPINGOOPHORECTOMY  1975       Family History   Problem Relation Age of Onset    Cancer Mother     Breast cancer Mother     Diabetes Father     Stroke Father     Coronary artery disease Father        Social History     Socioeconomic History    Marital status:     Number of children: 3   Tobacco Use    Smoking status: Never Smoker    Smokeless tobacco: Never Used   Substance and Sexual Activity    Alcohol use: Yes     Alcohol/week: 5.0 standard drinks     Types: 5 Glasses of wine per week    Drug use: No    Sexual activity: Never     Birth  control/protection: Surgical       Current Outpatient Medications   Medication Sig Dispense Refill    losartan (COZAAR) 50 MG tablet 50 mg.      acetaminophen (TYLENOL) 325 MG tablet Take 325 mg by mouth every 6 (six) hours as needed for Pain.      aspirin (ECOTRIN) 81 MG EC tablet Take 81 mg by mouth Daily.      chlorthalidone (HYGROTEN) 25 MG Tab       cholecalciferol, vitamin D3, 125 mcg (5,000 unit) Tab Take 5,000 Units by mouth once daily.      cloNIDine (CATAPRES) 0.1 MG tablet 1 tablet by mouth  as needed every 1 to 2 hours for SBP over 160, max 6 tabs/day      diclofenac sodium (VOLTAREN) 1 % Gel Apply 2 g topically once daily. 20 g 1    EScitalopram oxalate (LEXAPRO) 20 MG tablet TAKE 1 TABLET BY MOUTH EVERY DAY 90 tablet 0    EScitalopram oxalate (LEXAPRO) 5 MG Tab Take 5 mg by mouth once daily.      famotidine (PEPCID) 20 MG tablet Take 1 tablet (20 mg total) by mouth 2 (two) times daily. 20 tablet 0    famotidine (PEPCID) 20 MG tablet ONCE DAILY      guaiFENesin (MUCINEX) 600 mg 12 hr tablet Take 1,200 mg by mouth 2 (two) times daily.      levothyroxine (SYNTHROID) 75 MCG tablet TAKE 1 TABLET BY MOUTH EVERY DAY 90 tablet 0    losartan (COZAAR) 100 MG tablet Take 1 tablet (100 mg total) by mouth once daily. (Patient taking differently: Take 150 mg by mouth once daily.) 90 tablet 1    metoprolol succinate (TOPROL-XL) 25 MG 24 hr tablet       metoprolol succinate (TOPROL-XL) 50 MG 24 hr tablet Take by mouth.      metoprolol succinate 25 mg CSpX Pt stated Take 1 tab in the am, 2 tabs in the evening 270 capsule 1    ondansetron (ZOFRAN-ODT) 4 MG TbDL Take 1 tablet (4 mg total) by mouth every 6 (six) hours as needed. 12 tablet 0     No current facility-administered medications for this visit.       Review of patient's allergies indicates:  No Known Allergies      Review of Systems   Constitutional: Negative for chills.   Cardiovascular: Negative for claudication and leg swelling.   Skin:  Positive for color change and nail changes.   Musculoskeletal: Negative for joint pain and joint swelling.   Gastrointestinal: Negative for nausea and vomiting.   Neurological: Positive for numbness. Negative for paresthesias.   Psychiatric/Behavioral: Negative for altered mental status.           Objective:      Physical Exam  Constitutional:       General: She is not in acute distress.     Appearance: She is well-developed. She is not diaphoretic.   Cardiovascular:      Pulses:           Dorsalis pedis pulses are 1+ on the right side and 1+ on the left side.        Posterior tibial pulses are 1+ on the right side and 1+ on the left side.      Comments: CFT 3 seconds bilateral.  Pedal hair growth decreased bilateral.   Mild varicosities noted bilateral.  Mild nonpitting edema noted to bilateral lower extremity.  Toes are cool to touch bilateral.    Musculoskeletal:         General: No tenderness.      Comments: Muscle strength 5/5 in all muscle groups bilateral.  No tenderness nor crepitation with ROM of foot/ankle joints bilateral.  No pain with palpation to bilateral foot and ankle.  Bilateral hallux abducto valgus.  Bilateral semi-rigid contracture of toes 2-5.       Skin:     General: Skin is warm and dry.      Capillary Refill: Capillary refill takes less than 2 seconds.      Coloration: Skin is not pale.      Findings: No abrasion, bruising, burn, ecchymosis, erythema, laceration, lesion, petechiae or rash.      Comments: Pedal skin appears thin bilateral.  Toenails x 10 appear thickened by 2 mm, elongated by 4 mm and discolored with subungual debris.  Examination of the skin reveals no evidence of significant maceration, rashes, open lesions, suspicious appearing nevi or other concerning lesions.    Neurological:      Mental Status: She is alert and oriented to person, place, and time.      Sensory: Sensory deficit present.      Motor: No weakness or atrophy.      Comments: Protective sensation per  [place] monofilament is absent bilateral.  Light touch is absent bilateral.               Assessment:       Encounter Diagnoses   Name Primary?    Peripheral vascular disease Yes    Idiopathic peripheral neuropathy     Hammer toes of both feet     Onychomycosis due to dermatophyte          Plan:       Silke was seen today for peripheral vascular disease.    Diagnoses and all orders for this visit:    Peripheral vascular disease    Idiopathic peripheral neuropathy    Hammer toes of both feet    Onychomycosis due to dermatophyte      I counseled the patient on her conditions, their implications and medical management.    PVD and neuropathy remains stable with today's exam, will continue to monitor.    Advised to continue wearing shoe gear that accommodates for digital deformities.    With patient's permission, nails were aggressively reduced and debrided x 10 to their soft tissue attachment mechanically and with electric , removing all offending nail and debris. Patient relates relief following the procedure. She will continue to monitor the areas daily, inspect her feet, wear protective shoe gear when ambulatory, moisturizer to maintain skin integrity and follow in this office in approximately 3 months, sooner p.r.n.    Narayan Jonas DPM

## 2022-09-08 ENCOUNTER — PES CALL (OUTPATIENT)
Dept: ADMINISTRATIVE | Facility: CLINIC | Age: 87
End: 2022-09-08
Payer: MEDICARE

## 2022-11-08 ENCOUNTER — LAB VISIT (OUTPATIENT)
Dept: LAB | Facility: HOSPITAL | Age: 87
End: 2022-11-08
Payer: MEDICARE

## 2022-11-08 ENCOUNTER — OFFICE VISIT (OUTPATIENT)
Dept: FAMILY MEDICINE | Facility: CLINIC | Age: 87
End: 2022-11-08
Payer: MEDICARE

## 2022-11-08 VITALS
HEIGHT: 64 IN | BODY MASS INDEX: 26 KG/M2 | OXYGEN SATURATION: 98 % | DIASTOLIC BLOOD PRESSURE: 74 MMHG | HEART RATE: 61 BPM | SYSTOLIC BLOOD PRESSURE: 118 MMHG | WEIGHT: 152.31 LBS

## 2022-11-08 DIAGNOSIS — E03.8 OTHER SPECIFIED HYPOTHYROIDISM: ICD-10-CM

## 2022-11-08 DIAGNOSIS — Z85.43 HISTORY OF OVARIAN CANCER: ICD-10-CM

## 2022-11-08 DIAGNOSIS — Z00.00 PREVENTATIVE HEALTH CARE: ICD-10-CM

## 2022-11-08 DIAGNOSIS — I70.0 AORTIC ATHEROSCLEROSIS: ICD-10-CM

## 2022-11-08 DIAGNOSIS — Z85.3 HISTORY OF BREAST CANCER: ICD-10-CM

## 2022-11-08 DIAGNOSIS — N18.31 STAGE 3A CHRONIC KIDNEY DISEASE: ICD-10-CM

## 2022-11-08 DIAGNOSIS — Z00.00 PREVENTATIVE HEALTH CARE: Primary | ICD-10-CM

## 2022-11-08 PROCEDURE — 36415 COLL VENOUS BLD VENIPUNCTURE: CPT | Mod: PO | Performed by: PHYSICIAN ASSISTANT

## 2022-11-08 PROCEDURE — 99999 PR PBB SHADOW E&M-EST. PATIENT-LVL IV: CPT | Mod: PBBFAC,,, | Performed by: PHYSICIAN ASSISTANT

## 2022-11-08 PROCEDURE — 99214 OFFICE O/P EST MOD 30 MIN: CPT | Mod: S$GLB,,, | Performed by: PHYSICIAN ASSISTANT

## 2022-11-08 PROCEDURE — 1101F PR PT FALLS ASSESS DOC 0-1 FALLS W/OUT INJ PAST YR: ICD-10-PCS | Mod: CPTII,S$GLB,, | Performed by: PHYSICIAN ASSISTANT

## 2022-11-08 PROCEDURE — 1159F PR MEDICATION LIST DOCUMENTED IN MEDICAL RECORD: ICD-10-PCS | Mod: CPTII,S$GLB,, | Performed by: PHYSICIAN ASSISTANT

## 2022-11-08 PROCEDURE — 3288F PR FALLS RISK ASSESSMENT DOCUMENTED: ICD-10-PCS | Mod: CPTII,S$GLB,, | Performed by: PHYSICIAN ASSISTANT

## 2022-11-08 PROCEDURE — 99999 PR PBB SHADOW E&M-EST. PATIENT-LVL IV: ICD-10-PCS | Mod: PBBFAC,,, | Performed by: PHYSICIAN ASSISTANT

## 2022-11-08 PROCEDURE — 80061 LIPID PANEL: CPT | Performed by: PHYSICIAN ASSISTANT

## 2022-11-08 PROCEDURE — 3288F FALL RISK ASSESSMENT DOCD: CPT | Mod: CPTII,S$GLB,, | Performed by: PHYSICIAN ASSISTANT

## 2022-11-08 PROCEDURE — 99214 PR OFFICE/OUTPT VISIT, EST, LEVL IV, 30-39 MIN: ICD-10-PCS | Mod: S$GLB,,, | Performed by: PHYSICIAN ASSISTANT

## 2022-11-08 PROCEDURE — 1159F MED LIST DOCD IN RCRD: CPT | Mod: CPTII,S$GLB,, | Performed by: PHYSICIAN ASSISTANT

## 2022-11-08 PROCEDURE — 84443 ASSAY THYROID STIM HORMONE: CPT | Performed by: PHYSICIAN ASSISTANT

## 2022-11-08 PROCEDURE — 1126F PR PAIN SEVERITY QUANTIFIED, NO PAIN PRESENT: ICD-10-PCS | Mod: CPTII,S$GLB,, | Performed by: PHYSICIAN ASSISTANT

## 2022-11-08 PROCEDURE — 1101F PT FALLS ASSESS-DOCD LE1/YR: CPT | Mod: CPTII,S$GLB,, | Performed by: PHYSICIAN ASSISTANT

## 2022-11-08 PROCEDURE — 80053 COMPREHEN METABOLIC PANEL: CPT | Performed by: PHYSICIAN ASSISTANT

## 2022-11-08 PROCEDURE — 1126F AMNT PAIN NOTED NONE PRSNT: CPT | Mod: CPTII,S$GLB,, | Performed by: PHYSICIAN ASSISTANT

## 2022-11-08 NOTE — PROGRESS NOTES
"Subjective:      Patient ID: Silke Dodd is a 89 y.o. female.    Chief Complaint: Hypertension and Chronic Kidney Disease    Patient in visit with daughter.    HPI  Patient in visit to review chronic medical issues.    HTN-controlled taking chlorthalidone TID.  Aortic atherosclerosis-update lipid  [id number].  History of breast and ovarian cancer-Dr. Mathias manages this.  Hypothyroid-needs TSH update  GERD-no issues.  No recent falls.  Using walker.  Living at [facility] with independent living.    Review of Systems   Constitutional:  Negative for appetite change, chills, diaphoresis and fever.   Respiratory:  Negative for shortness of breath.    Cardiovascular:  Negative for chest pain.   Gastrointestinal:  Negative for abdominal pain, blood in stool, constipation, diarrhea, nausea and vomiting.   Genitourinary:  Negative for dysuria and hematuria.   Musculoskeletal:  Negative for back pain and myalgias.   Neurological:  Negative for dizziness, light-headedness and headaches.   Psychiatric/Behavioral:  Negative for sleep disturbance.        Objective:   /74   Pulse 61   Ht 5' 4" (1.626 m)   Wt 69.1 kg (152 lb 5.4 oz)   SpO2 98%   BMI 26.15 kg/m²     Physical Exam  Vitals reviewed.   Constitutional:       Appearance: Normal appearance. She is well-developed.   HENT:      Head: Normocephalic and atraumatic.      Right Ear: External ear normal.      Left Ear: External ear normal.   Eyes:      Conjunctiva/sclera: Conjunctivae normal.   Cardiovascular:      Rate and Rhythm: Normal rate and regular rhythm.      Heart sounds: Normal heart sounds. No murmur heard.    No friction rub. No gallop.   Pulmonary:      Effort: Pulmonary effort is normal. No respiratory distress.      Breath sounds: Normal breath sounds. No wheezing, rhonchi or rales.   Musculoskeletal:      Right lower leg: No edema.      Left lower leg: No edema.      Comments: Uses a walker to ambulate.   Skin:     General: Skin is warm and " dry.      Findings: No rash.   Neurological:      General: No focal deficit present.      Mental Status: She is alert and oriented to person, place, and time.   Psychiatric:         Mood and Affect: Mood normal.         Behavior: Behavior normal.         Judgment: Judgment normal.     Assessment:      1. Preventative health care    2. Other specified hypothyroidism    3. Aortic atherosclerosis    4. Stage 3a chronic kidney disease    5. History of breast cancer    6. History of ovarian cancer       Plan:   1. Preventative health care  Bloodwork today.  - Comprehensive Metabolic Panel; Future    2. Other specified hypothyroidism  - TSH; Future    3. Aortic atherosclerosis  - Lipid Panel; Future    4. Stage 3a chronic kidney disease  Continued.    5. History of breast cancer  Dr. Mathias manages this.    6. History of ovarian cancer  Dr. Mathias manages this.    Follow up in 6 months with Dr. Fisher.  Patient agreed with plan and expressed understanding.    Thank you for allowing me to serve you,

## 2022-11-09 LAB
ALBUMIN SERPL BCP-MCNC: 3.5 G/DL (ref 3.5–5.2)
ALP SERPL-CCNC: 70 U/L (ref 55–135)
ALT SERPL W/O P-5'-P-CCNC: 9 U/L (ref 10–44)
ANION GAP SERPL CALC-SCNC: 10 MMOL/L (ref 8–16)
AST SERPL-CCNC: 26 U/L (ref 10–40)
BILIRUB SERPL-MCNC: 0.7 MG/DL (ref 0.1–1)
BUN SERPL-MCNC: 17 MG/DL (ref 10–30)
CALCIUM SERPL-MCNC: 9.2 MG/DL (ref 8.7–10.5)
CHLORIDE SERPL-SCNC: 101 MMOL/L (ref 95–110)
CHOLEST SERPL-MCNC: 232 MG/DL (ref 120–199)
CHOLEST/HDLC SERPL: 4.2 {RATIO} (ref 2–5)
CO2 SERPL-SCNC: 26 MMOL/L (ref 23–29)
CREAT SERPL-MCNC: 1 MG/DL (ref 0.5–1.4)
EST. GFR  (NO RACE VARIABLE): 52.2 ML/MIN/1.73 M^2
GLUCOSE SERPL-MCNC: 103 MG/DL (ref 70–110)
HDLC SERPL-MCNC: 55 MG/DL (ref 40–75)
HDLC SERPL: 23.7 % (ref 20–50)
LDLC SERPL CALC-MCNC: 133.6 MG/DL (ref 63–159)
NONHDLC SERPL-MCNC: 177 MG/DL
POTASSIUM SERPL-SCNC: 3.9 MMOL/L (ref 3.5–5.1)
PROT SERPL-MCNC: 6.4 G/DL (ref 6–8.4)
SODIUM SERPL-SCNC: 137 MMOL/L (ref 136–145)
TRIGL SERPL-MCNC: 217 MG/DL (ref 30–150)
TSH SERPL DL<=0.005 MIU/L-ACNC: 0.53 UIU/ML (ref 0.4–4)

## 2022-11-21 ENCOUNTER — PES CALL (OUTPATIENT)
Dept: ADMINISTRATIVE | Facility: CLINIC | Age: 87
End: 2022-11-21
Payer: MEDICARE

## 2022-11-29 ENCOUNTER — OFFICE VISIT (OUTPATIENT)
Dept: PODIATRY | Facility: CLINIC | Age: 87
End: 2022-11-29
Payer: MEDICARE

## 2022-11-29 DIAGNOSIS — G60.9 IDIOPATHIC PERIPHERAL NEUROPATHY: ICD-10-CM

## 2022-11-29 DIAGNOSIS — B35.1 ONYCHOMYCOSIS DUE TO DERMATOPHYTE: ICD-10-CM

## 2022-11-29 DIAGNOSIS — M20.42 HAMMER TOES OF BOTH FEET: ICD-10-CM

## 2022-11-29 DIAGNOSIS — M20.41 HAMMER TOES OF BOTH FEET: ICD-10-CM

## 2022-11-29 DIAGNOSIS — I73.9 PERIPHERAL VASCULAR DISEASE: Primary | ICD-10-CM

## 2022-11-29 PROCEDURE — 1159F PR MEDICATION LIST DOCUMENTED IN MEDICAL RECORD: ICD-10-PCS | Mod: CPTII,S$GLB,, | Performed by: PODIATRIST

## 2022-11-29 PROCEDURE — 1159F MED LIST DOCD IN RCRD: CPT | Mod: CPTII,S$GLB,, | Performed by: PODIATRIST

## 2022-11-29 PROCEDURE — 99999 PR PBB SHADOW E&M-EST. PATIENT-LVL III: CPT | Mod: PBBFAC,,, | Performed by: PODIATRIST

## 2022-11-29 PROCEDURE — 99999 PR PBB SHADOW E&M-EST. PATIENT-LVL III: ICD-10-PCS | Mod: PBBFAC,,, | Performed by: PODIATRIST

## 2022-11-29 PROCEDURE — 11721 DEBRIDE NAIL 6 OR MORE: CPT | Mod: Q9,S$GLB,, | Performed by: PODIATRIST

## 2022-11-29 PROCEDURE — 1101F PR PT FALLS ASSESS DOC 0-1 FALLS W/OUT INJ PAST YR: ICD-10-PCS | Mod: CPTII,S$GLB,, | Performed by: PODIATRIST

## 2022-11-29 PROCEDURE — 1101F PT FALLS ASSESS-DOCD LE1/YR: CPT | Mod: CPTII,S$GLB,, | Performed by: PODIATRIST

## 2022-11-29 PROCEDURE — 3288F FALL RISK ASSESSMENT DOCD: CPT | Mod: CPTII,S$GLB,, | Performed by: PODIATRIST

## 2022-11-29 PROCEDURE — 3288F PR FALLS RISK ASSESSMENT DOCUMENTED: ICD-10-PCS | Mod: CPTII,S$GLB,, | Performed by: PODIATRIST

## 2022-11-29 PROCEDURE — 1126F PR PAIN SEVERITY QUANTIFIED, NO PAIN PRESENT: ICD-10-PCS | Mod: CPTII,S$GLB,, | Performed by: PODIATRIST

## 2022-11-29 PROCEDURE — 99499 NO LOS: ICD-10-PCS | Mod: S$GLB,,, | Performed by: PODIATRIST

## 2022-11-29 PROCEDURE — 11721 PR DEBRIDEMENT OF NAILS, 6 OR MORE: ICD-10-PCS | Mod: Q9,S$GLB,, | Performed by: PODIATRIST

## 2022-11-29 PROCEDURE — 1126F AMNT PAIN NOTED NONE PRSNT: CPT | Mod: CPTII,S$GLB,, | Performed by: PODIATRIST

## 2022-11-29 PROCEDURE — 99499 UNLISTED E&M SERVICE: CPT | Mod: S$GLB,,, | Performed by: PODIATRIST

## 2022-11-29 NOTE — PROGRESS NOTES
Subjective:      Patient ID: Silke Dodd is a 89 y.o. female.    Chief Complaint: Nail Care (6/14/22 Roberto)    Silke is a 89 y.o. female who presents to the clinic for evaluation and treatment of high risk feet. 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 The patient's chief complaint consists of toenails in need of trimming.  Denies being painful with wearing shoe gear.  Has not attempted to self treat. Notes occasional neuropathy pain in the feet, however, states this occurs sporadically. Continues to deny symptoms of claudication.  Denies any additional pedal complaints.       PCP: Anibal Fisher DO    Date Last Seen by PCP: 6/22    Past Medical History:   Diagnosis Date    Acute angle-closure glaucoma of right eye     Breast cancer 2015    Cancer     BREAST, UTURINE    Corns and callosities     Depression     Hypertension     Hypothyroid     Osteoporosis, post-menopausal 4/23/2013    Ovarian cancer     Panic disorder        Past Surgical History:   Procedure Laterality Date    CATARACT EXTRACTION Bilateral     corrected for monovision    HYSTERECTOMY  1975    Cancer cells    MASTECTOMY Right 2015    TOTAL ABDOMINAL HYSTERECTOMY W/ BILATERAL SALPINGOOPHORECTOMY  1975       Family History   Problem Relation Age of Onset    Cancer Mother     Breast cancer Mother     Diabetes Father     Stroke Father     Coronary artery disease Father        Social History     Socioeconomic History    Marital status:     Number of children: 3   Tobacco Use    Smoking status: Never    Smokeless tobacco: Never   Substance and Sexual Activity    Alcohol use: Yes     Alcohol/week: 5.0 standard drinks     Types: 5 Glasses of wine per week    Drug use: No    Sexual activity: Never     Birth control/protection: Surgical        Current Outpatient Medications   Medication Sig Dispense Refill    acetaminophen (TYLENOL) 325 MG tablet Take 325 mg by mouth every 6 (six) hours as needed for Pain.      aspirin (ECOTRIN) 81 MG EC tablet Take 81 mg by mouth Daily.      chlorthalidone (HYGROTEN) 25 MG Tab       cholecalciferol, vitamin D3, 125 mcg (5,000 unit) Tab Take 5,000 Units by mouth once daily.      cloNIDine (CATAPRES) 0.1 MG tablet 1 tablet by mouth  as needed every 1 to 2 hours for SBP over 160, max 6 tabs/day      diclofenac sodium (VOLTAREN) 1 % Gel Apply 2 g topically once daily. 20 g 1    EScitalopram oxalate (LEXAPRO) 5 MG Tab Take 5 mg by mouth once daily.      famotidine (PEPCID) 20 MG tablet Take 1 tablet (20 mg total) by mouth 2 (two) times daily. 20 tablet 0    famotidine (PEPCID) 20 MG tablet ONCE DAILY      guaiFENesin (MUCINEX) 600 mg 12 hr tablet Take 1,200 mg by mouth 2 (two) times daily.      levothyroxine (SYNTHROID) 75 MCG tablet TAKE 1 TABLET BY MOUTH EVERY DAY 90 tablet 0    losartan (COZAAR) 100 MG tablet Take 1 tablet (100 mg total) by mouth once daily. (Patient taking differently: Take 150 mg by mouth once daily.) 90 tablet 1    losartan (COZAAR) 50 MG tablet 50 mg.      metoprolol succinate (TOPROL-XL) 25 MG 24 hr tablet       metoprolol succinate (TOPROL-XL) 50 MG 24 hr tablet Take by mouth.      metoprolol succinate 25 mg CSpX Pt stated Take 1 tab in the am, 2 tabs in the evening 270 capsule 1    EScitalopram oxalate (LEXAPRO) 20 MG tablet TAKE 1 TABLET BY MOUTH EVERY DAY 90 tablet 0    ondansetron (ZOFRAN-ODT) 4 MG TbDL Take 1 tablet (4 mg total) by mouth every 6 (six) hours as needed. 12 tablet 0     No current facility-administered medications for this visit.       Review of patient's allergies indicates:  No Known Allergies      Review of Systems   Constitutional: Negative for chills.   Cardiovascular:  Negative for claudication and leg swelling.   Skin:  Positive for color change and nail changes.    Musculoskeletal:  Negative for joint pain and joint swelling.   Gastrointestinal:  Negative for nausea and vomiting.   Neurological:  Positive for numbness and paresthesias.   Psychiatric/Behavioral:  Negative for altered mental status.          Objective:      Physical Exam  Constitutional:       General: She is not in acute distress.     Appearance: She is well-developed. She is not diaphoretic.   Cardiovascular:      Pulses:           Dorsalis pedis pulses are 1+ on the right side and 1+ on the left side.        Posterior tibial pulses are 1+ on the right side and 1+ on the left side.      Comments: CFT 3 seconds bilateral.  Pedal hair growth decreased bilateral.   Mild varicosities noted bilateral.  Mild nonpitting edema noted to bilateral lower extremity.  Toes are cool to touch bilateral.    Musculoskeletal:         General: No tenderness.      Comments: Muscle strength 5/5 in all muscle groups bilateral.  No tenderness nor crepitation with ROM of foot/ankle joints bilateral.  No pain with palpation to bilateral foot and ankle.  Bilateral hallux abducto valgus.  Bilateral semi-rigid contracture of toes 2-5.       Skin:     General: Skin is warm and dry.      Capillary Refill: Capillary refill takes less than 2 seconds.      Coloration: Skin is not pale.      Findings: No abrasion, bruising, burn, ecchymosis, erythema, laceration, lesion, petechiae or rash.      Comments: Pedal skin appears thin bilateral.  Toenails x 10 appear thickened by 2 mm, elongated by 4 mm and discolored with subungual debris. Slight incurvation noted to the lateral aspect of the Lt. 3rd toenail.  No localized sign of infection noted.  Slight hyperpigmentation noted to the tips of bilateral hallux consistent with pressure.    Neurological:      Mental Status: She is alert and oriented to person, place, and time.      Sensory: Sensory deficit present.      Motor: No weakness or atrophy.      Comments: Protective sensation per  [place] monofilament is absent bilateral.  Light touch is absent bilateral.             Assessment:       Encounter Diagnoses   Name Primary?    Peripheral vascular disease Yes    Idiopathic peripheral neuropathy     Hammer toes of both feet     Onychomycosis due to dermatophyte          Plan:       Silke was seen today for nail care.    Diagnoses and all orders for this visit:    Peripheral vascular disease    Idiopathic peripheral neuropathy    Hammer toes of both feet    Onychomycosis due to dermatophyte    I counseled the patient on her conditions, their implications and medical management.    PVD and neuropathy remains stable with today's exam, will continue to monitor.    Advised to continue wearing shoe gear that accommodates for digital deformities.    With patient's permission, nails were aggressively reduced and debrided x 10 to their soft tissue attachment mechanically and with electric , removing all offending nail and debris. Patient relates relief following the procedure. She will continue to monitor the areas daily, inspect her feet, wear protective shoe gear when ambulatory, moisturizer to maintain skin integrity and follow in this office in approximately 3 months, sooner p.r.n.    Narayan Jonas DPM

## 2022-12-01 ENCOUNTER — PATIENT MESSAGE (OUTPATIENT)
Dept: FAMILY MEDICINE | Facility: CLINIC | Age: 87
End: 2022-12-01
Payer: MEDICARE

## 2022-12-06 ENCOUNTER — TELEPHONE (OUTPATIENT)
Dept: RADIOLOGY | Facility: HOSPITAL | Age: 87
End: 2022-12-06
Payer: MEDICARE

## 2022-12-06 NOTE — TELEPHONE ENCOUNTER
Spoke with patient, scheduled diag mammo, left breast only as patient has had right mastectomy, on Friday 12/16/22 at 1 pm at [address] [facility]

## 2022-12-12 ENCOUNTER — PES CALL (OUTPATIENT)
Dept: ADMINISTRATIVE | Facility: CLINIC | Age: 87
End: 2022-12-12
Payer: MEDICARE

## 2022-12-16 ENCOUNTER — HOSPITAL ENCOUNTER (OUTPATIENT)
Dept: RADIOLOGY | Facility: HOSPITAL | Age: 87
Discharge: HOME | End: 2022-12-16
Attending: NURSE PRACTITIONER
Payer: MEDICARE

## 2022-12-16 DIAGNOSIS — Z85.3 HISTORY OF RIGHT BREAST CANCER: ICD-10-CM

## 2022-12-16 PROCEDURE — 77065 DX MAMMO INCL CAD UNI: CPT | Mod: 26,HCNC,LT, | Performed by: RADIOLOGY

## 2022-12-16 PROCEDURE — 77061 MAMMO DIGITAL DIAGNOSTIC LEFT WITH TOMO: ICD-10-PCS | Mod: 26,HCNC,LT, | Performed by: RADIOLOGY

## 2022-12-16 PROCEDURE — 77061 BREAST TOMOSYNTHESIS UNI: CPT | Mod: 26,HCNC,LT, | Performed by: RADIOLOGY

## 2022-12-16 PROCEDURE — 77065 MAMMO DIGITAL DIAGNOSTIC LEFT WITH TOMO: ICD-10-PCS | Mod: 26,HCNC,LT, | Performed by: RADIOLOGY

## 2022-12-16 PROCEDURE — 77065 DX MAMMO INCL CAD UNI: CPT | Mod: TC,HCNC,PO,LT

## 2023-01-11 ENCOUNTER — PES CALL (OUTPATIENT)
Dept: ADMINISTRATIVE | Facility: CLINIC | Age: 88
End: 2023-01-11
Payer: MEDICARE

## 2023-01-11 ENCOUNTER — TELEPHONE (OUTPATIENT)
Dept: ADMINISTRATIVE | Facility: CLINIC | Age: 88
End: 2023-01-11
Payer: MEDICARE

## 2023-01-11 NOTE — TELEPHONE ENCOUNTER
Spoke with Patient;s daughter, she needs to reschedule AWV home visit, she can not do Fridays, kait.

## 2023-01-11 NOTE — TELEPHONE ENCOUNTER
----- Message from Steff Benavides, Patient Care Assistant sent at 1/11/2023  3:23 PM CST -----  Contact: Janneth Alvarenga daughter is calling because she thought the HV was for the 12th and the 13th is too early. Please call back to advise [phone]  thanks

## 2023-01-26 ENCOUNTER — PATIENT MESSAGE (OUTPATIENT)
Dept: PODIATRY | Facility: CLINIC | Age: 88
End: 2023-01-26
Payer: MEDICARE

## 2023-02-07 DIAGNOSIS — Z00.00 ENCOUNTER FOR MEDICARE ANNUAL WELLNESS EXAM: ICD-10-CM

## 2023-02-09 DIAGNOSIS — Z00.00 ENCOUNTER FOR MEDICARE ANNUAL WELLNESS EXAM: ICD-10-CM

## 2023-02-22 ENCOUNTER — OFFICE VISIT (OUTPATIENT)
Dept: HOME HEALTH SERVICES | Facility: CLINIC | Age: 88
End: 2023-02-22
Payer: MEDICARE

## 2023-02-22 VITALS
BODY MASS INDEX: 25.95 KG/M2 | OXYGEN SATURATION: 96 % | HEART RATE: 53 BPM | DIASTOLIC BLOOD PRESSURE: 52 MMHG | SYSTOLIC BLOOD PRESSURE: 103 MMHG | WEIGHT: 152 LBS | HEIGHT: 64 IN

## 2023-02-22 DIAGNOSIS — Z85.43 HISTORY OF OVARIAN CANCER: ICD-10-CM

## 2023-02-22 DIAGNOSIS — G60.9 IDIOPATHIC PERIPHERAL NEUROPATHY: ICD-10-CM

## 2023-02-22 DIAGNOSIS — E03.9 HYPOTHYROIDISM, UNSPECIFIED TYPE: ICD-10-CM

## 2023-02-22 DIAGNOSIS — M81.0 OSTEOPOROSIS, POST-MENOPAUSAL: ICD-10-CM

## 2023-02-22 DIAGNOSIS — I77.9 CAROTID ARTERY DISEASE, UNSPECIFIED LATERALITY, UNSPECIFIED TYPE: ICD-10-CM

## 2023-02-22 DIAGNOSIS — N39.46 MIXED INCONTINENCE URGE AND STRESS: ICD-10-CM

## 2023-02-22 DIAGNOSIS — Z85.3 HISTORY OF BREAST CANCER: ICD-10-CM

## 2023-02-22 DIAGNOSIS — I73.9 PERIPHERAL VASCULAR DISEASE: ICD-10-CM

## 2023-02-22 DIAGNOSIS — Z00.00 ENCOUNTER FOR PREVENTIVE HEALTH EXAMINATION: Primary | ICD-10-CM

## 2023-02-22 DIAGNOSIS — K21.9 GASTROESOPHAGEAL REFLUX DISEASE, UNSPECIFIED WHETHER ESOPHAGITIS PRESENT: ICD-10-CM

## 2023-02-22 DIAGNOSIS — N18.31 STAGE 3A CHRONIC KIDNEY DISEASE: ICD-10-CM

## 2023-02-22 DIAGNOSIS — I10 ESSENTIAL HYPERTENSION: ICD-10-CM

## 2023-02-22 DIAGNOSIS — I70.0 AORTIC ATHEROSCLEROSIS: ICD-10-CM

## 2023-02-22 PROCEDURE — 1160F PR REVIEW ALL MEDS BY PRESCRIBER/CLIN PHARMACIST DOCUMENTED: ICD-10-PCS | Mod: CPTII,S$GLB,, | Performed by: NURSE PRACTITIONER

## 2023-02-22 PROCEDURE — 1101F PT FALLS ASSESS-DOCD LE1/YR: CPT | Mod: CPTII,S$GLB,, | Performed by: NURSE PRACTITIONER

## 2023-02-22 PROCEDURE — G0439 PPPS, SUBSEQ VISIT: HCPCS | Mod: S$GLB,,, | Performed by: NURSE PRACTITIONER

## 2023-02-22 PROCEDURE — 3288F PR FALLS RISK ASSESSMENT DOCUMENTED: ICD-10-PCS | Mod: CPTII,S$GLB,, | Performed by: NURSE PRACTITIONER

## 2023-02-22 PROCEDURE — 1101F PR PT FALLS ASSESS DOC 0-1 FALLS W/OUT INJ PAST YR: ICD-10-PCS | Mod: CPTII,S$GLB,, | Performed by: NURSE PRACTITIONER

## 2023-02-22 PROCEDURE — 1159F MED LIST DOCD IN RCRD: CPT | Mod: CPTII,S$GLB,, | Performed by: NURSE PRACTITIONER

## 2023-02-22 PROCEDURE — 1159F PR MEDICATION LIST DOCUMENTED IN MEDICAL RECORD: ICD-10-PCS | Mod: CPTII,S$GLB,, | Performed by: NURSE PRACTITIONER

## 2023-02-22 PROCEDURE — G0439 PR MEDICARE ANNUAL WELLNESS SUBSEQUENT VISIT: ICD-10-PCS | Mod: S$GLB,,, | Performed by: NURSE PRACTITIONER

## 2023-02-22 PROCEDURE — 3288F FALL RISK ASSESSMENT DOCD: CPT | Mod: CPTII,S$GLB,, | Performed by: NURSE PRACTITIONER

## 2023-02-22 PROCEDURE — 1160F RVW MEDS BY RX/DR IN RCRD: CPT | Mod: CPTII,S$GLB,, | Performed by: NURSE PRACTITIONER

## 2023-02-25 NOTE — PATIENT INSTRUCTIONS
Counseling and Referral of Other Preventative  (Italic type indicates deductible and co-insurance are waived)    Patient Name: Silke Dodd  Today's Date: 2/25/2023    Health Maintenance       Date Due Completion Date    COVID-19 Vaccine (5 - Booster for Pfizer series) 06/28/2022 5/3/2022    Lipid Panel 11/08/2027 11/8/2022    TETANUS VACCINE 04/07/2032 4/7/2022        No orders of the defined types were placed in this encounter.    The following information is provided to all patients.  This information is to help you find resources for any of the problems found today that may be affecting your health:                Living healthy guide: www.[facility].[place].[facility]      Understanding Diabetes: www.diabetes.org      Eating healthy: www.cdc.gov/healthyweight      [facility] home safety checklist: www.cdc.gov/steadi/patient.html      Agency on Aging: www.goea.[place]      Alcoholics anonymous (AA): www.aa.org      Physical Activity: www.meghna.nih.gov/lw5huud      Tobacco use: www.quitwithusla.org

## 2023-02-25 NOTE — PROGRESS NOTES
"  Silke Dodd presented for a  Medicare AWV and comprehensive Health Risk Assessment today. The following components were reviewed and updated:    Medical history  Family History  Social history  Allergies and Current Medications  Health Risk Assessment  Health Maintenance  Care Team         ** See Completed Assessments for Annual Wellness Visit within the encounter summary.**         The following assessments were completed:  Living Situation  CAGE  Depression Screening  Timed Get Up and Go  Whisper Test  Cognitive Function Screening  Nutrition Screening  ADL Screening  PAQ Screening        Vitals:    02/22/23 1247   BP: (!) 103/52   Pulse: (!) 53   SpO2: 96%   Weight: 68.9 kg (152 lb)   Height: 5' 4" (1.626 m)     Body mass index is 26.09 kg/m².  Physical Exam  Constitutional:       Appearance: Normal appearance.   HENT:      Head: Normocephalic and atraumatic.      Nose: Nose normal.   Eyes:      Extraocular Movements: Extraocular movements intact.      Pupils: Pupils are equal, round, and reactive to light.   Cardiovascular:      Rate and Rhythm: Normal rate and regular rhythm.      Pulses: Normal pulses.      Heart sounds: Normal heart sounds.   Pulmonary:      Effort: Pulmonary effort is normal.      Breath sounds: Normal breath sounds.   Musculoskeletal:      Cervical back: Normal range of motion and neck supple.   Neurological:      General: No focal deficit present.      Mental Status: She is alert and oriented to person, place, and time.           Diagnoses and health risks identified today and associated recommendations/orders:    1. Encounter for preventive health examination  Awv completed    2. Peripheral vascular disease  Chronic and stable. Continue current treatment. Follow with PCP.      3. Stage 3a chronic kidney disease  Chronic and stable. Continue current treatment. Follow with PCP.  Stable     4. Idiopathic peripheral neuropathy  Chronic and stable. Continue current treatment. Follow with " PCP.      5. Essential hypertension  Chronic and stable. Continue current treatment. Follow with PCP.  On meds      6. Carotid artery disease, unspecified laterality, unspecified type  Chronic and stable. Continue current treatment. Follow with PCP.      7. Aortic atherosclerosis  Chronic and stable. Continue current treatment. Follow with PCP.      8. Mixed incontinence urge and stress  Chronic and stable. Continue current treatment. Follow with PCP.      9. History of breast cancer  Chronic and stable. Continue current treatment. Follow with PCP.      10. History of ovarian cancer  Chronic and stable. Continue current treatment. Follow with PCP.      11. Hypothyroidism, unspecified type  Chronic and stable. Continue current treatment. Follow with PCP.      12. Gastroesophageal reflux disease, unspecified whether esophagitis present  Chronic and stable. Continue current treatment. Follow with PCP.  On meds      13. Osteoporosis, post-menopausal  Chronic and stable. Continue current treatment. Follow with PCP.        Provided Silke with a 5-10 year written screening schedule and personal prevention plan. Recommendations were developed using the USPSTF age appropriate recommendations. Education, counseling, and referrals were provided as needed. After Visit Summary printed and given to patient which includes a list of additional screenings\tests needed.    Fu in 1 yr for CINDY Langley NP  I offered to discuss advanced care planning, including how to pick a person who would make decisions for you if you were unable to make them for yourself, called a health care power of , and what kind of decisions you might make such as use of life sustaining treatments such as ventilators and tube feeding when faced with a life limiting illness recorded on a living will that they will need to know. (How you want to be cared for as you near the end of your natural life)     X  Patient has advanced  directives written and agrees to provide copies to the institution.

## 2023-03-06 ENCOUNTER — PATIENT MESSAGE (OUTPATIENT)
Dept: FAMILY MEDICINE | Facility: CLINIC | Age: 88
End: 2023-03-06
Payer: MEDICARE

## 2023-03-07 ENCOUNTER — OFFICE VISIT (OUTPATIENT)
Dept: PODIATRY | Facility: CLINIC | Age: 88
End: 2023-03-07
Payer: MEDICARE

## 2023-03-07 VITALS — BODY MASS INDEX: 25.93 KG/M2 | WEIGHT: 151.88 LBS | HEIGHT: 64 IN

## 2023-03-07 DIAGNOSIS — M20.41 HAMMER TOES OF BOTH FEET: ICD-10-CM

## 2023-03-07 DIAGNOSIS — I73.9 PERIPHERAL VASCULAR DISEASE: Primary | ICD-10-CM

## 2023-03-07 DIAGNOSIS — G60.9 IDIOPATHIC PERIPHERAL NEUROPATHY: ICD-10-CM

## 2023-03-07 DIAGNOSIS — M20.42 HAMMER TOES OF BOTH FEET: ICD-10-CM

## 2023-03-07 DIAGNOSIS — B35.1 ONYCHOMYCOSIS DUE TO DERMATOPHYTE: ICD-10-CM

## 2023-03-07 PROCEDURE — 11721 DEBRIDE NAIL 6 OR MORE: CPT | Mod: Q9,HCNC,S$GLB, | Performed by: PODIATRIST

## 2023-03-07 PROCEDURE — 3288F PR FALLS RISK ASSESSMENT DOCUMENTED: ICD-10-PCS | Mod: HCNC,CPTII,S$GLB, | Performed by: PODIATRIST

## 2023-03-07 PROCEDURE — 99999 PR PBB SHADOW E&M-EST. PATIENT-LVL II: ICD-10-PCS | Mod: PBBFAC,HCNC,, | Performed by: PODIATRIST

## 2023-03-07 PROCEDURE — 11721 PR DEBRIDEMENT OF NAILS, 6 OR MORE: ICD-10-PCS | Mod: Q9,HCNC,S$GLB, | Performed by: PODIATRIST

## 2023-03-07 PROCEDURE — 99999 PR PBB SHADOW E&M-EST. PATIENT-LVL II: CPT | Mod: PBBFAC,HCNC,, | Performed by: PODIATRIST

## 2023-03-07 PROCEDURE — 99213 OFFICE O/P EST LOW 20 MIN: CPT | Mod: 25,HCNC,S$GLB, | Performed by: PODIATRIST

## 2023-03-07 PROCEDURE — 1101F PR PT FALLS ASSESS DOC 0-1 FALLS W/OUT INJ PAST YR: ICD-10-PCS | Mod: HCNC,CPTII,S$GLB, | Performed by: PODIATRIST

## 2023-03-07 PROCEDURE — 3288F FALL RISK ASSESSMENT DOCD: CPT | Mod: HCNC,CPTII,S$GLB, | Performed by: PODIATRIST

## 2023-03-07 PROCEDURE — 1126F PR PAIN SEVERITY QUANTIFIED, NO PAIN PRESENT: ICD-10-PCS | Mod: HCNC,CPTII,S$GLB, | Performed by: PODIATRIST

## 2023-03-07 PROCEDURE — 99213 PR OFFICE/OUTPT VISIT, EST, LEVL III, 20-29 MIN: ICD-10-PCS | Mod: 25,HCNC,S$GLB, | Performed by: PODIATRIST

## 2023-03-07 PROCEDURE — 1101F PT FALLS ASSESS-DOCD LE1/YR: CPT | Mod: HCNC,CPTII,S$GLB, | Performed by: PODIATRIST

## 2023-03-07 PROCEDURE — 1126F AMNT PAIN NOTED NONE PRSNT: CPT | Mod: HCNC,CPTII,S$GLB, | Performed by: PODIATRIST

## 2023-03-07 NOTE — PROGRESS NOTES
Subjective:      Patient ID: Silke Dodd is a 89 y.o. female.    Chief Complaint: No chief complaint on file.      Silke is a 89 y.o. female who presents to the clinic for evaluation and treatment of high risk feet. 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 The patient's chief complaint consists of toenails in need of trimming.  Denies being painful with wearing shoe gear.  Has not attempted to self treat.  Denies experiencing neuropathy pain with today's exam.   Continues to deny symptoms of claudication.  Denies any additional pedal complaints.       PCP: Anibal Fisher,     Date Last Seen by PCP: 2/23    Past Medical History:   Diagnosis Date    Acute angle-closure glaucoma of right eye     Breast cancer 2015    Cancer     BREAST, UTURINE    Corns and callosities     Depression     Hypertension     Hypothyroid     Osteoporosis, post-menopausal 4/23/2013    Ovarian cancer     Panic disorder        Past Surgical History:   Procedure Laterality Date    CATARACT EXTRACTION Bilateral     corrected for monovision    HYSTERECTOMY  1975    Cancer cells    MASTECTOMY Right 2015    TOTAL ABDOMINAL HYSTERECTOMY W/ BILATERAL SALPINGOOPHORECTOMY  1975       Family History   Problem Relation Age of Onset    Cancer Mother     Breast cancer Mother     Diabetes Father     Stroke Father     Coronary artery disease Father        Social History     Socioeconomic History    Marital status:     Number of children: 3   Tobacco Use    Smoking status: Never    Smokeless tobacco: Never   Substance and Sexual Activity    Alcohol use: Yes     Alcohol/week: 5.0 standard drinks     Types: 5 Glasses of wine per week    Drug use: No    Sexual activity: Never     Birth control/protection: Surgical     Social Determinants of Health      Financial Resource Strain: Low Risk     Difficulty of Paying Living Expenses: Not very hard   Food Insecurity: No Food Insecurity    Worried About Running Out of Food in the Last Year: Never true    Ran Out of Food in the Last Year: Never true   Transportation Needs: No Transportation Needs    Lack of Transportation (Medical): No    Lack of Transportation (Non-Medical): No   Physical Activity: Sufficiently Active    Days of Exercise per Week: 5 days    Minutes of Exercise per Session: 50 min   Stress: Stress Concern Present    Feeling of Stress : To some extent   Social Connections: Moderately Integrated    Frequency of Communication with Friends and Family: Three times a week    Frequency of Social Gatherings with Friends and Family: Twice a week    Attends [employer] Services: More than 4 times per year    Active Member of Clubs or Organizations: Yes    Attends Club or Organization Meetings: More than 4 times per year    Marital Status:    Housing Stability: Unknown    Unable to Pay for Housing in the Last Year: No    Unstable Housing in the Last Year: No       Current Outpatient Medications   Medication Sig Dispense Refill    acetaminophen (TYLENOL) 325 MG tablet Take 325 mg by mouth every 6 (six) hours as needed for Pain.      aspirin (ECOTRIN) 81 MG EC tablet Take 81 mg by mouth Daily.      chlorthalidone (HYGROTEN) 25 MG Tab 25 mg. M, W, F only      cholecalciferol, vitamin D3, 125 mcg (5,000 unit) Tab Take 5,000 Units by mouth once daily.      cloNIDine (CATAPRES) 0.1 MG tablet 1 tablet by mouth  as needed every 1 to 2 hours for SBP over 160, max 6 tabs/day      diclofenac sodium (VOLTAREN) 1 % Gel Apply 2 g topically once daily. 20 g 1    EScitalopram oxalate (LEXAPRO) 5 MG Tab Take 5 mg by mouth once daily.      famotidine (PEPCID) 20 MG tablet Take 1 tablet (20 mg total) by mouth 2 (two) times daily. 20 tablet 0    famotidine (PEPCID) 20 MG tablet ONCE DAILY      guaiFENesin (MUCINEX) 600 mg 12  hr tablet Take 1,200 mg by mouth 2 (two) times daily.      levothyroxine (SYNTHROID) 75 MCG tablet TAKE 1 TABLET BY MOUTH EVERY DAY 90 tablet 0    losartan (COZAAR) 100 MG tablet Take 1 tablet (100 mg total) by mouth once daily. (Patient taking differently: Take 100 mg by mouth every evening.) 90 tablet 1    losartan (COZAAR) 50 MG tablet Take 50 mg by mouth once daily. am      metoprolol succinate (TOPROL-XL) 25 MG 24 hr tablet       metoprolol succinate (TOPROL-XL) 50 MG 24 hr tablet Take by mouth.       No current facility-administered medications for this visit.       Review of patient's allergies indicates:  No Known Allergies      Review of Systems   Constitutional: Negative for chills.   Cardiovascular:  Negative for claudication and leg swelling.   Skin:  Positive for color change and nail changes.   Musculoskeletal:  Negative for joint pain and joint swelling.   Gastrointestinal:  Negative for nausea and vomiting.   Neurological:  Positive for numbness and paresthesias.   Psychiatric/Behavioral:  Negative for altered mental status.          Objective:      Physical Exam  Constitutional:       General: She is not in acute distress.     Appearance: She is well-developed. She is not diaphoretic.   Cardiovascular:      Pulses:           Dorsalis pedis pulses are 1+ on the right side and 1+ on the left side.        Posterior tibial pulses are 1+ on the right side and 1+ on the left side.      Comments: CFT 3 seconds bilateral.  Pedal hair growth decreased bilateral.   Mild varicosities noted bilateral.  Mild nonpitting edema noted to bilateral lower extremity.  Toes are cool to touch bilateral.    Musculoskeletal:         General: No tenderness.      Comments: Muscle strength 5/5 in all muscle groups bilateral.  No tenderness nor crepitation with ROM of foot/ankle joints bilateral.  No pain with palpation to bilateral foot and ankle.  Bilateral hallux abducto valgus.  Bilateral semi-rigid contracture of toes  2-5.       Skin:     General: Skin is warm and dry.      Capillary Refill: Capillary refill takes less than 2 seconds.      Coloration: Skin is not pale.      Findings: No abrasion, bruising, burn, ecchymosis, erythema, laceration, lesion, petechiae or rash.      Comments: Pedal skin appears thin bilateral.  Toenails x 10 appear thickened by 2 mm, elongated by 4 mm and discolored with subungual debris.    Neurological:      Mental Status: She is alert and oriented to person, place, and time.      Sensory: Sensory deficit present.      Motor: No weakness or atrophy.      Comments: Protective sensation per [place] monofilament is absent bilateral.  Light touch is absent bilateral.             Assessment:       Encounter Diagnoses   Name Primary?    Peripheral vascular disease Yes    Idiopathic peripheral neuropathy     Hammer toes of both feet     Onychomycosis due to dermatophyte          Plan:       Diagnoses and all orders for this visit:    Peripheral vascular disease    Idiopathic peripheral neuropathy    Hammer toes of both feet    Onychomycosis due to dermatophyte    I counseled the patient on her conditions, their implications and medical management.    PVD and neuropathy remains stable with today's exam, will continue to monitor.    Advised to continue wearing shoe gear that accommodates for digital deformities.    With patient's permission, nails were aggressively reduced and debrided x 10 to their soft tissue attachment mechanically and with electric , removing all offending nail and debris. Patient relates relief following the procedure. She will continue to monitor the areas daily, inspect her feet, wear protective shoe gear when ambulatory, moisturizer to maintain skin integrity and follow in this office in approximately 3 months, sooner p.r.n.    Narayan Jonas DPM

## 2023-05-08 ENCOUNTER — OFFICE VISIT (OUTPATIENT)
Dept: FAMILY MEDICINE | Facility: CLINIC | Age: 88
End: 2023-05-08
Payer: MEDICARE

## 2023-05-08 VITALS
OXYGEN SATURATION: 95 % | DIASTOLIC BLOOD PRESSURE: 62 MMHG | WEIGHT: 155.63 LBS | HEART RATE: 70 BPM | HEIGHT: 64 IN | BODY MASS INDEX: 26.57 KG/M2 | SYSTOLIC BLOOD PRESSURE: 131 MMHG

## 2023-05-08 DIAGNOSIS — E03.9 HYPOTHYROIDISM, UNSPECIFIED TYPE: ICD-10-CM

## 2023-05-08 DIAGNOSIS — N18.31 STAGE 3A CHRONIC KIDNEY DISEASE: ICD-10-CM

## 2023-05-08 DIAGNOSIS — I10 ESSENTIAL HYPERTENSION: ICD-10-CM

## 2023-05-08 DIAGNOSIS — Z00.00 ANNUAL PHYSICAL EXAM: Primary | ICD-10-CM

## 2023-05-08 PROCEDURE — 1101F PT FALLS ASSESS-DOCD LE1/YR: CPT | Mod: HCNC,CPTII,S$GLB, | Performed by: INTERNAL MEDICINE

## 2023-05-08 PROCEDURE — 99999 PR PBB SHADOW E&M-EST. PATIENT-LVL III: CPT | Mod: PBBFAC,HCNC,, | Performed by: INTERNAL MEDICINE

## 2023-05-08 PROCEDURE — 99214 OFFICE O/P EST MOD 30 MIN: CPT | Mod: HCNC,S$GLB,, | Performed by: INTERNAL MEDICINE

## 2023-05-08 PROCEDURE — 1160F RVW MEDS BY RX/DR IN RCRD: CPT | Mod: HCNC,CPTII,S$GLB, | Performed by: INTERNAL MEDICINE

## 2023-05-08 PROCEDURE — 99214 PR OFFICE/OUTPT VISIT, EST, LEVL IV, 30-39 MIN: ICD-10-PCS | Mod: HCNC,S$GLB,, | Performed by: INTERNAL MEDICINE

## 2023-05-08 PROCEDURE — 1101F PR PT FALLS ASSESS DOC 0-1 FALLS W/OUT INJ PAST YR: ICD-10-PCS | Mod: HCNC,CPTII,S$GLB, | Performed by: INTERNAL MEDICINE

## 2023-05-08 PROCEDURE — 1126F PR PAIN SEVERITY QUANTIFIED, NO PAIN PRESENT: ICD-10-PCS | Mod: HCNC,CPTII,S$GLB, | Performed by: INTERNAL MEDICINE

## 2023-05-08 PROCEDURE — 1126F AMNT PAIN NOTED NONE PRSNT: CPT | Mod: HCNC,CPTII,S$GLB, | Performed by: INTERNAL MEDICINE

## 2023-05-08 PROCEDURE — 1159F PR MEDICATION LIST DOCUMENTED IN MEDICAL RECORD: ICD-10-PCS | Mod: HCNC,CPTII,S$GLB, | Performed by: INTERNAL MEDICINE

## 2023-05-08 PROCEDURE — 3288F PR FALLS RISK ASSESSMENT DOCUMENTED: ICD-10-PCS | Mod: HCNC,CPTII,S$GLB, | Performed by: INTERNAL MEDICINE

## 2023-05-08 PROCEDURE — 3288F FALL RISK ASSESSMENT DOCD: CPT | Mod: HCNC,CPTII,S$GLB, | Performed by: INTERNAL MEDICINE

## 2023-05-08 PROCEDURE — 1159F MED LIST DOCD IN RCRD: CPT | Mod: HCNC,CPTII,S$GLB, | Performed by: INTERNAL MEDICINE

## 2023-05-08 PROCEDURE — 99999 PR PBB SHADOW E&M-EST. PATIENT-LVL III: ICD-10-PCS | Mod: PBBFAC,HCNC,, | Performed by: INTERNAL MEDICINE

## 2023-05-08 PROCEDURE — 1160F PR REVIEW ALL MEDS BY PRESCRIBER/CLIN PHARMACIST DOCUMENTED: ICD-10-PCS | Mod: HCNC,CPTII,S$GLB, | Performed by: INTERNAL MEDICINE

## 2023-05-08 NOTE — PROGRESS NOTES
" Patient ID: Silke Dodd is a 89 y.o. female.    Chief Complaint: Hypertension (6 month fu)        HPI - Assessment - Plan      She is at [employer]. Exercising daily. Doing well using walker.     Hypertension- follows with cardiology (Dr. Champion), blood pressure is controlled. Takes clonidine at night for night time blood pressures prn   - continue regimen    Hypothyroid- tsh is range  - continue med    CKD- stable  - monitor, maintain hydration. Takes tylenol for pain.     Anxiety stable on Lexapro   - continue med.     Follows with Dr. Mathias for history of breast cancer. "History of breast cancer in 2015. S/p Right Mastectomy with Right SLNB"    1. Annual physical exam    2. Essential hypertension    3. Hypothyroidism, unspecified type    4. Stage 3a chronic kidney disease          Review of Systems   Constitutional:  Negative for fever.   Respiratory:  Negative for shortness of breath.    Cardiovascular:  Negative for chest pain.   Gastrointestinal:  Negative for abdominal pain.      Objective     Vitals:    05/08/23 1330   BP: 131/62   Pulse: 70     Wt Readings from Last 3 Encounters:   05/08/23 1330 70.6 kg (155 lb 10.3 oz)   03/07/23 1049 68.9 kg (151 lb 14.4 oz)   02/22/23 1247 68.9 kg (152 lb)      Body mass index is 26.72 kg/m².   Physical Exam  Cardiovascular:      Rate and Rhythm: Normal rate and regular rhythm.      Heart sounds: No murmur heard.    No gallop.   Pulmonary:      Breath sounds: Normal breath sounds. No wheezing or rhonchi.   Abdominal:      Palpations: Abdomen is soft.      Tenderness: There is no abdominal tenderness.        Orders     Slike was seen today for hypertension.    Diagnoses and all orders for this visit:    Annual physical exam    Essential hypertension    Hypothyroidism, unspecified type    Stage 3a chronic kidney disease            Hypertension Medications               chlorthalidone (HYGROTEN) 25 MG Tab 25 mg. M, W, F only    cloNIDine (CATAPRES) 0.1 MG tablet 1 " tablet by mouth  as needed every 1 to 2 hours for SBP over 160, max 6 tabs/day    losartan (COZAAR) 100 MG tablet Take 1 tablet (100 mg total) by mouth once daily.    losartan (COZAAR) 50 MG tablet Take 50 mg by mouth once daily. am    metoprolol succinate (TOPROL-XL) 25 MG 24 hr tablet     metoprolol succinate (TOPROL-XL) 50 MG 24 hr tablet Take by mouth.              Medication List with Changes/Refills   Current Medications    ACETAMINOPHEN (TYLENOL) 325 MG TABLET    Take 325 mg by mouth every 6 (six) hours as needed for Pain.    ASPIRIN (ECOTRIN) 81 MG EC TABLET    Take 81 mg by mouth Daily.    CHLORTHALIDONE (HYGROTEN) 25 MG TAB    25 mg. M, W, F only    CLONIDINE (CATAPRES) 0.1 MG TABLET    1 tablet by mouth  as needed every 1 to 2 hours for SBP over 160, max 6 tabs/day    DICLOFENAC SODIUM (VOLTAREN) 1 % GEL    Apply 2 g topically once daily.    ESCITALOPRAM OXALATE (LEXAPRO) 5 MG TAB    Take 5 mg by mouth once daily.    FAMOTIDINE (PEPCID) 20 MG TABLET    Take 1 tablet (20 mg total) by mouth 2 (two) times daily.    FAMOTIDINE (PEPCID) 20 MG TABLET    ONCE DAILY    GUAIFENESIN (MUCINEX) 600 MG 12 HR TABLET    Take 1,200 mg by mouth 2 (two) times daily.    LEVOTHYROXINE (SYNTHROID) 75 MCG TABLET    TAKE 1 TABLET BY MOUTH EVERY DAY    LOSARTAN (COZAAR) 100 MG TABLET    Take 1 tablet (100 mg total) by mouth once daily.    LOSARTAN (COZAAR) 50 MG TABLET    Take 50 mg by mouth once daily. am    METOPROLOL SUCCINATE (TOPROL-XL) 25 MG 24 HR TABLET        METOPROLOL SUCCINATE (TOPROL-XL) 50 MG 24 HR TABLET    Take by mouth.   Discontinued Medications    CHOLECALCIFEROL, VITAMIN D3, 125 MCG (5,000 UNIT) TAB    Take 5,000 Units by mouth once daily.       I personally reviewed past medical, family and social history.    Patient Active Problem List   Diagnosis    Dystrophic nail    Onychomycosis    Hypothyroid    Osteoporosis, post-menopausal    Hammer toes of both feet    History of breast cancer    Peripheral vascular  disease    Onychomycosis due to dermatophyte    Choroidal neovascularization, left eye    Posterior vitreous detachment, both eyes    Aortic atherosclerosis    Carotid arterial disease    Essential hypertension    Gastroesophageal reflux disease    Nonulcer dyspepsia    Idiopathic peripheral neuropathy    Greater trochanteric bursitis of left hip    History of ovarian cancer    Stage 3a chronic kidney disease    Mixed incontinence urge and stress    History of subdural hematoma

## 2023-06-15 ENCOUNTER — OFFICE VISIT (OUTPATIENT)
Dept: PODIATRY | Facility: CLINIC | Age: 88
End: 2023-06-15
Payer: MEDICARE

## 2023-06-15 VITALS — HEIGHT: 64 IN | WEIGHT: 155.63 LBS | BODY MASS INDEX: 26.57 KG/M2

## 2023-06-15 DIAGNOSIS — M20.41 HAMMER TOES OF BOTH FEET: ICD-10-CM

## 2023-06-15 DIAGNOSIS — M20.42 HAMMER TOES OF BOTH FEET: ICD-10-CM

## 2023-06-15 DIAGNOSIS — G60.9 IDIOPATHIC PERIPHERAL NEUROPATHY: ICD-10-CM

## 2023-06-15 DIAGNOSIS — I73.9 PERIPHERAL VASCULAR DISEASE: Primary | ICD-10-CM

## 2023-06-15 DIAGNOSIS — B35.1 ONYCHOMYCOSIS DUE TO DERMATOPHYTE: ICD-10-CM

## 2023-06-15 PROCEDURE — 99999 PR PBB SHADOW E&M-EST. PATIENT-LVL II: CPT | Mod: PBBFAC,,, | Performed by: PODIATRIST

## 2023-06-15 PROCEDURE — 99499 UNLISTED E&M SERVICE: CPT | Mod: S$GLB,,, | Performed by: PODIATRIST

## 2023-06-15 PROCEDURE — 99499 NO LOS: ICD-10-PCS | Mod: S$GLB,,, | Performed by: PODIATRIST

## 2023-06-15 PROCEDURE — 11721 DEBRIDE NAIL 6 OR MORE: CPT | Mod: Q9,S$GLB,, | Performed by: PODIATRIST

## 2023-06-15 PROCEDURE — 99999 PR PBB SHADOW E&M-EST. PATIENT-LVL II: ICD-10-PCS | Mod: PBBFAC,,, | Performed by: PODIATRIST

## 2023-06-15 PROCEDURE — 11721 PR DEBRIDEMENT OF NAILS, 6 OR MORE: ICD-10-PCS | Mod: Q9,S$GLB,, | Performed by: PODIATRIST

## 2023-06-16 NOTE — PROGRESS NOTES
Subjective:      Patient ID: Silke Dodd is a 89 y.o. female.    Chief Complaint: Peripheral Vascular Disease (Phillip 5/23)      Silke is a 89 y.o. female who presents to the clinic for evaluation and treatment of high risk feet. 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 The patient's chief complaint consists of toenails in need of trimming.  Denies being painful with wearing shoe gear.  Has not attempted to self treat.  Denies experiencing neuropathy pain with today's exam.  Has not experienced rest pain nor claudication.  Denies any additional pedal complaints.       PCP: Anibal Fisher, DO    Date Last Seen by PCP: 2/23    Past Medical History:   Diagnosis Date    Acute angle-closure glaucoma of right eye     Breast cancer 2015    Cancer     BREAST, UTURINE    Corns and callosities     Depression     Hypertension     Hypothyroid     Osteoporosis, post-menopausal 4/23/2013    Ovarian cancer     Panic disorder        Past Surgical History:   Procedure Laterality Date    CATARACT EXTRACTION Bilateral     corrected for monovision    HYSTERECTOMY  1975    Cancer cells    MASTECTOMY Right 2015    TOTAL ABDOMINAL HYSTERECTOMY W/ BILATERAL SALPINGOOPHORECTOMY  1975       Family History   Problem Relation Age of Onset    Cancer Mother     Breast cancer Mother     Diabetes Father     Stroke Father     Coronary artery disease Father        Social History     Socioeconomic History    Marital status:     Number of children: 3   Tobacco Use    Smoking status: Never    Smokeless tobacco: Never   Substance and Sexual Activity    Alcohol use: Yes     Alcohol/week: 5.0 standard drinks     Types: 5 Glasses of wine per week    Drug use: No    Sexual activity: Never     Birth control/protection: Surgical     Social  Determinants of Health     Financial Resource Strain: Low Risk     Difficulty of Paying Living Expenses: Not very hard   Food Insecurity: No Food Insecurity    Worried About Running Out of Food in the Last Year: Never true    Ran Out of Food in the Last Year: Never true   Transportation Needs: No Transportation Needs    Lack of Transportation (Medical): No    Lack of Transportation (Non-Medical): No   Physical Activity: Sufficiently Active    Days of Exercise per Week: 5 days    Minutes of Exercise per Session: 50 min   Stress: Stress Concern Present    Feeling of Stress : To some extent   Social Connections: Moderately Integrated    Frequency of Communication with Friends and Family: Three times a week    Frequency of Social Gatherings with Friends and Family: Twice a week    Attends [employer] Services: More than 4 times per year    Active Member of Clubs or Organizations: Yes    Attends Club or Organization Meetings: More than 4 times per year    Marital Status:    Housing Stability: Unknown    Unable to Pay for Housing in the Last Year: No    Unstable Housing in the Last Year: No       Current Outpatient Medications   Medication Sig Dispense Refill    acetaminophen (TYLENOL) 325 MG tablet Take 325 mg by mouth every 6 (six) hours as needed for Pain.      aspirin (ECOTRIN) 81 MG EC tablet Take 81 mg by mouth Daily.      chlorthalidone (HYGROTEN) 25 MG Tab 25 mg. M, W, F only      cloNIDine (CATAPRES) 0.1 MG tablet 1 tablet by mouth  as needed every 1 to 2 hours for SBP over 160, max 6 tabs/day      diclofenac sodium (VOLTAREN) 1 % Gel Apply 2 g topically once daily. (Patient not taking: Reported on 5/8/2023) 20 g 1    EScitalopram oxalate (LEXAPRO) 5 MG Tab Take 5 mg by mouth once daily.      famotidine (PEPCID) 20 MG tablet ONCE DAILY      guaiFENesin (MUCINEX) 600 mg 12 hr tablet Take 1,200 mg by mouth 2 (two) times daily.      levothyroxine (SYNTHROID) 75 MCG tablet TAKE 1 TABLET BY MOUTH EVERY DAY 90  tablet 0    losartan (COZAAR) 100 MG tablet Take 1 tablet (100 mg total) by mouth once daily. (Patient taking differently: Take 100 mg by mouth every evening.) 90 tablet 1    losartan (COZAAR) 50 MG tablet Take 50 mg by mouth once daily. am      metoprolol succinate (TOPROL-XL) 25 MG 24 hr tablet       metoprolol succinate (TOPROL-XL) 50 MG 24 hr tablet Take by mouth.       No current facility-administered medications for this visit.       Review of patient's allergies indicates:  No Known Allergies      Review of Systems   Constitutional: Negative for chills.   Cardiovascular:  Negative for claudication and leg swelling.   Skin:  Positive for color change and nail changes.   Musculoskeletal:  Negative for joint pain and joint swelling.   Gastrointestinal:  Negative for nausea and vomiting.   Neurological:  Positive for numbness and paresthesias.   Psychiatric/Behavioral:  Negative for altered mental status.          Objective:      Physical Exam  Constitutional:       General: She is not in acute distress.     Appearance: She is well-developed. She is not diaphoretic.   Cardiovascular:      Pulses:           Dorsalis pedis pulses are 1+ on the right side and 1+ on the left side.        Posterior tibial pulses are 1+ on the right side and 1+ on the left side.      Comments: CFT 3 seconds bilateral.  Pedal hair growth decreased bilateral.   Mild varicosities noted bilateral.  Mild nonpitting edema noted to bilateral lower extremity.  Toes are cool to touch bilateral.    Musculoskeletal:         General: No tenderness.      Comments: Muscle strength 5/5 in all muscle groups bilateral.  No tenderness nor crepitation with ROM of foot/ankle joints bilateral.  No pain with palpation to bilateral foot and ankle.  Bilateral hallux abducto valgus.  Bilateral semi-rigid contracture of toes 2-5.       Skin:     General: Skin is warm and dry.      Capillary Refill: Capillary refill takes less than 2 seconds.      Coloration:  Skin is not pale.      Findings: No abrasion, bruising, burn, ecchymosis, erythema, laceration, lesion, petechiae or rash.      Comments: Pedal skin appears thin bilateral.  Toenails x 10 appear thickened by 2 mm, elongated by 4 mm and discolored with subungual debris.    Neurological:      Mental Status: She is alert and oriented to person, place, and time.      Sensory: Sensory deficit present.      Motor: No weakness or atrophy.      Comments: Protective sensation per [place] monofilament is absent bilateral.  Light touch is absent bilateral.             Assessment:       Encounter Diagnoses   Name Primary?    Peripheral vascular disease Yes    Idiopathic peripheral neuropathy     Hammer toes of both feet     Onychomycosis due to dermatophyte          Plan:       Silke was seen today for peripheral vascular disease.    Diagnoses and all orders for this visit:    Peripheral vascular disease    Idiopathic peripheral neuropathy    Hammer toes of both feet    Onychomycosis due to dermatophyte      I counseled the patient on her conditions, their implications and medical management.    PVD and neuropathy remain stable with today's exam, will continue to monitor.    Advised to continue wearing shoe gear that accommodates for digital deformities.    With patient's permission, nails were aggressively reduced and debrided x 10 to their soft tissue attachment mechanically and with electric , removing all offending nail and debris. Patient relates relief following the procedure. She will continue to monitor the areas daily, inspect her feet, wear protective shoe gear when ambulatory, moisturizer to maintain skin integrity and follow in this office in approximately 3 months, sooner p.r.n.    Narayan Jonas DPM

## 2023-08-16 ENCOUNTER — PATIENT MESSAGE (OUTPATIENT)
Dept: FAMILY MEDICINE | Facility: CLINIC | Age: 88
End: 2023-08-16
Payer: MEDICARE

## 2023-08-16 DIAGNOSIS — I10 ESSENTIAL HYPERTENSION: ICD-10-CM

## 2023-08-16 DIAGNOSIS — I70.0 AORTIC ATHEROSCLEROSIS: ICD-10-CM

## 2023-08-16 DIAGNOSIS — E03.9 HYPOTHYROIDISM, UNSPECIFIED TYPE: ICD-10-CM

## 2023-08-16 DIAGNOSIS — E53.8 LOW SERUM VITAMIN B12: Primary | ICD-10-CM

## 2023-09-21 ENCOUNTER — OFFICE VISIT (OUTPATIENT)
Dept: PODIATRY | Facility: CLINIC | Age: 88
End: 2023-09-21
Payer: MEDICARE

## 2023-09-21 VITALS — HEIGHT: 64 IN | WEIGHT: 155 LBS | BODY MASS INDEX: 26.46 KG/M2

## 2023-09-21 DIAGNOSIS — M20.42 HAMMER TOES OF BOTH FEET: ICD-10-CM

## 2023-09-21 DIAGNOSIS — G60.9 IDIOPATHIC PERIPHERAL NEUROPATHY: ICD-10-CM

## 2023-09-21 DIAGNOSIS — M20.41 HAMMER TOES OF BOTH FEET: ICD-10-CM

## 2023-09-21 DIAGNOSIS — B35.1 ONYCHOMYCOSIS DUE TO DERMATOPHYTE: ICD-10-CM

## 2023-09-21 DIAGNOSIS — I73.9 PERIPHERAL VASCULAR DISEASE: Primary | ICD-10-CM

## 2023-09-21 PROCEDURE — 99999 PR PBB SHADOW E&M-EST. PATIENT-LVL II: ICD-10-PCS | Mod: PBBFAC,HCNC,, | Performed by: PODIATRIST

## 2023-09-21 PROCEDURE — 99499 UNLISTED E&M SERVICE: CPT | Mod: HCNC,S$GLB,, | Performed by: PODIATRIST

## 2023-09-21 PROCEDURE — 11721 DEBRIDE NAIL 6 OR MORE: CPT | Mod: Q9,HCNC,S$GLB, | Performed by: PODIATRIST

## 2023-09-21 PROCEDURE — 99999 PR PBB SHADOW E&M-EST. PATIENT-LVL II: CPT | Mod: PBBFAC,HCNC,, | Performed by: PODIATRIST

## 2023-09-21 PROCEDURE — 99499 NO LOS: ICD-10-PCS | Mod: HCNC,S$GLB,, | Performed by: PODIATRIST

## 2023-09-21 PROCEDURE — 11721 PR DEBRIDEMENT OF NAILS, 6 OR MORE: ICD-10-PCS | Mod: Q9,HCNC,S$GLB, | Performed by: PODIATRIST

## 2023-09-22 NOTE — PROGRESS NOTES
Subjective:      Patient ID: Silke Dodd is a 89 y.o. female.    Chief Complaint: Nail Care      Silke is a 89 y.o. female who presents to the clinic for evaluation and treatment of high risk feet. 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 The patient's chief complaint consists of toenails in need of trimming.  Denies being painful with wearing shoe gear.  Has not attempted to self treat.  Denies experiencing neuropathy pain with today's exam.  Continues with slight tingling in the lower extremities, that she finds to be more of a nuisance.  Has not experienced rest pain nor claudication.  Denies any additional pedal complaints.       PCP: Anibal Fisher, DO    Date Last Seen by PCP: 5/23    Past Medical History:   Diagnosis Date    Acute angle-closure glaucoma of right eye     Breast cancer 2015    Cancer     BREAST, UTURINE    Corns and callosities     Depression     Hypertension     Hypothyroid     Osteoporosis, post-menopausal 4/23/2013    Ovarian cancer     Panic disorder        Past Surgical History:   Procedure Laterality Date    CATARACT EXTRACTION Bilateral     corrected for monovision    HYSTERECTOMY  1975    Cancer cells    MASTECTOMY Right 2015    TOTAL ABDOMINAL HYSTERECTOMY W/ BILATERAL SALPINGOOPHORECTOMY  1975       Family History   Problem Relation Age of Onset    Cancer Mother     Breast cancer Mother     Diabetes Father     Stroke Father     Coronary artery disease Father        Social History     Socioeconomic History    Marital status:     Number of children: 3   Tobacco Use    Smoking status: Never    Smokeless tobacco: Never   Substance and Sexual Activity    Alcohol use: Yes     Alcohol/week: 5.0 standard drinks of alcohol     Types: 5 Glasses of wine per week    Drug use: No     Sexual activity: Never     Birth control/protection: Surgical     Social Determinants of Health     Financial Resource Strain: Low Risk  (2/22/2023)    Overall Financial Resource Strain (CARDIA)     Difficulty of Paying Living Expenses: Not very hard   Food Insecurity: No Food Insecurity (2/22/2023)    Hunger Vital Sign     Worried About Running Out of Food in the Last Year: Never true     Ran Out of Food in the Last Year: Never true   Transportation Needs: No Transportation Needs (2/22/2023)    PRAPARE - Transportation     Lack of Transportation (Medical): No     Lack of Transportation (Non-Medical): No   Physical Activity: Sufficiently Active (2/22/2023)    Exercise Vital Sign     Days of Exercise per Week: 5 days     Minutes of Exercise per Session: 50 min   Stress: Stress Concern Present (2/22/2023)    [place] [employer] [employer] - Occupational Stress Questionnaire     Feeling of Stress : To some extent   Social Connections: Moderately Integrated (2/22/2023)    Social Connection and Isolation Panel [NHANES]     Frequency of Communication with Friends and Family: Three times a week     Frequency of Social Gatherings with Friends and Family: Twice a week     Attends [employer] Services: More than 4 times per year     Active Member of Clubs or Organizations: Yes     Attends Club or Organization Meetings: More than 4 times per year     Marital Status:    Housing Stability: Unknown (2/22/2023)    Housing Stability Vital Sign     Unable to Pay for Housing in the Last Year: No     Unstable Housing in the Last Year: No       Current Outpatient Medications   Medication Sig Dispense Refill    acetaminophen (TYLENOL) 325 MG tablet Take 325 mg by mouth every 6 (six) hours as needed for Pain.      aspirin (ECOTRIN) 81 MG EC tablet Take 81 mg by mouth Daily.      chlorthalidone (HYGROTEN) 25 MG Tab 25 mg. M, W, F only      cloNIDine (CATAPRES) 0.1 MG tablet 1 tablet by mouth  as needed every 1 to 2 hours  for SBP over 160, max 6 tabs/day      diclofenac sodium (VOLTAREN) 1 % Gel Apply 2 g topically once daily. (Patient not taking: Reported on 5/8/2023) 20 g 1    EScitalopram oxalate (LEXAPRO) 5 MG Tab Take 5 mg by mouth once daily.      famotidine (PEPCID) 20 MG tablet ONCE DAILY      flu vac 2023 [id number], (FLUAD QUAD 2023-24,65Y UP,,PF,) 60 mcg (15 mcg x 4)/0.5 mL Syrg Inject 0.5 mLs into the muscle once. for 1 dose 0.5 mL 0    guaiFENesin (MUCINEX) 600 mg 12 hr tablet Take 1,200 mg by mouth 2 (two) times daily.      levothyroxine (SYNTHROID) 75 MCG tablet TAKE 1 TABLET BY MOUTH EVERY DAY 90 tablet 0    losartan (COZAAR) 100 MG tablet Take 1 tablet (100 mg total) by mouth once daily. (Patient taking differently: Take 100 mg by mouth every evening.) 90 tablet 1    losartan (COZAAR) 50 MG tablet Take 50 mg by mouth once daily. am      metoprolol succinate (TOPROL-XL) 25 MG 24 hr tablet       metoprolol succinate (TOPROL-XL) 50 MG 24 hr tablet Take by mouth.       No current facility-administered medications for this visit.       Review of patient's allergies indicates:  No Known Allergies      Review of Systems   Constitutional: Negative for chills.   Cardiovascular:  Negative for claudication and leg swelling.   Skin:  Positive for color change and nail changes.   Musculoskeletal:  Negative for joint pain and joint swelling.   Gastrointestinal:  Negative for nausea and vomiting.   Neurological:  Positive for numbness and paresthesias.   Psychiatric/Behavioral:  Negative for altered mental status.            Objective:      Physical Exam  Constitutional:       General: She is not in acute distress.     Appearance: She is well-developed. She is not diaphoretic.   Cardiovascular:      Pulses:           Dorsalis pedis pulses are 1+ on the right side and 1+ on the left side.        Posterior tibial pulses are 1+ on the right side and 1+ on the left side.      Comments: CFT 3 seconds bilateral.  Pedal hair growth  decreased bilateral.   Mild varicosities noted bilateral.  Mild nonpitting edema noted to bilateral lower extremity.  Toes are cool to touch bilateral.    Musculoskeletal:         General: No tenderness.      Comments: Muscle strength 5/5 in all muscle groups bilateral.  No tenderness nor crepitation with ROM of foot/ankle joints bilateral.  No pain with palpation to bilateral foot and ankle.  Bilateral hallux abducto valgus.  Bilateral semi-rigid contracture of toes 2-5.       Skin:     General: Skin is warm and dry.      Capillary Refill: Capillary refill takes less than 2 seconds.      Coloration: Skin is not pale.      Findings: No abrasion, bruising, burn, ecchymosis, erythema, laceration, lesion, petechiae or rash.      Comments: Pedal skin appears thin bilateral.  Toenails x 10 appear thickened by 2 mm, elongated by 4 mm and discolored with subungual debris.    Neurological:      Mental Status: She is alert and oriented to person, place, and time.      Sensory: Sensory deficit present.      Motor: No weakness or atrophy.      Comments: Protective sensation per [place] monofilament is absent bilateral.  Light touch is absent bilateral.               Assessment:       Encounter Diagnoses   Name Primary?    Peripheral vascular disease Yes    Idiopathic peripheral neuropathy     Hammer toes of both feet     Onychomycosis due to dermatophyte          Plan:       Silke was seen today for nail care.    Diagnoses and all orders for this visit:    Peripheral vascular disease    Idiopathic peripheral neuropathy    Hammer toes of both feet    Onychomycosis due to dermatophyte      I counseled the patient on her conditions, their implications and medical management.    PVD and neuropathy remain stable with today's exam, will continue to monitor.    Neuropathy is also stable and minimally symptomatic.  No further treatment is warranted.    Advised to continue wearing shoe gear that accommodates for digital  deformities.    With patient's permission, nails were aggressively reduced and debrided x 10 to their soft tissue attachment mechanically and with electric , removing all offending nail and debris. Patient relates relief following the procedure. She will continue to monitor the areas daily, inspect her feet, wear protective shoe gear when ambulatory, moisturizer to maintain skin integrity and follow in this office in approximately 3 months, sooner p.r.n.    Narayan Jonas DPM

## 2023-11-08 ENCOUNTER — OFFICE VISIT (OUTPATIENT)
Dept: FAMILY MEDICINE | Facility: CLINIC | Age: 88
End: 2023-11-08
Payer: MEDICARE

## 2023-11-08 VITALS
SYSTOLIC BLOOD PRESSURE: 128 MMHG | WEIGHT: 160.94 LBS | HEIGHT: 64 IN | DIASTOLIC BLOOD PRESSURE: 76 MMHG | OXYGEN SATURATION: 97 % | BODY MASS INDEX: 27.48 KG/M2 | HEART RATE: 59 BPM

## 2023-11-08 DIAGNOSIS — N18.31 STAGE 3A CHRONIC KIDNEY DISEASE: ICD-10-CM

## 2023-11-08 DIAGNOSIS — M81.0 OSTEOPOROSIS, POST-MENOPAUSAL: ICD-10-CM

## 2023-11-08 DIAGNOSIS — I10 ESSENTIAL HYPERTENSION: Primary | ICD-10-CM

## 2023-11-08 DIAGNOSIS — E03.9 HYPOTHYROIDISM, UNSPECIFIED TYPE: ICD-10-CM

## 2023-11-08 PROCEDURE — 1126F PR PAIN SEVERITY QUANTIFIED, NO PAIN PRESENT: ICD-10-PCS | Mod: HCNC,CPTII,S$GLB, | Performed by: INTERNAL MEDICINE

## 2023-11-08 PROCEDURE — 1159F PR MEDICATION LIST DOCUMENTED IN MEDICAL RECORD: ICD-10-PCS | Mod: HCNC,CPTII,S$GLB, | Performed by: INTERNAL MEDICINE

## 2023-11-08 PROCEDURE — 1101F PT FALLS ASSESS-DOCD LE1/YR: CPT | Mod: HCNC,CPTII,S$GLB, | Performed by: INTERNAL MEDICINE

## 2023-11-08 PROCEDURE — 99999 PR PBB SHADOW E&M-EST. PATIENT-LVL IV: CPT | Mod: PBBFAC,HCNC,, | Performed by: INTERNAL MEDICINE

## 2023-11-08 PROCEDURE — 99999 PR PBB SHADOW E&M-EST. PATIENT-LVL IV: ICD-10-PCS | Mod: PBBFAC,HCNC,, | Performed by: INTERNAL MEDICINE

## 2023-11-08 PROCEDURE — 3288F PR FALLS RISK ASSESSMENT DOCUMENTED: ICD-10-PCS | Mod: HCNC,CPTII,S$GLB, | Performed by: INTERNAL MEDICINE

## 2023-11-08 PROCEDURE — 1159F MED LIST DOCD IN RCRD: CPT | Mod: HCNC,CPTII,S$GLB, | Performed by: INTERNAL MEDICINE

## 2023-11-08 PROCEDURE — 1101F PR PT FALLS ASSESS DOC 0-1 FALLS W/OUT INJ PAST YR: ICD-10-PCS | Mod: HCNC,CPTII,S$GLB, | Performed by: INTERNAL MEDICINE

## 2023-11-08 PROCEDURE — 3288F FALL RISK ASSESSMENT DOCD: CPT | Mod: HCNC,CPTII,S$GLB, | Performed by: INTERNAL MEDICINE

## 2023-11-08 PROCEDURE — 1126F AMNT PAIN NOTED NONE PRSNT: CPT | Mod: HCNC,CPTII,S$GLB, | Performed by: INTERNAL MEDICINE

## 2023-11-08 PROCEDURE — 99214 PR OFFICE/OUTPT VISIT, EST, LEVL IV, 30-39 MIN: ICD-10-PCS | Mod: HCNC,S$GLB,, | Performed by: INTERNAL MEDICINE

## 2023-11-08 PROCEDURE — 1160F PR REVIEW ALL MEDS BY PRESCRIBER/CLIN PHARMACIST DOCUMENTED: ICD-10-PCS | Mod: HCNC,CPTII,S$GLB, | Performed by: INTERNAL MEDICINE

## 2023-11-08 PROCEDURE — 99214 OFFICE O/P EST MOD 30 MIN: CPT | Mod: HCNC,S$GLB,, | Performed by: INTERNAL MEDICINE

## 2023-11-08 PROCEDURE — 1160F RVW MEDS BY RX/DR IN RCRD: CPT | Mod: HCNC,CPTII,S$GLB, | Performed by: INTERNAL MEDICINE

## 2023-11-08 RX ORDER — ESCITALOPRAM OXALATE 5 MG/1
5 TABLET ORAL 2 TIMES DAILY
COMMUNITY
End: 2024-02-08

## 2023-11-08 NOTE — PROGRESS NOTES
Patient ID: Silke Dodd is a 89 y.o. female.    Chief Complaint: No chief complaint on file.        Assessment and Plan      1. Essential hypertension    2. Hypothyroidism, unspecified type    3. Stage 3a chronic kidney disease  - Basic Metabolic Panel; Future    4. Osteoporosis, post-menopausal  - Vitamin D; Future     Continue current medications  Continue follow-up with cardiology   Follow-up in 6 months     HPI     Here with Daughter, Janneth   At [facility].   Using walker. Active in exercise classes there.  Right upper lateral thigh pain- likely IT band, discussed stretching.   Cards- Dr. Champion  Labs reviewed.     Hypertension- metoprolol xl 25 mg am and 50 mg pm, losartan 50 mg am and 100 mg pm, chlorthalidone 25 mg M/W/F. Clonidine prn. Cards managing. Controlled    Anxiety- lexapro 5 mg BID.  Stable    Osteoporosis- does not want med. Taking 50K vit D.     Taking asa, B12.    Thyroid is controlled.     CKD- stable.     Having some hearing trouble but does not want to wear hearing aids.  Discussed benefits.  Bilateral ear canals with some nonobstructing serum    Review of Systems   Constitutional:  Negative for fever.   Respiratory:  Negative for shortness of breath.    Cardiovascular:  Negative for chest pain.   Gastrointestinal:  Negative for abdominal pain.        Objective     Vitals:    11/08/23 1001   BP: 128/76   Pulse: (!) 59     Wt Readings from Last 3 Encounters:   11/08/23 1001 73 kg (160 lb 15 oz)   09/21/23 1421 70.3 kg (155 lb)   06/15/23 1401 70.6 kg (155 lb 10.3 oz)      Body mass index is 27.62 kg/m².   Physical Exam  Cardiovascular:      Rate and Rhythm: Normal rate and regular rhythm.      Heart sounds: Murmur heard.      No gallop.   Pulmonary:      Breath sounds: Normal breath sounds. No wheezing or rhonchi.   Abdominal:      Palpations: Abdomen is soft.      Tenderness: There is no abdominal tenderness.          Medication List with Changes/Refills   Current Medications     ACETAMINOPHEN (TYLENOL) 325 MG TABLET    Take 325 mg by mouth every 6 (six) hours as needed for Pain.    ASPIRIN (ECOTRIN) 81 MG EC TABLET    Take 81 mg by mouth Daily.    CHLORTHALIDONE (HYGROTEN) 25 MG TAB    25 mg. M, W, F only    CLONIDINE (CATAPRES) 0.1 MG TABLET    1 tablet by mouth  as needed every 1 to 2 hours for SBP over 160, max 6 tabs/day    DICLOFENAC SODIUM (VOLTAREN) 1 % GEL    Apply 2 g topically once daily.    ESCITALOPRAM OXALATE (LEXAPRO) 5 MG TAB    Take 5 mg by mouth 2 (two) times a day.    FAMOTIDINE (PEPCID) 20 MG TABLET    ONCE DAILY    GUAIFENESIN (MUCINEX) 600 MG 12 HR TABLET    Take 1,200 mg by mouth 2 (two) times daily.    LEVOTHYROXINE (SYNTHROID) 75 MCG TABLET    TAKE 1 TABLET BY MOUTH EVERY DAY    LOSARTAN (COZAAR) 100 MG TABLET    Take 1 tablet (100 mg total) by mouth once daily.    LOSARTAN (COZAAR) 50 MG TABLET    Take 50 mg by mouth once daily. am    METOPROLOL SUCCINATE (TOPROL-XL) 25 MG 24 HR TABLET        METOPROLOL SUCCINATE (TOPROL-XL) 50 MG 24 HR TABLET    Take by mouth.   Discontinued Medications    ESCITALOPRAM OXALATE (LEXAPRO) 5 MG TAB    Take 5 mg by mouth once daily.       I personally reviewed past medical, family and social history.    Patient Active Problem List   Diagnosis    Dystrophic nail    Onychomycosis    Hypothyroid    Osteoporosis, post-menopausal    Hammer toes of both feet    History of breast cancer    Peripheral vascular disease    Onychomycosis due to dermatophyte    Choroidal neovascularization, left eye    Posterior vitreous detachment, both eyes    Aortic atherosclerosis    Carotid arterial disease    Essential hypertension    Gastroesophageal reflux disease    Nonulcer dyspepsia    Idiopathic peripheral neuropathy    Greater trochanteric bursitis of left hip    History of ovarian cancer    Stage 3a chronic kidney disease    Mixed incontinence urge and stress    History of subdural hematoma

## 2024-01-02 ENCOUNTER — PATIENT MESSAGE (OUTPATIENT)
Dept: PODIATRY | Facility: CLINIC | Age: 89
End: 2024-01-02
Payer: MEDICARE

## 2024-01-11 ENCOUNTER — OFFICE VISIT (OUTPATIENT)
Dept: PODIATRY | Facility: CLINIC | Age: 89
End: 2024-01-11
Payer: MEDICARE

## 2024-01-11 DIAGNOSIS — B35.1 ONYCHOMYCOSIS DUE TO DERMATOPHYTE: ICD-10-CM

## 2024-01-11 DIAGNOSIS — M20.42 HAMMER TOES OF BOTH FEET: ICD-10-CM

## 2024-01-11 DIAGNOSIS — I73.9 PERIPHERAL VASCULAR DISEASE: Primary | ICD-10-CM

## 2024-01-11 DIAGNOSIS — M20.41 HAMMER TOES OF BOTH FEET: ICD-10-CM

## 2024-01-11 DIAGNOSIS — G60.9 IDIOPATHIC PERIPHERAL NEUROPATHY: ICD-10-CM

## 2024-01-11 PROCEDURE — 99213 OFFICE O/P EST LOW 20 MIN: CPT | Mod: 25,HCNC,S$GLB, | Performed by: PODIATRIST

## 2024-01-11 PROCEDURE — 11721 DEBRIDE NAIL 6 OR MORE: CPT | Mod: Q9,HCNC,S$GLB, | Performed by: PODIATRIST

## 2024-01-11 NOTE — PROGRESS NOTES
Subjective:      Patient ID: Silke Dodd is a 89 y.o. female.    Chief Complaint: No chief complaint on file.      Silke is a 89 y.o. female who presents to the clinic for evaluation and treatment of high risk feet. 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 The patient's chief complaint consists of toenails in need of trimming.  Denies being painful with wearing shoe gear.  Has not attempted to self treat.  Denies experiencing neuropathy pain with today's exam.  Patient denies claudication with today's exam.  Denies any additional pedal complaints.       PCP: Anibal Fisher, DO    Date Last Seen by PCP: 11/23    Past Medical History:   Diagnosis Date    Acute angle-closure glaucoma of right eye     Breast cancer 2015    Cancer     BREAST, UTURINE    Corns and callosities     Depression     Hypertension     Hypothyroid     Osteoporosis, post-menopausal 4/23/2013    Ovarian cancer     Panic disorder        Past Surgical History:   Procedure Laterality Date    CATARACT EXTRACTION Bilateral     corrected for monovision    HYSTERECTOMY  1975    Cancer cells    MASTECTOMY Right 2015    TOTAL ABDOMINAL HYSTERECTOMY W/ BILATERAL SALPINGOOPHORECTOMY  1975       Family History   Problem Relation Age of Onset    Cancer Mother     Breast cancer Mother         80s    Diabetes Father     Stroke Father     Coronary artery disease Father        Social History     Socioeconomic History    Marital status:     Number of children: 3   Tobacco Use    Smoking status: Never    Smokeless tobacco: Never   Substance and Sexual Activity    Alcohol use: Yes     Alcohol/week: 5.0 standard drinks of alcohol     Types: 5 Glasses of wine per week    Drug use: No    Sexual activity: Never     Birth control/protection: Surgical      Social Determinants of Health     Financial Resource Strain: Low Risk  (2/22/2023)    Overall Financial Resource Strain (CARDIA)     Difficulty of Paying Living Expenses: Not very hard   Food Insecurity: No Food Insecurity (2/22/2023)    Hunger Vital Sign     Worried About Running Out of Food in the Last Year: Never true     Ran Out of Food in the Last Year: Never true   Transportation Needs: No Transportation Needs (2/22/2023)    PRAPARE - Transportation     Lack of Transportation (Medical): No     Lack of Transportation (Non-Medical): No   Physical Activity: Sufficiently Active (2/22/2023)    Exercise Vital Sign     Days of Exercise per Week: 5 days     Minutes of Exercise per Session: 50 min   Stress: Stress Concern Present (2/22/2023)    [place] [employer] - Occupational Stress Questionnaire     Feeling of Stress : To some extent   Social Connections: Moderately Integrated (2/22/2023)    Social Connection and Isolation Panel [NHANES]     Frequency of Communication with Friends and Family: Three times a week     Frequency of Social Gatherings with Friends and Family: Twice a week     Attends Church Services: More than 4 times per year     Active Member of Clubs or Organizations: Yes     Attends Club or Organization Meetings: More than 4 times per year     Marital Status:    Housing Stability: Unknown (2/22/2023)    Housing Stability Vital Sign     Unable to Pay for Housing in the Last Year: No     Unstable Housing in the Last Year: No       Current Outpatient Medications   Medication Sig Dispense Refill    acetaminophen (TYLENOL) 325 MG tablet Take 325 mg by mouth every 6 (six) hours as needed for Pain.      aspirin (ECOTRIN) 81 MG EC tablet Take 81 mg by mouth Daily.      chlorthalidone (HYGROTEN) 25 MG Tab 25 mg. M, W, F only      cloNIDine (CATAPRES) 0.1 MG tablet 1 tablet by mouth  as needed every 1 to 2 hours for SBP over 160, max 6 tabs/day      diclofenac sodium  (VOLTAREN) 1 % Gel Apply 2 g topically once daily. (Patient not taking: Reported on 5/8/2023) 20 g 1    EScitalopram oxalate (LEXAPRO) 5 MG Tab Take 5 mg by mouth 2 (two) times a day.      famotidine (PEPCID) 20 MG tablet ONCE DAILY      guaiFENesin (MUCINEX) 600 mg 12 hr tablet Take 1,200 mg by mouth 2 (two) times daily.      levothyroxine (SYNTHROID) 75 MCG tablet TAKE 1 TABLET BY MOUTH EVERY DAY 90 tablet 0    losartan (COZAAR) 100 MG tablet Take 1 tablet (100 mg total) by mouth once daily. (Patient taking differently: Take 100 mg by mouth every evening.) 90 tablet 1    losartan (COZAAR) 50 MG tablet Take 50 mg by mouth once daily. am      metoprolol succinate (TOPROL-XL) 25 MG 24 hr tablet       metoprolol succinate (TOPROL-XL) 50 MG 24 hr tablet Take by mouth.       No current facility-administered medications for this visit.       Review of patient's allergies indicates:  No Known Allergies      Review of Systems   Constitutional: Negative for chills.   Cardiovascular:  Negative for claudication and leg swelling.   Skin:  Positive for color change and nail changes.   Musculoskeletal:  Negative for joint pain and joint swelling.   Gastrointestinal:  Negative for nausea and vomiting.   Neurological:  Positive for numbness and paresthesias.   Psychiatric/Behavioral:  Negative for altered mental status.            Objective:      Physical Exam  Constitutional:       General: She is not in acute distress.     Appearance: She is well-developed. She is not diaphoretic.   Cardiovascular:      Pulses:           Dorsalis pedis pulses are 1+ on the right side and 1+ on the left side.        Posterior tibial pulses are 1+ on the right side and 1+ on the left side.      Comments: CFT 3 seconds bilateral.  Pedal hair growth decreased bilateral.   Mild varicosities noted bilateral.  Mild nonpitting edema noted to bilateral lower extremity.  Toes are cool to touch bilateral.    Musculoskeletal:         General: No tenderness.       Comments: Muscle strength 5/5 in all muscle groups bilateral.  No tenderness nor crepitation with ROM of foot/ankle joints bilateral.  No pain with palpation to bilateral foot and ankle.  Bilateral hallux abducto valgus.  Bilateral semi-rigid contracture of toes 2-5.       Skin:     General: Skin is warm and dry.      Capillary Refill: Capillary refill takes less than 2 seconds.      Coloration: Skin is not pale.      Findings: No abrasion, bruising, burn, ecchymosis, erythema, laceration, lesion, petechiae or rash.      Comments: Pedal skin appears thin bilateral.  Toenails x 10 appear thickened by 2 mm, elongated by 6 mm and discolored with subungual debris.  Mild incurvation noted to medial and lateral borders of bilateral hallux.  No localized sign of infection noted.     Neurological:      Mental Status: She is alert and oriented to person, place, and time.      Sensory: Sensory deficit present.      Motor: No weakness or atrophy.      Comments: Protective sensation per [place] monofilament is absent bilateral.  Light touch is absent bilateral.               Assessment:       Encounter Diagnoses   Name Primary?    Peripheral vascular disease Yes    Idiopathic peripheral neuropathy     Hammer toes of both feet     Onychomycosis due to dermatophyte          Plan:       Diagnoses and all orders for this visit:    Peripheral vascular disease    Idiopathic peripheral neuropathy    Hammer toes of both feet    Onychomycosis due to dermatophyte      I counseled the patient on her conditions, their implications and medical management.    PVD and neuropathy remain stable with today's exam, will continue to monitor..    Advised to continue wearing shoe gear that accommodates for digital deformities.    With patient's permission, nails were aggressively reduced and debrided x 10 to their soft tissue attachment mechanically and with electric , removing all offending nail and debris. Patient relates  relief following the procedure. She will continue to monitor the areas daily, inspect her feet, wear protective shoe gear when ambulatory, moisturizer to maintain skin integrity and follow in this office in approximately 3 months, sooner p.r.n.    Narayan Jonas DPM

## 2024-02-08 ENCOUNTER — OFFICE VISIT (OUTPATIENT)
Dept: FAMILY MEDICINE | Facility: CLINIC | Age: 89
End: 2024-02-08
Payer: MEDICARE

## 2024-02-08 VITALS
HEART RATE: 59 BPM | DIASTOLIC BLOOD PRESSURE: 78 MMHG | BODY MASS INDEX: 27.63 KG/M2 | OXYGEN SATURATION: 95 % | WEIGHT: 161.81 LBS | SYSTOLIC BLOOD PRESSURE: 124 MMHG | HEIGHT: 64 IN

## 2024-02-08 DIAGNOSIS — N18.31 STAGE 3A CHRONIC KIDNEY DISEASE: ICD-10-CM

## 2024-02-08 DIAGNOSIS — M79.604 RIGHT LEG PAIN: Primary | ICD-10-CM

## 2024-02-08 PROCEDURE — 99213 OFFICE O/P EST LOW 20 MIN: CPT | Mod: HCNC,S$GLB,, | Performed by: INTERNAL MEDICINE

## 2024-02-08 PROCEDURE — 1125F AMNT PAIN NOTED PAIN PRSNT: CPT | Mod: HCNC,CPTII,S$GLB, | Performed by: INTERNAL MEDICINE

## 2024-02-08 PROCEDURE — 1159F MED LIST DOCD IN RCRD: CPT | Mod: HCNC,CPTII,S$GLB, | Performed by: INTERNAL MEDICINE

## 2024-02-08 PROCEDURE — 99999 PR PBB SHADOW E&M-EST. PATIENT-LVL III: CPT | Mod: PBBFAC,HCNC,, | Performed by: INTERNAL MEDICINE

## 2024-02-08 PROCEDURE — 3288F FALL RISK ASSESSMENT DOCD: CPT | Mod: HCNC,CPTII,S$GLB, | Performed by: INTERNAL MEDICINE

## 2024-02-08 PROCEDURE — 1101F PT FALLS ASSESS-DOCD LE1/YR: CPT | Mod: HCNC,CPTII,S$GLB, | Performed by: INTERNAL MEDICINE

## 2024-02-08 PROCEDURE — 1160F RVW MEDS BY RX/DR IN RCRD: CPT | Mod: HCNC,CPTII,S$GLB, | Performed by: INTERNAL MEDICINE

## 2024-02-08 RX ORDER — DICLOFENAC SODIUM 10 MG/G
2 GEL TOPICAL DAILY
Qty: 20 G | Refills: 1 | Status: SHIPPED | OUTPATIENT
Start: 2024-02-08

## 2024-02-08 RX ORDER — BUPROPION HYDROCHLORIDE 75 MG/1
75 TABLET ORAL 2 TIMES DAILY
COMMUNITY

## 2024-02-08 NOTE — PROGRESS NOTES
Patient ID: Silke Dodd is a 89 y.o. female.    Chief Complaint: Leg Pain (Patient states she has been having a pain in her right leg for about a month now )      Assessment and plan     1. Stage 3a chronic kidney disease    2. Right leg pain  - diclofenac sodium (VOLTAREN) 1 % Gel; Apply 2 g topically once daily.  Dispense: 20 g; Refill: 1     Supportive therapy.  Voltaren gel.  Monitor  Follow-up as planned in May.     HPI     Having right lateral thigh pain x2 months.  No pain at the hip or the knee.  No numbness.  No swelling.  Not affecting her walking.  Exam unremarkable.    CKD stable    Lexapro was switched to Wellbutrin by her cardiologist secondary to weight gain.  She is tolerating the Wellbutrin 75 mg.        Review of Systems   Constitutional:  Negative for fever.   Respiratory:  Negative for shortness of breath.    Cardiovascular:  Negative for chest pain.   Gastrointestinal:  Negative for abdominal pain.        Objective     Vitals:    02/08/24 1338   BP: 124/78   Pulse: (!) 59     Wt Readings from Last 3 Encounters:   02/08/24 1338 73.4 kg (161 lb 13.1 oz)   02/01/24 1015 72.6 kg (160 lb)   01/10/24 1349 72.6 kg (160 lb)      Body mass index is 27.78 kg/m².     Physical Exam  Cardiovascular:      Rate and Rhythm: Normal rate and regular rhythm.      Heart sounds: No murmur heard.     No gallop.   Pulmonary:      Breath sounds: Normal breath sounds. No wheezing or rhonchi.   Abdominal:      Palpations: Abdomen is soft.      Tenderness: There is no abdominal tenderness.            Hypertension Medications               chlorthalidone (HYGROTEN) 25 MG Tab 25 mg. M, W, F only    cloNIDine (CATAPRES) 0.1 MG tablet 1 tablet by mouth  as needed every 1 to 2 hours for SBP over 160, max 6 tabs/day    losartan (COZAAR) 100 MG tablet Take 1 tablet (100 mg total) by mouth once daily.    losartan (COZAAR) 50 MG tablet Take 50 mg by mouth once daily. am    metoprolol succinate (TOPROL-XL) 25 MG 24 hr tablet      metoprolol succinate (TOPROL-XL) 50 MG 24 hr tablet Take by mouth.              Medication List with Changes/Refills   Current Medications    ACETAMINOPHEN (TYLENOL) 325 MG TABLET    Take 325 mg by mouth every 6 (six) hours as needed for Pain.    ASPIRIN (ECOTRIN) 81 MG EC TABLET    Take 81 mg by mouth Daily.    BUPROPION (WELLBUTRIN) 75 MG TABLET    Take 75 mg by mouth 2 (two) times daily.    CHLORTHALIDONE (HYGROTEN) 25 MG TAB    25 mg. M, W, F only    CLONIDINE (CATAPRES) 0.1 MG TABLET    1 tablet by mouth  as needed every 1 to 2 hours for SBP over 160, max 6 tabs/day    GUAIFENESIN (MUCINEX) 600 MG 12 HR TABLET    Take 1,200 mg by mouth 2 (two) times daily.    LEVOTHYROXINE (SYNTHROID) 75 MCG TABLET    TAKE 1 TABLET BY MOUTH EVERY DAY    LOSARTAN (COZAAR) 100 MG TABLET    Take 1 tablet (100 mg total) by mouth once daily.    LOSARTAN (COZAAR) 50 MG TABLET    Take 50 mg by mouth once daily. am    METOPROLOL SUCCINATE (TOPROL-XL) 25 MG 24 HR TABLET        METOPROLOL SUCCINATE (TOPROL-XL) 50 MG 24 HR TABLET    Take by mouth.   Changed and/or Refilled Medications    Modified Medication Previous Medication    DICLOFENAC SODIUM (VOLTAREN) 1 % GEL diclofenac sodium (VOLTAREN) 1 % Gel       Apply 2 g topically once daily.    Apply 2 g topically once daily.   Discontinued Medications    ESCITALOPRAM OXALATE (LEXAPRO) 5 MG TAB    Take 5 mg by mouth 2 (two) times a day.    FAMOTIDINE (PEPCID) 20 MG TABLET    ONCE DAILY       I personally reviewed past medical, family and social history.

## 2024-03-22 ENCOUNTER — OFFICE VISIT (OUTPATIENT)
Dept: HOME HEALTH SERVICES | Facility: CLINIC | Age: 89
End: 2024-03-22
Payer: MEDICARE

## 2024-03-22 VITALS
HEIGHT: 64 IN | HEART RATE: 64 BPM | SYSTOLIC BLOOD PRESSURE: 137 MMHG | OXYGEN SATURATION: 96 % | DIASTOLIC BLOOD PRESSURE: 63 MMHG | BODY MASS INDEX: 27.63 KG/M2 | WEIGHT: 161.81 LBS

## 2024-03-22 DIAGNOSIS — Z00.00 ENCOUNTER FOR PREVENTIVE HEALTH EXAMINATION: Primary | ICD-10-CM

## 2024-03-22 DIAGNOSIS — I10 ESSENTIAL HYPERTENSION: ICD-10-CM

## 2024-03-22 DIAGNOSIS — Z00.00 ENCOUNTER FOR MEDICARE ANNUAL WELLNESS EXAM: ICD-10-CM

## 2024-03-22 DIAGNOSIS — G60.9 IDIOPATHIC PERIPHERAL NEUROPATHY: ICD-10-CM

## 2024-03-22 DIAGNOSIS — Z85.3 HISTORY OF BREAST CANCER: ICD-10-CM

## 2024-03-22 DIAGNOSIS — M81.0 OSTEOPOROSIS, POST-MENOPAUSAL: ICD-10-CM

## 2024-03-22 DIAGNOSIS — E03.9 HYPOTHYROIDISM, UNSPECIFIED TYPE: ICD-10-CM

## 2024-03-22 DIAGNOSIS — I73.9 PERIPHERAL VASCULAR DISEASE: ICD-10-CM

## 2024-03-22 DIAGNOSIS — K21.9 GASTROESOPHAGEAL REFLUX DISEASE, UNSPECIFIED WHETHER ESOPHAGITIS PRESENT: ICD-10-CM

## 2024-03-22 DIAGNOSIS — Z99.89 DEPENDENCE ON OTHER ENABLING MACHINES AND DEVICES: ICD-10-CM

## 2024-03-22 DIAGNOSIS — I70.0 AORTIC ATHEROSCLEROSIS: ICD-10-CM

## 2024-03-22 PROCEDURE — 3288F FALL RISK ASSESSMENT DOCD: CPT | Mod: CPTII,S$GLB,, | Performed by: NURSE PRACTITIONER

## 2024-03-22 PROCEDURE — 1101F PT FALLS ASSESS-DOCD LE1/YR: CPT | Mod: CPTII,S$GLB,, | Performed by: NURSE PRACTITIONER

## 2024-03-22 PROCEDURE — G0439 PPPS, SUBSEQ VISIT: HCPCS | Mod: S$GLB,,, | Performed by: NURSE PRACTITIONER

## 2024-03-22 PROCEDURE — 1170F FXNL STATUS ASSESSED: CPT | Mod: CPTII,S$GLB,, | Performed by: NURSE PRACTITIONER

## 2024-03-22 NOTE — PROGRESS NOTES
"  Silke Dodd presented for a follow-up Medicare AWV today. The following components were reviewed and updated:    Medical history  Family History  Social history  Allergies and Current Medications  Health Risk Assessment  Health Maintenance  Care Team    **See Completed Assessments for Annual Wellness visit with in the encounter summary    The following assessments were completed:  Depression Screening  Cognitive function Screening  Timed Get Up Test  Whisper Test      Opioid documentation:      Patient does not have a current opioid prescription.          Vitals:    03/22/24 0815   BP: 137/63   Pulse: 64   SpO2: 96%   Weight: 73.4 kg (161 lb 13.1 oz)   Height: 5' 4" (1.626 m)     Body mass index is 27.78 kg/m².       Physical Exam  Constitutional:       Appearance: Normal appearance.   HENT:      Head: Normocephalic and atraumatic.      Nose: Nose normal.      Mouth/Throat:      Mouth: Mucous membranes are moist.   Eyes:      Extraocular Movements: Extraocular movements intact.      Pupils: Pupils are equal, round, and reactive to light.   Cardiovascular:      Rate and Rhythm: Normal rate and regular rhythm.   Pulmonary:      Effort: Pulmonary effort is normal.      Breath sounds: Normal breath sounds.   Abdominal:      General: Bowel sounds are normal.      Palpations: Abdomen is soft.   Musculoskeletal:         General: Normal range of motion.      Cervical back: Normal range of motion and neck supple.   Skin:     General: Skin is warm and dry.   Neurological:      General: No focal deficit present.      Mental Status: She is alert.   Psychiatric:         Mood and Affect: Mood normal.         Behavior: Behavior normal.           Diagnoses and health risks identified today and associated recommendations/orders:  1. Encounter for preventive health examination  Awv completed      2. Encounter for Medicare annual wellness exam  - Ambulatory Referral/Consult to Enhanced Annual Wellness Visit (eAWV)    3. Dependence on " other enabling machines and devices  Chronic and stable. Continue current treatment. Follow with PCP.  Using walker      4. Idiopathic peripheral neuropathy  Chronic and stable. Continue current treatment. Follow with PCP.      5. Essential hypertension  Chronic and stable. Continue current treatment. Follow with PCP.  On meds seeing Dr Champion    6. Peripheral vascular disease  Chronic and stable. Continue current treatment. Follow with PCP.    7. Aortic atherosclerosis  Chronic and stable. Continue current treatment. Follow with PCP.      8. History of breast cancer  Chronic and stable. Continue current treatment. Follow with PCP.      9. Hypothyroidism, unspecified type  Chronic and stable. Continue current treatment. Follow with PCP.  On meds      10. Osteoporosis, post-menopausal  Chronic and stable. Continue current treatment. Follow with PCP.      11. Gastroesophageal reflux disease, unspecified whether esophagitis present  Chronic and stable. Continue current treatment. Follow with PCP.        Provided Silke with a 5-10 year written screening schedule and personal prevention plan. Recommendations were developed using the USPSTF age appropriate recommendations. Education, counseling, and referrals were provided as needed.  After Visit Summary printed and given to patient which includes a list of additional screenings\tests needed.    Fu in 1 yr for nicole Langley NP

## 2024-03-22 NOTE — PATIENT INSTRUCTIONS
Counseling and Referral of Other Preventative  (Italic type indicates deductible and co-insurance are waived)    Patient Name: Silke Dodd  Today's Date: 3/22/2024    Health Maintenance       Date Due Completion Date    Lipid Panel 10/30/2028 10/30/2023    TETANUS VACCINE 04/07/2032 4/7/2022        No orders of the defined types were placed in this encounter.    The following information is provided to all patients.  This information is to help you find resources for any of the problems found today that may be affecting your health:                  Living healthy guide: www.[facility].[place].[facility]      Understanding Diabetes: www.diabetes.org      Eating healthy: www.cdc.gov/healthyweight      CDC home safety checklist: www.cdc.gov/steadi/patient.html      Agency on Aging: www.goea.[place].[facility]      Alcoholics anonymous (AA): www.aa.org      Physical Activity: www.meghna.nih.gov/ak2vrnt      Tobacco use: www.quitwithusla.org

## 2024-04-17 ENCOUNTER — OFFICE VISIT (OUTPATIENT)
Dept: PODIATRY | Facility: CLINIC | Age: 89
End: 2024-04-17
Payer: MEDICARE

## 2024-04-17 DIAGNOSIS — B35.1 ONYCHOMYCOSIS DUE TO DERMATOPHYTE: ICD-10-CM

## 2024-04-17 DIAGNOSIS — M20.41 HAMMER TOES OF BOTH FEET: ICD-10-CM

## 2024-04-17 DIAGNOSIS — I73.9 PERIPHERAL VASCULAR DISEASE: Primary | ICD-10-CM

## 2024-04-17 DIAGNOSIS — M20.42 HAMMER TOES OF BOTH FEET: ICD-10-CM

## 2024-04-17 DIAGNOSIS — G60.9 IDIOPATHIC PERIPHERAL NEUROPATHY: ICD-10-CM

## 2024-04-17 PROCEDURE — 99499 UNLISTED E&M SERVICE: CPT | Mod: HCNC,S$GLB,, | Performed by: PODIATRIST

## 2024-04-17 PROCEDURE — 11721 DEBRIDE NAIL 6 OR MORE: CPT | Mod: Q9,HCNC,S$GLB, | Performed by: PODIATRIST

## 2024-04-17 NOTE — PROGRESS NOTES
Subjective:      Patient ID: Silke Dodd is a 89 y.o. female.    Chief Complaint: No chief complaint on file.      Silke is a 89 y.o. female who presents to the clinic for evaluation and treatment of high risk feet. 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 The patient's chief complaint consists of toenails in need of trimming.  Denies being painful with wearing shoe gear.  Has not attempted to self treat.  Denies experiencing neuropathy pain with today's exam.  Denies claudication with today's exam.  Denies any additional pedal complaints.       PCP: Anibal Fisher, DO    Date Last Seen by PCP: 2/24    Past Medical History:   Diagnosis Date    Acute angle-closure glaucoma of right eye     Breast cancer 2015    Cancer     BREAST, UTURINE    Corns and callosities     Depression     Hypertension     Hypothyroid     Osteoporosis, post-menopausal 4/23/2013    Ovarian cancer     Panic disorder        Past Surgical History:   Procedure Laterality Date    CATARACT EXTRACTION Bilateral     corrected for monovision    HYSTERECTOMY  1975    Cancer cells    MASTECTOMY Right 2015    TOTAL ABDOMINAL HYSTERECTOMY W/ BILATERAL SALPINGOOPHORECTOMY  1975       Family History   Problem Relation Name Age of Onset    Cancer Mother      Breast cancer Mother          80s    Diabetes Father      Stroke Father      Coronary artery disease Father         Social History     Socioeconomic History    Marital status:     Number of children: 3   Tobacco Use    Smoking status: Never    Smokeless tobacco: Never   Substance and Sexual Activity    Alcohol use: Yes     Alcohol/week: 5.0 standard drinks of alcohol     Types: 5 Glasses of wine per week    Drug use: No    Sexual activity: Never     Birth control/protection: Surgical      Social Determinants of Health     Financial Resource Strain: Low Risk  (2/22/2023)    Overall Financial Resource Strain (CARDIA)     Difficulty of Paying Living Expenses: Not very hard   Food Insecurity: No Food Insecurity (2/22/2023)    Hunger Vital Sign     Worried About Running Out of Food in the Last Year: Never true     Ran Out of Food in the Last Year: Never true   Transportation Needs: No Transportation Needs (2/22/2023)    PRAPARE - Transportation     Lack of Transportation (Medical): No     Lack of Transportation (Non-Medical): No   Physical Activity: Sufficiently Active (2/22/2023)    Exercise Vital Sign     Days of Exercise per Week: 5 days     Minutes of Exercise per Session: 50 min   Stress: Stress Concern Present (2/22/2023)    [place] [employer] - Occupational Stress Questionnaire     Feeling of Stress : To some extent   Social Connections: Moderately Integrated (2/22/2023)    Social Connection and Isolation Panel [NHANES]     Frequency of Communication with Friends and Family: Three times a week     Frequency of Social Gatherings with Friends and Family: Twice a week     Attends [employer] Services: More than 4 times per year     Active Member of Clubs or Organizations: Yes     Attends Club or Organization Meetings: More than 4 times per year     Marital Status:    Housing Stability: Unknown (2/22/2023)    Housing Stability Vital Sign     Unable to Pay for Housing in the Last Year: No     Unstable Housing in the Last Year: No       Current Outpatient Medications   Medication Sig Dispense Refill    acetaminophen (TYLENOL) 325 MG tablet Take 325 mg by mouth every 6 (six) hours as needed for Pain.      aspirin (ECOTRIN) 81 MG EC tablet Take 81 mg by mouth Daily.      buPROPion (WELLBUTRIN) 75 MG tablet Take 75 mg by mouth 2 (two) times daily. Taking 1 tablet QD      chlorthalidone (HYGROTEN) 25 MG Tab 25 mg. M, W, F only      cloNIDine (CATAPRES) 0.1 MG tablet 1 tablet by  mouth  as needed every 1 to 2 hours for SBP over 160, max 6 tabs/day      diclofenac sodium (VOLTAREN) 1 % Gel Apply 2 g topically once daily. 20 g 1    guaiFENesin (MUCINEX) 600 mg 12 hr tablet Take 1,200 mg by mouth 2 (two) times daily.      levothyroxine (SYNTHROID) 75 MCG tablet TAKE 1 TABLET BY MOUTH EVERY DAY 90 tablet 0    losartan (COZAAR) 100 MG tablet Take 1 tablet (100 mg total) by mouth once daily. (Patient taking differently: Take 100 mg by mouth every evening.) 90 tablet 1    losartan (COZAAR) 50 MG tablet Take 50 mg by mouth once daily. am      metoprolol succinate (TOPROL-XL) 25 MG 24 hr tablet 25 mg every morning.      metoprolol succinate (TOPROL-XL) 50 MG 24 hr tablet Take 50 mg by mouth every evening.       No current facility-administered medications for this visit.       Review of patient's allergies indicates:  No Known Allergies      Review of Systems   Constitutional: Negative for chills.   Cardiovascular:  Negative for claudication and leg swelling.   Skin:  Positive for color change and nail changes.   Musculoskeletal:  Negative for joint pain and joint swelling.   Gastrointestinal:  Negative for nausea and vomiting.   Neurological:  Positive for numbness and paresthesias.   Psychiatric/Behavioral:  Negative for altered mental status.            Objective:      Physical Exam  Constitutional:       General: She is not in acute distress.     Appearance: She is well-developed. She is not diaphoretic.   Cardiovascular:      Pulses:           Dorsalis pedis pulses are 1+ on the right side and 1+ on the left side.        Posterior tibial pulses are 1+ on the right side and 1+ on the left side.      Comments: CFT 3 seconds bilateral.  Pedal hair growth decreased bilateral.   Mild varicosities noted bilateral.  Mild nonpitting edema noted to the RLE and moderate non pitting edema noted to the Lt. Forefoot.  Toes are cool to touch bilateral.    Musculoskeletal:         General: No tenderness.       Comments: Muscle strength 5/5 in all muscle groups bilateral.  No tenderness nor crepitation with ROM of foot/ankle joints bilateral.  No pain with palpation to bilateral foot and ankle.  Bilateral hallux abducto valgus.  Bilateral semi-rigid contracture of toes 2-5.       Skin:     General: Skin is warm and dry.      Capillary Refill: Capillary refill takes less than 2 seconds.      Coloration: Skin is not pale.      Findings: No abrasion, bruising, burn, ecchymosis, erythema, laceration, lesion, petechiae or rash.      Comments: Pedal skin appears thin bilateral.  Toenails x 10 appear thickened by 2 mm, elongated by 4 mm and discolored with subungual debris.  Mild incurvation noted to medial and lateral borders of bilateral hallux.  No localized sign of infection noted.     Neurological:      Mental Status: She is alert and oriented to person, place, and time.      Sensory: Sensory deficit present.      Motor: No weakness or atrophy.      Comments: Protective sensation per [place] monofilament is absent bilateral.  Light touch is absent bilateral.               Assessment:       Encounter Diagnoses   Name Primary?    Peripheral vascular disease Yes    Idiopathic peripheral neuropathy     Onychomycosis due to dermatophyte     Hammer toes of both feet            Plan:       Diagnoses and all orders for this visit:    Peripheral vascular disease    Idiopathic peripheral neuropathy    Onychomycosis due to dermatophyte    Hammer toes of both feet        I counseled the patient on her conditions, their implications and medical management.    PVD and neuropathy remain stable with today's exam, will continue to monitor..    Advised to continue wearing shoe gear that accommodates for digital deformities.    With patient's permission, nails were aggressively reduced and debrided x 10 to their soft tissue attachment mechanically and with electric , removing all offending nail and debris. Patient relates  relief following the procedure. She will continue to monitor the areas daily, inspect her feet, wear protective shoe gear when ambulatory, moisturizer to maintain skin integrity and follow in this office in approximately 3 months, sooner p.r.n.    Narayan Jonas DPM

## 2024-05-27 ENCOUNTER — OFFICE VISIT (OUTPATIENT)
Dept: FAMILY MEDICINE | Facility: CLINIC | Age: 89
End: 2024-05-27
Payer: MEDICARE

## 2024-05-27 VITALS
HEIGHT: 64 IN | BODY MASS INDEX: 27.21 KG/M2 | HEART RATE: 65 BPM | OXYGEN SATURATION: 97 % | WEIGHT: 159.38 LBS | DIASTOLIC BLOOD PRESSURE: 68 MMHG | SYSTOLIC BLOOD PRESSURE: 126 MMHG

## 2024-05-27 DIAGNOSIS — M70.61 TROCHANTERIC BURSITIS OF RIGHT HIP: ICD-10-CM

## 2024-05-27 DIAGNOSIS — I73.9 PERIPHERAL VASCULAR DISEASE: ICD-10-CM

## 2024-05-27 DIAGNOSIS — M81.0 OSTEOPOROSIS, POST-MENOPAUSAL: ICD-10-CM

## 2024-05-27 DIAGNOSIS — E03.9 HYPOTHYROIDISM, UNSPECIFIED TYPE: ICD-10-CM

## 2024-05-27 DIAGNOSIS — I10 ESSENTIAL HYPERTENSION: ICD-10-CM

## 2024-05-27 DIAGNOSIS — N18.31 STAGE 3A CHRONIC KIDNEY DISEASE: Primary | ICD-10-CM

## 2024-05-27 DIAGNOSIS — I70.0 AORTIC ATHEROSCLEROSIS: ICD-10-CM

## 2024-05-27 PROCEDURE — 1101F PT FALLS ASSESS-DOCD LE1/YR: CPT | Mod: HCNC,CPTII,S$GLB, | Performed by: INTERNAL MEDICINE

## 2024-05-27 PROCEDURE — 99999 PR PBB SHADOW E&M-EST. PATIENT-LVL IV: CPT | Mod: PBBFAC,HCNC,, | Performed by: INTERNAL MEDICINE

## 2024-05-27 PROCEDURE — 1126F AMNT PAIN NOTED NONE PRSNT: CPT | Mod: HCNC,CPTII,S$GLB, | Performed by: INTERNAL MEDICINE

## 2024-05-27 PROCEDURE — 99214 OFFICE O/P EST MOD 30 MIN: CPT | Mod: HCNC,S$GLB,, | Performed by: INTERNAL MEDICINE

## 2024-05-27 PROCEDURE — 1160F RVW MEDS BY RX/DR IN RCRD: CPT | Mod: HCNC,CPTII,S$GLB, | Performed by: INTERNAL MEDICINE

## 2024-05-27 PROCEDURE — 1159F MED LIST DOCD IN RCRD: CPT | Mod: HCNC,CPTII,S$GLB, | Performed by: INTERNAL MEDICINE

## 2024-05-27 PROCEDURE — 3288F FALL RISK ASSESSMENT DOCD: CPT | Mod: HCNC,CPTII,S$GLB, | Performed by: INTERNAL MEDICINE

## 2024-05-27 RX ORDER — ERGOCALCIFEROL 1.25 MG/1
50000 CAPSULE ORAL
COMMUNITY

## 2024-05-27 NOTE — PROGRESS NOTES
Patient ID: Silke Dodd is a 89 y.o. female.    Chief Complaint: Follow-up    At [facility].     Problem  HPI / pertinent exam  Plan   Hypertension Controlled. Am blood pressure is good. She does check it too much her daughter states. Takes losartan 50 mg in am and 100 mg pm and chlorthalidone 3x week. Taking metoprolol xl 25 mg in am and 50 mg in pm. Regimen designed by Dr. Champion  Continue current regimen    CKD  Stable. Avoiding NSAIDs. Discussed hydration.  monitor   Hypothyroid  Controlled  Continue levothyroxine   Anxiety  Stable.  Taken off of Lexapro by her cardiologist secondary to weight gain.  This was replaced with Wellbutrin.  Anxiety about the same. Monitor   Right hip bursitis  Continues. Puts voltaren on it which helps. Pain is located at greater trochanter.  Tenderness to palpation of right lateral hip Continue current management. PRN ortho referral placed.    Peripheral vascular disease Stable Monitor   Aortic atherosclerosis Stable.  Not on statin. Follow-up cardiology        Medications, recent labs, health maintenance, and diet has been reviewed.    Exercise- yes   Alcohol- 1 drink/ day  Tobacco- no            Diagnoses addressed and related orders     1. Stage 3a chronic kidney disease  - Comprehensive Metabolic Panel; Future    2. Essential hypertension  - Comprehensive Metabolic Panel; Future  - CBC Auto Differential; Future    3. Hypothyroidism, unspecified type  - TSH; Future    4. Aortic atherosclerosis  - Lipid Panel; Future    5. Trochanteric bursitis of right hip  - Ambulatory referral/consult to Orthopedics; Future          Review of Systems   Constitutional:  Negative for fever.   Respiratory:  Negative for shortness of breath.    Cardiovascular:  Negative for chest pain.   Gastrointestinal:  Negative for abdominal pain.     Vitals:    05/27/24 1447   BP: 126/68   Pulse: 65      Wt Readings from Last 3 Encounters:   05/27/24 1447 72.3 kg (159 lb 6.3 oz)   03/22/24 0815  73.4 kg (161 lb 13.1 oz)   02/08/24 1338 73.4 kg (161 lb 13.1 oz)      Body mass index is 27.36 kg/m².     Physical Exam  Cardiovascular:      Rate and Rhythm: Normal rate and regular rhythm.      Heart sounds: No murmur heard.     No gallop.   Pulmonary:      Breath sounds: Normal breath sounds. No wheezing or rhonchi.   Abdominal:      Palpations: Abdomen is soft.      Tenderness: There is no abdominal tenderness.            Hypertension Medications               chlorthalidone (HYGROTEN) 25 MG Tab 25 mg. M, W, F only    cloNIDine (CATAPRES) 0.1 MG tablet 1 tablet by mouth  as needed every 1 to 2 hours for SBP over 160, max 6 tabs/day    losartan (COZAAR) 100 MG tablet Take 1 tablet (100 mg total) by mouth once daily.    losartan (COZAAR) 50 MG tablet Take 50 mg by mouth once daily. am    metoprolol succinate (TOPROL-XL) 25 MG 24 hr tablet 25 mg every morning.    metoprolol succinate (TOPROL-XL) 50 MG 24 hr tablet Take 50 mg by mouth every evening.              Medication List with Changes/Refills   Current Medications    ACETAMINOPHEN (TYLENOL) 325 MG TABLET    Take 325 mg by mouth every 6 (six) hours as needed for Pain.    ASPIRIN (ECOTRIN) 81 MG EC TABLET    Take 81 mg by mouth Daily.    BUPROPION (WELLBUTRIN) 75 MG TABLET    Take 75 mg by mouth 2 (two) times daily. Taking 1 tablet QD    CHLORTHALIDONE (HYGROTEN) 25 MG TAB    25 mg. M, W, F only    CLONIDINE (CATAPRES) 0.1 MG TABLET    1 tablet by mouth  as needed every 1 to 2 hours for SBP over 160, max 6 tabs/day    DICLOFENAC SODIUM (VOLTAREN) 1 % GEL    Apply 2 g topically once daily.    ERGOCALCIFEROL (ERGOCALCIFEROL) 50,000 UNIT CAP    Take 50,000 Units by mouth every 7 days.    GUAIFENESIN (MUCINEX) 600 MG 12 HR TABLET    Take 1,200 mg by mouth 2 (two) times daily.    LEVOTHYROXINE (SYNTHROID) 75 MCG TABLET    TAKE 1 TABLET BY MOUTH EVERY DAY    LOSARTAN (COZAAR) 100 MG TABLET    Take 1 tablet (100 mg total) by mouth once daily.    LOSARTAN (COZAAR) 50  MG TABLET    Take 50 mg by mouth once daily. am    METOPROLOL SUCCINATE (TOPROL-XL) 25 MG 24 HR TABLET    25 mg every morning.    METOPROLOL SUCCINATE (TOPROL-XL) 50 MG 24 HR TABLET    Take 50 mg by mouth every evening.       I personally reviewed past medical, family and social history.

## 2024-07-23 ENCOUNTER — OFFICE VISIT (OUTPATIENT)
Dept: PODIATRY | Facility: CLINIC | Age: 89
End: 2024-07-23
Payer: MEDICARE

## 2024-07-23 VITALS — WEIGHT: 159.38 LBS | HEIGHT: 64 IN | BODY MASS INDEX: 27.21 KG/M2

## 2024-07-23 DIAGNOSIS — M20.42 HAMMER TOES OF BOTH FEET: ICD-10-CM

## 2024-07-23 DIAGNOSIS — B35.1 ONYCHOMYCOSIS DUE TO DERMATOPHYTE: ICD-10-CM

## 2024-07-23 DIAGNOSIS — M20.41 HAMMER TOES OF BOTH FEET: ICD-10-CM

## 2024-07-23 DIAGNOSIS — G60.9 IDIOPATHIC PERIPHERAL NEUROPATHY: ICD-10-CM

## 2024-07-23 DIAGNOSIS — I73.9 PERIPHERAL VASCULAR DISEASE: Primary | ICD-10-CM

## 2024-07-23 PROCEDURE — 99499 UNLISTED E&M SERVICE: CPT | Mod: HCNC,S$GLB,, | Performed by: PODIATRIST

## 2024-07-23 PROCEDURE — 99999 PR PBB SHADOW E&M-EST. PATIENT-LVL III: CPT | Mod: PBBFAC,HCNC,, | Performed by: PODIATRIST

## 2024-07-23 PROCEDURE — 11721 DEBRIDE NAIL 6 OR MORE: CPT | Mod: Q9,HCNC,S$GLB, | Performed by: PODIATRIST

## 2024-07-24 NOTE — PROGRESS NOTES
Subjective:      Patient ID: Silke Dodd is a 89 y.o. female.    Chief Complaint: Nail Care of the Right Foot and Swelling and Nail Care of the Left Foot      Silke is a 89 y.o. female who presents to the clinic for evaluation and treatment of high risk feet. 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 The patient's chief complaint consists of toenails in need of trimming.  Denies being painful with wearing shoe gear.  Has not attempted to self treat.  Denies experiencing neuropathy pain with today's exam.  Relates having more swelling in the Lt. Midfoot, however, denies this being associated with pain symptoms or discomfort.  Denies any additional pedal complaints.       PCP: Anibal Fisher,     Date Last Seen by PCP: 5/24    Past Medical History:   Diagnosis Date    Acute angle-closure glaucoma of right eye     Breast cancer 2015    Cancer     BREAST, UTURINE    Corns and callosities     Depression     Hypertension     Hypothyroid     Osteoporosis, post-menopausal 4/23/2013    Ovarian cancer     Panic disorder        Past Surgical History:   Procedure Laterality Date    CATARACT EXTRACTION Bilateral     corrected for monovision    HYSTERECTOMY  1975    Cancer cells    MASTECTOMY Right 2015    TOTAL ABDOMINAL HYSTERECTOMY W/ BILATERAL SALPINGOOPHORECTOMY  1975       Family History   Problem Relation Name Age of Onset    Cancer Mother      Breast cancer Mother          80s    Diabetes Father      Stroke Father      Coronary artery disease Father         Social History     Socioeconomic History    Marital status:     Number of children: 3   Tobacco Use    Smoking status: Never    Smokeless tobacco: Never   Substance and Sexual Activity    Alcohol use: Yes     Alcohol/week: 5.0 standard drinks of alcohol      Types: 5 Glasses of wine per week    Drug use: No    Sexual activity: Never     Birth control/protection: Surgical     Social Determinants of Health     Financial Resource Strain: Low Risk  (2/22/2023)    Overall Financial Resource Strain (CARDIA)     Difficulty of Paying Living Expenses: Not very hard   Food Insecurity: No Food Insecurity (5/26/2024)    Hunger Vital Sign     Worried About Running Out of Food in the Last Year: Never true     Ran Out of Food in the Last Year: Never true   Transportation Needs: No Transportation Needs (2/22/2023)    PRAPARE - Transportation     Lack of Transportation (Medical): No     Lack of Transportation (Non-Medical): No   Physical Activity: Unknown (5/26/2024)    Exercise Vital Sign     Days of Exercise per Week: Patient declined   Stress: Stress Concern Present (5/26/2024)    [place] [employer] - Occupational Stress Questionnaire     Feeling of Stress : To some extent   Housing Stability: Unknown (2/22/2023)    Housing Stability Vital Sign     Unable to Pay for Housing in the Last Year: No     Unstable Housing in the Last Year: No       Current Outpatient Medications   Medication Sig Dispense Refill    acetaminophen (TYLENOL) 325 MG tablet Take 325 mg by mouth every 6 (six) hours as needed for Pain.      aspirin (ECOTRIN) 81 MG EC tablet Take 81 mg by mouth Daily.      buPROPion (WELLBUTRIN) 75 MG tablet Take 75 mg by mouth 2 (two) times daily. Taking 1 tablet QD      chlorthalidone (HYGROTEN) 25 MG Tab 25 mg. M, W, F only      cloNIDine (CATAPRES) 0.1 MG tablet 1 tablet by mouth  as needed every 1 to 2 hours for SBP over 160, max 6 tabs/day      diclofenac sodium (VOLTAREN) 1 % Gel Apply 2 g topically once daily. 20 g 1    ergocalciferol (ERGOCALCIFEROL) 50,000 unit Cap Take 50,000 Units by mouth every 7 days.      guaiFENesin (MUCINEX) 600 mg 12 hr tablet Take 1,200 mg by mouth 2 (two) times daily.      levothyroxine (SYNTHROID) 75 MCG tablet TAKE 1  TABLET BY MOUTH EVERY DAY 90 tablet 0    losartan (COZAAR) 100 MG tablet Take 1 tablet (100 mg total) by mouth once daily. (Patient taking differently: Take 100 mg by mouth every evening.) 90 tablet 1    losartan (COZAAR) 50 MG tablet Take 50 mg by mouth once daily. am      metoprolol succinate (TOPROL-XL) 25 MG 24 hr tablet 25 mg every morning.      metoprolol succinate (TOPROL-XL) 50 MG 24 hr tablet Take 50 mg by mouth every evening.       No current facility-administered medications for this visit.       Review of patient's allergies indicates:  No Known Allergies      Review of Systems   Constitutional: Negative for chills.   Cardiovascular:  Negative for claudication and leg swelling.   Skin:  Positive for color change and nail changes.   Musculoskeletal:  Negative for joint pain and joint swelling.   Gastrointestinal:  Negative for nausea and vomiting.   Neurological:  Positive for numbness and paresthesias.   Psychiatric/Behavioral:  Negative for altered mental status.            Objective:      Physical Exam  Constitutional:       General: She is not in acute distress.     Appearance: She is well-developed. She is not diaphoretic.   Cardiovascular:      Pulses:           Dorsalis pedis pulses are 1+ on the right side and 1+ on the left side.        Posterior tibial pulses are 1+ on the right side and 1+ on the left side.      Comments: CFT 3 seconds bilateral.  Pedal hair growth decreased bilateral.   Mild varicosities noted bilateral.  Mild nonpitting edema noted to the RLE and moderate non pitting edema noted to the Lt. Forefoot.  Toes are cool to touch bilateral.    Musculoskeletal:         General: No tenderness.      Comments: Muscle strength 5/5 in all muscle groups bilateral.  No tenderness nor crepitation with ROM of foot/ankle joints bilateral.  No pain with palpation to bilateral foot and ankle.  Bilateral hallux abducto valgus.  Bilateral semi-rigid contracture of toes 2-5.       Skin:      General: Skin is warm and dry.      Capillary Refill: Capillary refill takes less than 2 seconds.      Coloration: Skin is not pale.      Findings: No abrasion, bruising, burn, ecchymosis, erythema, laceration, lesion, petechiae or rash.      Comments: Pedal skin appears thin bilateral.  Toenails x 10 appear thickened by 2 mm, elongated by 4 mm and discolored with subungual debris.  Mild incurvation noted to medial and lateral borders of bilateral hallux.  No localized sign of infection noted.     Neurological:      Mental Status: She is alert and oriented to person, place, and time.      Sensory: Sensory deficit present.      Motor: No weakness or atrophy.      Comments: Protective sensation per [place] monofilament is absent bilateral.  Light touch is absent bilateral.               Assessment:       Encounter Diagnoses   Name Primary?    Peripheral vascular disease Yes    Idiopathic peripheral neuropathy     Hammer toes of both feet     Onychomycosis due to dermatophyte              Plan:       Silke was seen today for nail care, swelling and nail care.    Diagnoses and all orders for this visit:    Peripheral vascular disease    Idiopathic peripheral neuropathy    Hammer toes of both feet    Onychomycosis due to dermatophyte          I counseled the patient on her conditions, their implications and medical management.    PVD and neuropathy remain stable with today's exam, will continue to monitor..    Advised to continue wearing shoe gear that accommodates for digital deformities.    With patient's permission, nails were aggressively reduced and debrided x 10 to their soft tissue attachment mechanically and with electric , removing all offending nail and debris. Patient relates relief following the procedure. She will continue to monitor the areas daily, inspect her feet, wear protective shoe gear when ambulatory, moisturizer to maintain skin integrity and follow in this office in approximately 3  months, sooner stephanie.    Narayan Jonas DPM

## 2024-08-23 ENCOUNTER — TELEPHONE (OUTPATIENT)
Dept: FAMILY MEDICINE | Facility: CLINIC | Age: 89
End: 2024-08-23
Payer: MEDICARE

## 2024-08-23 DIAGNOSIS — G89.29 CHRONIC HIP PAIN, RIGHT: Primary | ICD-10-CM

## 2024-08-23 DIAGNOSIS — M25.551 CHRONIC HIP PAIN, RIGHT: Primary | ICD-10-CM

## 2024-08-23 NOTE — TELEPHONE ENCOUNTER
----- Message from Francisca Raza sent at 8/23/2024 12:02 PM CDT -----  Contact: daughter/adriana  Type: Needs Medical Advice  Who Called:  [email or web address]     Best Call Back Number: [phone]  Additional Information: DaughterKelechi called to see if xray orders could be reinstated for moms rt leg hip xray

## 2024-08-24 NOTE — TELEPHONE ENCOUNTER
Please schedule   Orders Placed This Encounter   Procedures    X-Ray Hip 2 or 3 views Right with Pelvis when performed     Standing Status:   Future     Standing Expiration Date:   8/23/2025     Order Specific Question:   May the Radiologist modify the order per protocol to meet the clinical needs of the patient?     Answer:   Yes     Order Specific Question:   Release to patient     Answer:   Immediate

## 2024-08-26 ENCOUNTER — PATIENT MESSAGE (OUTPATIENT)
Dept: FAMILY MEDICINE | Facility: CLINIC | Age: 89
End: 2024-08-26
Payer: MEDICARE

## 2024-08-26 ENCOUNTER — HOSPITAL ENCOUNTER (OUTPATIENT)
Dept: RADIOLOGY | Facility: HOSPITAL | Age: 89
Discharge: HOME | End: 2024-08-26
Attending: INTERNAL MEDICINE
Payer: MEDICARE

## 2024-08-26 DIAGNOSIS — M25.551 CHRONIC HIP PAIN, RIGHT: ICD-10-CM

## 2024-08-26 DIAGNOSIS — G89.29 CHRONIC HIP PAIN, RIGHT: ICD-10-CM

## 2024-08-26 PROCEDURE — 73502 X-RAY EXAM HIP UNI 2-3 VIEWS: CPT | Mod: 26,HCNC,RT, | Performed by: RADIOLOGY

## 2024-08-26 PROCEDURE — 73502 X-RAY EXAM HIP UNI 2-3 VIEWS: CPT | Mod: TC,HCNC,FY,PO,RT

## 2024-09-05 ENCOUNTER — OFFICE VISIT (OUTPATIENT)
Dept: ORTHOPEDICS | Facility: CLINIC | Age: 89
End: 2024-09-05
Payer: MEDICARE

## 2024-09-05 VITALS — BODY MASS INDEX: 27.21 KG/M2 | HEIGHT: 64 IN | WEIGHT: 159.38 LBS

## 2024-09-05 DIAGNOSIS — M70.61 TROCHANTERIC BURSITIS OF RIGHT HIP: Primary | ICD-10-CM

## 2024-09-05 PROCEDURE — 99213 OFFICE O/P EST LOW 20 MIN: CPT | Mod: HCNC,S$GLB,, | Performed by: NURSE PRACTITIONER

## 2024-09-05 PROCEDURE — 1159F MED LIST DOCD IN RCRD: CPT | Mod: HCNC,CPTII,S$GLB, | Performed by: NURSE PRACTITIONER

## 2024-09-05 PROCEDURE — 1101F PT FALLS ASSESS-DOCD LE1/YR: CPT | Mod: HCNC,CPTII,S$GLB, | Performed by: NURSE PRACTITIONER

## 2024-09-05 PROCEDURE — 99999 PR PBB SHADOW E&M-EST. PATIENT-LVL IV: CPT | Mod: PBBFAC,HCNC,, | Performed by: NURSE PRACTITIONER

## 2024-09-05 PROCEDURE — 1160F RVW MEDS BY RX/DR IN RCRD: CPT | Mod: HCNC,CPTII,S$GLB, | Performed by: NURSE PRACTITIONER

## 2024-09-05 PROCEDURE — 1126F AMNT PAIN NOTED NONE PRSNT: CPT | Mod: HCNC,CPTII,S$GLB, | Performed by: NURSE PRACTITIONER

## 2024-09-05 PROCEDURE — 3288F FALL RISK ASSESSMENT DOCD: CPT | Mod: HCNC,CPTII,S$GLB, | Performed by: NURSE PRACTITIONER

## 2024-09-05 RX ORDER — CLONIDINE 0.1 MG/24H
1 PATCH, EXTENDED RELEASE TRANSDERMAL
COMMUNITY
Start: 2024-08-09

## 2024-09-13 ENCOUNTER — PATIENT MESSAGE (OUTPATIENT)
Dept: FAMILY MEDICINE | Facility: CLINIC | Age: 89
End: 2024-09-13
Payer: MEDICARE

## 2024-09-13 ENCOUNTER — TELEPHONE (OUTPATIENT)
Dept: FAMILY MEDICINE | Facility: CLINIC | Age: 89
End: 2024-09-13
Payer: MEDICARE

## 2024-09-13 ENCOUNTER — E-VISIT (OUTPATIENT)
Dept: FAMILY MEDICINE | Facility: CLINIC | Age: 89
End: 2024-09-13
Payer: MEDICARE

## 2024-09-13 DIAGNOSIS — N30.00 ACUTE CYSTITIS WITHOUT HEMATURIA: Primary | ICD-10-CM

## 2024-09-13 RX ORDER — SULFAMETHOXAZOLE AND TRIMETHOPRIM 800; 160 MG/1; MG/1
1 TABLET ORAL 2 TIMES DAILY
Qty: 6 TABLET | Refills: 0 | Status: SHIPPED | OUTPATIENT
Start: 2024-09-13 | End: 2024-09-16

## 2024-09-13 NOTE — PROGRESS NOTES
Patient ID: Silke Dodd is a 89 y.o. female.    Chief Complaint: Urinary Tract Infection (Entered automatically based on patient selection in [facility].)          274}  The patient initiated a request through [facility] on 9/13/2024 for evaluation and management with a chief complaint of Urinary Tract Infection (Entered automatically based on patient selection in [facility].)     I evaluated the questionnaire submission on 09/13/2024 .    Total Time (in minutes): 12     [facility] Peq Evisit Uti Questionnaire    9/13/2024 10:18 AM CDT - Filed by Patient   Do you agree to participate in an E-Visit? Yes   If you have any of the following symptoms, please present to your local emergency room or call 911:  I acknowledge   Choose the state of your primary residence [place]   What is the main issue you would like addressed today? UTI   What symptoms do you currently have? Difficulty passing urine   When did your symptoms first appear? 9/10/2024   List what you have done or taken to help your symptoms. Nothing   Please indicate whether you have had the following symptoms during the past 24 hours     Urgent urination (a sudden and uncontrollable urge to urinate) Mild   Frequent urination of small amounts of urine (going to the toilet very often) None   Burning pain when urinating None   Incomplete bladder emptying (still feel like you need to urinate again after urination) Mild   Pain not associated with urination in the lower abdomen below the belly button) None   What does your urine look like? Clear   Blood seen in the urine None   Flank pain (pain in one or both sides of the lower back) None   Abnormal Vaginal Discharge (abnormal amount, color and/or odor) None   Discharge from the urethra (urinary opening) without urination None   High body temperature/fever? None-<99.5   Please rate how much discomfort you have experience because of the symptoms in the past 24 hours: None   Please indicate how the symptoms have interfered with your  every day activities/work in the past 24 hours: Moderate   Please indicate how these symptoms have interfered with your social activities (visiting people, meeting with friends, etc.) in the past 24 hours? Moderate   Are you a diabetic? No   Please indicate whether you have the following at the time of completion of this questionnaire: None of the above   Provide any additional information you feel is important.    Please attach any relevant images or files (if you have performed a home test for UTI, please submit a photo of results)    Are you able to take your vital signs? No          Active Problem List with Overview Notes    Diagnosis Date Noted    Mixed incontinence urge and stress 05/03/2021    History of subdural hematoma 05/03/2021    Stage 3a chronic kidney disease 03/16/2021    History of ovarian cancer 07/06/2020    Greater trochanteric bursitis of left hip 06/04/2020    Idiopathic peripheral neuropathy 02/26/2019    Gastroesophageal reflux disease 08/02/2018    Nonulcer dyspepsia 08/02/2018    Aortic atherosclerosis 12/07/2017     Abd US 10/24/14        Carotid arterial disease 12/07/2017     Carotid US 3/28/14      Essential hypertension 12/07/2017    Choroidal neovascularization, 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    Posterior vitreous detachment, both eyes 06/05/2017    Peripheral vascular disease 03/30/2017    Onychomycosis due to dermatophyte 03/30/2017    History of breast cancer 02/10/2016    Hammer toes of both feet 03/11/2014    Osteoporosis, post-menopausal 04/23/2013    Hypothyroid     Dystrophic nail 05/01/2012    Onychomycosis 05/01/2012      Recent Labs Obtained:  Lab Results   Component Value Date    WBC 6.66 10/30/2023    HGB 11.9 (L) 10/30/2023    HCT 35.9 (L) 10/30/2023    MCV 93 10/30/2023     10/30/2023     05/17/2024    K 3.9 05/17/2024    GLU 97 05/17/2024    CREATININE 1.04 05/17/2024    EGFRNORACEVR 49 (A) 05/17/2024    HGBA1C 5.5 02/14/2023    TSH 1.880 10/30/2023      Review of  patient's allergies indicates:  No Known Allergies    Encounter Diagnosis   Name Primary?    Acute cystitis without hematuria Yes        Orders Placed This Encounter   Procedures    Urinalysis, Reflex to Urine Culture Urine, Clean Catch     Standing Status:   Future     Standing Expiration Date:   11/12/2025     Order Specific Question:   Preferred Collection Type     Answer:   Urine, Clean Catch     Order Specific Question:   Specimen Source     Answer:   Urine      Medications Ordered This Encounter   Medications    sulfamethoxazole-trimethoprim 800-160mg (BACTRIM DS) 800-160 mg Tab     Sig: Take 1 tablet by mouth 2 (two) times daily. for 3 days     Dispense:  6 tablet     Refill:  0        E-Visit Time Tracking:    Day 1 Time (in minutes): 12    Total Time (in minutes): 12      274}

## 2024-09-13 NOTE — TELEPHONE ENCOUNTER
----- Message from Yohan Isbell sent at 9/13/2024 10:26 AM CDT -----  Regarding: advice  Contact: Janneth glez  Type: Needs Medical Advice  Who Called:  Janneth glez   Symptoms (please be specific):    How long has patient had these symptoms:    Pharmacy name and phone #:    Best Call Back Number: [phone]  Additional Information: She is calling because the pt has been acting a little confused and she things her mom have a UTI. She stated that she can  a urin sample. Please call to advise. Thanks

## 2024-09-16 NOTE — PROGRESS NOTES
Chief Complaint   Patient presents with    Right Hip - Pain      HPI:   This is a 89 y.o. who presents to clinic today for right hip pain for the past over 2-3 weeks after no known trauma. Complains of pain while sleeping on side and when descending stairs. Pain is dull and deep. No numbness or tingling. No associated signs or symptoms.      Past Medical History:   Diagnosis Date    Acute angle-closure glaucoma of right eye     Breast cancer 2015    Cancer     BREAST, UTURINE    Corns and callosities     Depression     Hypertension     Hypothyroid     Osteoporosis, post-menopausal 4/23/2013    Ovarian cancer     Panic disorder      Past Surgical History:   Procedure Laterality Date    CATARACT EXTRACTION Bilateral     corrected for monovision    HYSTERECTOMY  1975    Cancer cells    MASTECTOMY Right 2015    TOTAL ABDOMINAL HYSTERECTOMY W/ BILATERAL SALPINGOOPHORECTOMY  1975     Current Outpatient Medications on File Prior to Visit   Medication Sig Dispense Refill    cloNIDine 0.1 mg/24 hr td ptwk (CATAPRES) 0.1 mg/24 hr 1 patch every 7 days.      acetaminophen (TYLENOL) 325 MG tablet Take 325 mg by mouth every 6 (six) hours as needed for Pain.      aspirin (ECOTRIN) 81 MG EC tablet Take 81 mg by mouth Daily.      buPROPion (WELLBUTRIN) 75 MG tablet Take 75 mg by mouth 2 (two) times daily. Taking 1 tablet QD      chlorthalidone (HYGROTEN) 25 MG Tab 25 mg. M, W, F only      cloNIDine (CATAPRES) 0.1 MG tablet 1 tablet by mouth  as needed every 1 to 2 hours for SBP over 160, max 6 tabs/day      diclofenac sodium (VOLTAREN) 1 % Gel Apply 2 g topically once daily. 20 g 1    ergocalciferol (ERGOCALCIFEROL) 50,000 unit Cap Take 50,000 Units by mouth every 7 days.      guaiFENesin (MUCINEX) 600 mg 12 hr tablet Take 1,200 mg by mouth 2 (two) times daily.      levothyroxine (SYNTHROID) 75 MCG tablet TAKE 1 TABLET BY MOUTH EVERY DAY 90 tablet 0    losartan (COZAAR) 100 MG tablet Take 1 tablet (100 mg total) by mouth once  daily. (Patient taking differently: Take 100 mg by mouth every evening.) 90 tablet 1    losartan (COZAAR) 50 MG tablet Take 50 mg by mouth once daily. am      metoprolol succinate (TOPROL-XL) 25 MG 24 hr tablet 25 mg every morning.      metoprolol succinate (TOPROL-XL) 50 MG 24 hr tablet Take 50 mg by mouth every evening.       No current facility-administered medications on file prior to visit.     Review of patient's allergies indicates:  No Known Allergies  Family History   Problem Relation Name Age of Onset    Cancer Mother      Breast cancer Mother          80s    Diabetes Father      Stroke Father      Coronary artery disease Father       Social History     Socioeconomic History    Marital status:     Number of children: 3   Tobacco Use    Smoking status: Never    Smokeless tobacco: Never   Substance and Sexual Activity    Alcohol use: Yes     Alcohol/week: 5.0 standard drinks of alcohol     Types: 5 Glasses of wine per week    Drug use: No    Sexual activity: Never     Birth control/protection: Surgical     Social Determinants of Health     Financial Resource Strain: Low Risk  (2/22/2023)    Overall Financial Resource Strain (CARDIA)     Difficulty of Paying Living Expenses: Not very hard   Food Insecurity: No Food Insecurity (5/26/2024)    Hunger Vital Sign     Worried About Running Out of Food in the Last Year: Never true     Ran Out of Food in the Last Year: Never true   Transportation Needs: No Transportation Needs (2/22/2023)    PRAPARE - Transportation     Lack of Transportation (Medical): No     Lack of Transportation (Non-Medical): No   Physical Activity: Unknown (5/26/2024)    Exercise Vital Sign     Days of Exercise per Week: Patient declined   Stress: Stress Concern Present (5/26/2024)    [employer] - Occupational Stress Questionnaire     Feeling of Stress : To some extent   Housing Stability: Unknown (2/22/2023)    Housing Stability Vital Sign     Unable to Pay  for Housing in the Last Year: No     Unstable Housing in the Last Year: No       Review of Systems:  Constitutional:  Denies fever or chills   Eyes:  Denies change in visual acuity   HENT:  Denies nasal congestion or sore throat   Respiratory:  Denies cough or shortness of breath   Cardiovascular:  Denies chest pain or edema   GI:  Denies abdominal pain, nausea, vomiting, bloody stools or diarrhea   :  Denies dysuria   Integument:  Denies rash   Neurologic:  Denies headache, focal weakness or sensory changes   Endocrine:  Denies polyuria or polydipsia   Lymphatic:  Denies swollen glands   Psychiatric:  Denies depression or anxiety     Physical Exam:   Constitutional:  Well developed, well nourished, no acute distress, non-toxic appearance   Integument:  Well hydrated, no rash   Lymphatic:  No lymphadenopathy noted   Neurologic:  Alert & oriented x 3  Psychiatric:  Speech and behavior appropriate     Bilateral Hip Exam    Left Hip Exam   Left hip exam performed same as contralateral hip and is normal.     Right Hip Exam   Tenderness   The patient is experiencing tenderness in the greater trochanter.  Range of Motion   The patient has normal hip ROM.  Muscle Strength   Abduction: 4/5   Other   Erythema: absent  Sensation: normal  Pulse: present    X-rays were personally reviewed by me and findings discussed with the patient.  2 views of the right hip show no fractures or dislocations      Assessment & plan    Trochanteric bursitis of right hip       We discussed medications, exercises, and physical therapy. We also discussed injecting bursa if needed.   For now, she will do home exercises provided      Rtc as needed.

## 2024-10-23 ENCOUNTER — OFFICE VISIT (OUTPATIENT)
Dept: PODIATRY | Facility: CLINIC | Age: 89
End: 2024-10-23
Payer: MEDICARE

## 2024-10-23 VITALS — BODY MASS INDEX: 27.21 KG/M2 | WEIGHT: 159.38 LBS | HEIGHT: 64 IN

## 2024-10-23 DIAGNOSIS — B35.1 ONYCHOMYCOSIS DUE TO DERMATOPHYTE: ICD-10-CM

## 2024-10-23 DIAGNOSIS — M20.41 HAMMER TOES OF BOTH FEET: ICD-10-CM

## 2024-10-23 DIAGNOSIS — I73.9 PERIPHERAL VASCULAR DISEASE: Primary | ICD-10-CM

## 2024-10-23 DIAGNOSIS — M20.42 HAMMER TOES OF BOTH FEET: ICD-10-CM

## 2024-10-23 DIAGNOSIS — G60.9 IDIOPATHIC PERIPHERAL NEUROPATHY: ICD-10-CM

## 2024-10-23 PROCEDURE — 99999 PR PBB SHADOW E&M-EST. PATIENT-LVL II: CPT | Mod: PBBFAC,HCNC,, | Performed by: PODIATRIST

## 2024-10-23 NOTE — PROGRESS NOTES
Subjective:      Patient ID: Silke Dodd is a 89 y.o. female.    Chief Complaint: No chief complaint on file.      Silke is a 89 y.o. female who presents to the clinic for evaluation and treatment of high risk feet. 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 The patient's chief complaint consists of toenails in need of trimming.  Denies being painful with wearing shoe gear.  Has not attempted to self treat.  Denies experiencing neuropathy pain with today's exam.  Notes the usual swelling of the Lt. Dorsal forefoot, but she denies this being a major source of pain.  Denies any additional pedal complaints.       PCP: Anibal Fisher,     Date Last Seen by PCP: 5/24    Past Medical History:   Diagnosis Date    Acute angle-closure glaucoma of right eye     Breast cancer 2015    Cancer     BREAST, UTURINE    Corns and callosities     Depression     Hypertension     Hypothyroid     Osteoporosis, post-menopausal 4/23/2013    Ovarian cancer     Panic disorder        Past Surgical History:   Procedure Laterality Date    CATARACT EXTRACTION Bilateral     corrected for monovision    HYSTERECTOMY  1975    Cancer cells    MASTECTOMY Right 2015    TOTAL ABDOMINAL HYSTERECTOMY W/ BILATERAL SALPINGOOPHORECTOMY  1975       Family History   Problem Relation Name Age of Onset    Cancer Mother      Breast cancer Mother          80s    Diabetes Father      Stroke Father      Coronary artery disease Father         Social History     Socioeconomic History    Marital status:     Number of children: 3   Tobacco Use    Smoking status: Never    Smokeless tobacco: Never   Substance and Sexual Activity    Alcohol use: Yes     Alcohol/week: 5.0 standard drinks of alcohol     Types: 5 Glasses of wine per week    Drug use: No    Sexual  activity: Never     Birth control/protection: Surgical     Social Drivers of Health     Financial Resource Strain: Low Risk  (2/22/2023)    Overall Financial Resource Strain (CARDIA)     Difficulty of Paying Living Expenses: Not very hard   Food Insecurity: No Food Insecurity (5/26/2024)    Hunger Vital Sign     Worried About Running Out of Food in the Last Year: Never true     Ran Out of Food in the Last Year: Never true   Transportation Needs: No Transportation Needs (2/22/2023)    PRAPARE - Transportation     Lack of Transportation (Medical): No     Lack of Transportation (Non-Medical): No   Physical Activity: Unknown (5/26/2024)    Exercise Vital Sign     Days of Exercise per Week: Patient declined   Stress: Stress Concern Present (5/26/2024)    [employer] [place] [employer] - Occupational Stress Questionnaire     Feeling of Stress : To some extent   Housing Stability: Unknown (2/22/2023)    Housing Stability Vital Sign     Unable to Pay for Housing in the Last Year: No     Unstable Housing in the Last Year: No       Current Outpatient Medications   Medication Sig Dispense Refill    acetaminophen (TYLENOL) 325 MG tablet Take 325 mg by mouth every 6 (six) hours as needed for Pain.      aspirin (ECOTRIN) 81 MG EC tablet Take 81 mg by mouth Daily.      buPROPion (WELLBUTRIN) 75 MG tablet Take 75 mg by mouth 2 (two) times daily. Taking 1 tablet QD      chlorthalidone (HYGROTEN) 25 MG Tab 25 mg. M, W, F only      cloNIDine (CATAPRES) 0.1 MG tablet 1 tablet by mouth  as needed every 1 to 2 hours for SBP over 160, max 6 tabs/day      cloNIDine 0.1 mg/24 hr td ptwk (CATAPRES) 0.1 mg/24 hr 1 patch every 7 days.      diclofenac sodium (VOLTAREN) 1 % Gel Apply 2 g topically once daily. 20 g 1    ergocalciferol (ERGOCALCIFEROL) 50,000 unit Cap Take 50,000 Units by mouth every 7 days.      guaiFENesin (MUCINEX) 600 mg 12 hr tablet Take 1,200 mg by mouth 2 (two) times daily.      levothyroxine (SYNTHROID) 75 MCG  tablet TAKE 1 TABLET BY MOUTH EVERY DAY 90 tablet 0    losartan (COZAAR) 100 MG tablet Take 1 tablet (100 mg total) by mouth once daily. (Patient taking differently: Take 100 mg by mouth every evening.) 90 tablet 1    losartan (COZAAR) 50 MG tablet Take 50 mg by mouth once daily. am      metoprolol succinate (TOPROL-XL) 25 MG 24 hr tablet 25 mg every morning.      metoprolol succinate (TOPROL-XL) 50 MG 24 hr tablet Take 50 mg by mouth every evening.       No current facility-administered medications for this visit.       Review of patient's allergies indicates:  No Known Allergies      Review of Systems   Constitutional: Negative for chills.   Cardiovascular:  Negative for claudication and leg swelling.   Skin:  Positive for color change and nail changes.   Musculoskeletal:  Negative for joint pain and joint swelling.   Gastrointestinal:  Negative for nausea and vomiting.   Neurological:  Positive for numbness and paresthesias.   Psychiatric/Behavioral:  Negative for altered mental status.            Objective:      Physical Exam  Constitutional:       General: She is not in acute distress.     Appearance: She is well-developed. She is not diaphoretic.   Cardiovascular:      Pulses:           Dorsalis pedis pulses are 1+ on the right side and 1+ on the left side.        Posterior tibial pulses are 1+ on the right side and 1+ on the left side.      Comments: CFT 3 seconds bilateral.  Pedal hair growth decreased bilateral.   Mild varicosities noted bilateral.  Mild nonpitting edema noted to the RLE and moderate non pitting edema noted to the Lt. Forefoot.  Toes are cool to touch bilateral.    Musculoskeletal:         General: No tenderness.      Comments: Muscle strength 5/5 in all muscle groups bilateral.  No tenderness nor crepitation with ROM of foot/ankle joints bilateral.  No pain with palpation to bilateral foot and ankle.  Bilateral hallux abducto valgus.  Bilateral semi-rigid contracture of toes 2-5.        Skin:     General: Skin is warm and dry.      Capillary Refill: Capillary refill takes less than 2 seconds.      Coloration: Skin is not pale.      Findings: No abrasion, bruising, burn, ecchymosis, erythema, laceration, lesion, petechiae or rash.      Comments: Pedal skin appears thin bilateral.  Toenails x 10 appear thickened by 2 mm, elongated by 4 mm and discolored with subungual debris.  Mild incurvation noted to medial and lateral borders of bilateral hallux.  No localized sign of infection noted.     Neurological:      Mental Status: She is alert and oriented to person, place, and time.      Sensory: Sensory deficit present.      Motor: No weakness or atrophy.      Comments: Protective sensation per Pollock-Jose monofilament is absent bilateral.  Light touch is absent bilateral.             Assessment:       Encounter Diagnoses   Name Primary?    Peripheral vascular disease Yes    Idiopathic peripheral neuropathy     Hammer toes of both feet     Onychomycosis due to dermatophyte              Plan:       Diagnoses and all orders for this visit:    Peripheral vascular disease    Idiopathic peripheral neuropathy    Hammer toes of both feet    Onychomycosis due to dermatophyte          I counseled the patient on her conditions, their implications and medical management.    PVD and neuropathy remain stable with today's exam, will continue to monitor..    Advised to continue wearing shoe gear that accommodates for digital deformities.    With patient's permission, nails were aggressively reduced and debrided x 10 to their soft tissue attachment mechanically and with electric , removing all offending nail and debris. Patient relates relief following the procedure. She will continue to monitor the areas daily, inspect her feet, wear protective shoe gear when ambulatory, moisturizer to maintain skin integrity and follow in this office in approximately 3 months, sooner p.r.n.    Narayan Jonas DPM

## 2024-12-30 ENCOUNTER — PATIENT MESSAGE (OUTPATIENT)
Dept: PODIATRY | Facility: CLINIC | Age: 89
End: 2024-12-30
Payer: MEDICARE

## 2024-12-31 ENCOUNTER — TELEPHONE (OUTPATIENT)
Dept: FAMILY MEDICINE | Facility: CLINIC | Age: 89
End: 2024-12-31
Payer: MEDICARE

## 2024-12-31 NOTE — TELEPHONE ENCOUNTER
----- Message from [employer] sent at 12/31/2024  8:13 AM CST -----  Type:  Test Results    Who Called:  Janneth daughter  Name of Test (Lab/Mammo/Etc):  UA  Date of Test:  12/30  Ordering Provider:  same  Where the test was performed:  [facility]  Best Call Back Number:  [phone]   Additional Information:  call daughter abnormal results    [id number] - [place] - [address]  [address] [place] [address]  Phone: [phone] Fax: [phone]    Please advise  Thanks

## 2025-01-02 NOTE — TELEPHONE ENCOUNTER
Spoke with the patient's daughter informed her of Dr. Fisher's message she verified understanding.

## 2025-01-07 ENCOUNTER — OFFICE VISIT (OUTPATIENT)
Dept: FAMILY MEDICINE | Facility: CLINIC | Age: OVER 89
End: 2025-01-07
Payer: MEDICARE

## 2025-01-07 VITALS
HEIGHT: 64 IN | OXYGEN SATURATION: 95 % | BODY MASS INDEX: 28.19 KG/M2 | HEART RATE: 75 BPM | SYSTOLIC BLOOD PRESSURE: 124 MMHG | DIASTOLIC BLOOD PRESSURE: 62 MMHG | WEIGHT: 165.13 LBS

## 2025-01-07 DIAGNOSIS — N18.31 STAGE 3A CHRONIC KIDNEY DISEASE: ICD-10-CM

## 2025-01-07 DIAGNOSIS — E03.9 HYPOTHYROIDISM, UNSPECIFIED TYPE: ICD-10-CM

## 2025-01-07 DIAGNOSIS — I10 ESSENTIAL HYPERTENSION: Primary | ICD-10-CM

## 2025-01-07 PROCEDURE — G2211 COMPLEX E/M VISIT ADD ON: HCPCS | Mod: HCNC,S$GLB,, | Performed by: INTERNAL MEDICINE

## 2025-01-07 PROCEDURE — 1160F RVW MEDS BY RX/DR IN RCRD: CPT | Mod: HCNC,CPTII,S$GLB, | Performed by: INTERNAL MEDICINE

## 2025-01-07 PROCEDURE — 1101F PT FALLS ASSESS-DOCD LE1/YR: CPT | Mod: HCNC,CPTII,S$GLB, | Performed by: INTERNAL MEDICINE

## 2025-01-07 PROCEDURE — 1159F MED LIST DOCD IN RCRD: CPT | Mod: HCNC,CPTII,S$GLB, | Performed by: INTERNAL MEDICINE

## 2025-01-07 PROCEDURE — 99999 PR PBB SHADOW E&M-EST. PATIENT-LVL IV: CPT | Mod: PBBFAC,HCNC,, | Performed by: INTERNAL MEDICINE

## 2025-01-07 PROCEDURE — 1126F AMNT PAIN NOTED NONE PRSNT: CPT | Mod: HCNC,CPTII,S$GLB, | Performed by: INTERNAL MEDICINE

## 2025-01-07 PROCEDURE — 99214 OFFICE O/P EST MOD 30 MIN: CPT | Mod: HCNC,S$GLB,, | Performed by: INTERNAL MEDICINE

## 2025-01-07 PROCEDURE — 3288F FALL RISK ASSESSMENT DOCD: CPT | Mod: HCNC,CPTII,S$GLB, | Performed by: INTERNAL MEDICINE

## 2025-01-07 RX ORDER — VENLAFAXINE 25 MG/1
TABLET ORAL
COMMUNITY
Start: 2024-09-23

## 2025-01-07 RX ORDER — CLONAZEPAM 0.5 MG/1
TABLET ORAL
COMMUNITY
Start: 2024-12-02

## 2025-01-07 NOTE — PROGRESS NOTES
Patient ID: Silke Dodd is a 89 y.o. female.    Chief Complaint: Annual Exam     Assessment and Plan     1. Stage 3a chronic kidney disease    2. Essential hypertension    3. Hypothyroidism, unspecified type       Assessment & Plan    HYPERTENSION:  - Continue Losartan 50 mg in the morning and 100 mg in the evening for hypertension.  - Continue chlorthalidone 25 mg 3 times per week for hypertension.  - Continue Clonidine patch for hypertension.  - Continue metoprolol 50 mg twice daily.  - Monitor blood pressure  - Noted that blood pressure looks good during the current visit.    HYPOTHYROIDISM:  - Continue levothyroxine 75 mcg for stable hypothyroidism.    ANXIETY:  - Continue clonazepam 0.5 tablet nightly.  - Continue venlafaxine 25 mg.  - Noted that the patient reports having anxiety.  - Confirmed that Dr. Champion prescribed these medications for anxiety management.    HIP PAIN:  - Explained potential side effects of steroid injections for hip pain.  - Recommend Tylenol 500 mg, 2 tablets as needed for hip pain, up to 2 x daily- Noted that the patient is still struggling with intermittent hip pain.  - Reviewed x-ray results and orthopedic nurse practitioner's assessment.  - Advised continued use of prescription-strength Voltaren gel for pain management.  - Confirmed that no hip replacement is currently planned.    CARDIOVASCULAR:  - Detected a very mild systolic murmur during exam, not considered worrisome.  - Continued aspirin 81 mg daily.    FALL PREVENTION:  - Advised the patient to continue using walker for ambulation, especially in the bathroom.  - Recommend keeping floors clean to prevent falls.  - Reviewed safety measures to prevent falls, particularly in the bathroom.  - Advised the patient to maintain clean floors and remove obstacles to prevent falls.  - Noted that the patient reports no recent falls.    WEIGHT MANAGEMENT:  - Observed that the patient's weight has increased slightly, but BMI is still less  than 30.    OTHER INSTRUCTIONS:  - Auscultated lungs and found them to sound good with no crackles or abnormalities.  - Reviewed medications and health maintenance status.  - Discussed importance of maintaining protein intake to preserve muscle mass in older adults.  - Recommend increasing intake of fruits and vegetables for GI health.  - Silke to continue current diet and exercise regimen.    FOLLOW UP:  - Scheduled follow-up visit in 6 months for routine check-up.  - Noted that this is an annual exam visit.         No follow-ups on file.   HPI     History of Present Illness    CHIEF COMPLAINT:  Silke presents for an annual wellness visit.    HPI:  Silke is a 89-year-old female residing in an independent living facility. She ambulates with a walker and reports no recent falls. She has occasional anxiety, for which she previously took Wellbutrin but had excessive stimulation. She now takes a different medication for anxiety. Silke reports right hip pain, evaluated by an orthopedic nurse practitioner.  She has tried Voltaren gel (prescription strength) and occasionally uses Tylenol for pain relief. Silke reports elevated blood pressure the previous night and notes mild swelling in one foot.    Silke denies any urinary symptoms or chronic pain that significantly impacts her daily activities. She denies any recent COVID-19 infections.      ROS:  General: -weight gain  Genitourinary: -difficulty urinating  Musculoskeletal: +joint pain, -muscle pain  Psychiatric: +anxiety, +depression         Review of Systems    I personally reviewed past medical, family and social history.     Objective    Vitals:    01/07/25 1405   BP: 124/62   Pulse: 75      Wt Readings from Last 3 Encounters:   01/07/25 1405 74.9 kg (165 lb 2 oz)   10/23/24 1341 72.3 kg (159 lb 6.3 oz)   09/05/24 1520 72.3 kg (159 lb 6.3 oz)      Body mass index is 28.34 kg/m².     Physical Exam    Vitals: BMI less than 30.  Cardiovascular: Mild systolic  murmur.  Respiratory: Normal respiratory effort. Clear to auscultation bilaterally. No rales. No rhonchi. No wheezing.        Reference     : Visit today included increased complexity associated with the care of the episodic problem Essential hypertension [I10] addressed and managing the longitudinal care of the patient due to the serious and/or complex managed problem(s)     Active Problem List with Overview Notes    Diagnosis Date Noted    Stage 3a chronic kidney disease 03/16/2021    Aortic atherosclerosis 12/07/2017     Abd US 10/24/14        Essential hypertension 12/07/2017    Peripheral vascular disease 03/30/2017    Osteoporosis, post-menopausal 04/23/2013    Hypothyroid     Mixed incontinence urge and stress 05/03/2021    History of subdural hematoma 05/03/2021    History of ovarian cancer 07/06/2020    Greater trochanteric bursitis of left hip 06/04/2020    Idiopathic peripheral neuropathy 02/26/2019    Gastroesophageal reflux disease 08/02/2018    Nonulcer dyspepsia 08/02/2018    Carotid arterial disease 12/07/2017     Carotid US 3/28/14      Choroidal neovascularization, 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 M, W, F only    cloNIDine 0.1 mg/24 hr td ptwk (CATAPRES) 0.1 mg/24 hr 1 patch every 7 days.    losartan (COZAAR) 100 MG tablet Take 1 tablet (100 mg total) by mouth once daily.    losartan (COZAAR) 50 MG tablet Take 50 mg by mouth once daily. am    metoprolol succinate (TOPROL-XL) 25 MG 24 hr tablet 25 mg every morning.    metoprolol succinate (TOPROL-XL) 50 MG 24 hr tablet Take 50 mg by mouth every evening.    cloNIDine (CATAPRES) 0.1 MG tablet 1 tablet by mouth  as needed every 1 to 2  hours for SBP over 160, max 6 tabs/day              Medication List with Changes/Refills   Current Medications    ACETAMINOPHEN (TYLENOL) 325 MG TABLET    Take 325 mg by mouth every 6 (six) hours as needed for Pain.    ASPIRIN (ECOTRIN) 81 MG EC TABLET    Take 81 mg by mouth Daily.    CHLORTHALIDONE (HYGROTEN) 25 MG TAB    25 mg. M, W, F only    CLONAZEPAM (KLONOPIN) 0.5 MG TABLET    Take by mouth.    CLONIDINE 0.1 MG/24 HR TD PTWK (CATAPRES) 0.1 MG/24 HR    1 patch every 7 days.    DICLOFENAC SODIUM (VOLTAREN) 1 % GEL    Apply 2 g topically once daily.    ERGOCALCIFEROL (ERGOCALCIFEROL) 50,000 UNIT CAP    Take 50,000 Units by mouth every 7 days.    GUAIFENESIN (MUCINEX) 600 MG 12 HR TABLET    Take 1,200 mg by mouth 2 (two) times daily.    LEVOTHYROXINE (SYNTHROID) 75 MCG TABLET    TAKE 1 TABLET BY MOUTH EVERY DAY    LOSARTAN (COZAAR) 100 MG TABLET    Take 1 tablet (100 mg total) by mouth once daily.    LOSARTAN (COZAAR) 50 MG TABLET    Take 50 mg by mouth once daily. am    METOPROLOL SUCCINATE (TOPROL-XL) 50 MG 24 HR TABLET    Take 50 mg by mouth every evening.    VENLAFAXINE (EFFEXOR) 25 MG TAB       Discontinued Medications    BUPROPION (WELLBUTRIN) 75 MG TABLET    Take 75 mg by mouth 2 (two) times daily. Taking 1 tablet QD    CLONIDINE (CATAPRES) 0.1 MG TABLET    1 tablet by mouth  as needed every 1 to 2 hours for SBP over 160, max 6 tabs/day    METOPROLOL SUCCINATE (TOPROL-XL) 25 MG 24 HR TABLET    25 mg every morning.         This note was generated with the assistance of ambient listening technology. Verbal consent was obtained by the patient and accompanying visitor(s) for the recording of patient appointment to facilitate this note. I attest to having reviewed and edited the generated note for accuracy, though some syntax or spelling errors may persist. Please contact the author of this note for any clarification.

## 2025-01-29 ENCOUNTER — OFFICE VISIT (OUTPATIENT)
Dept: PODIATRY | Facility: CLINIC | Age: OVER 89
End: 2025-01-29
Payer: MEDICARE

## 2025-01-29 VITALS — HEIGHT: 64 IN | WEIGHT: 160.06 LBS | BODY MASS INDEX: 27.33 KG/M2

## 2025-01-29 DIAGNOSIS — M20.42 HAMMER TOES OF BOTH FEET: ICD-10-CM

## 2025-01-29 DIAGNOSIS — G60.9 IDIOPATHIC PERIPHERAL NEUROPATHY: ICD-10-CM

## 2025-01-29 DIAGNOSIS — M20.41 HAMMER TOES OF BOTH FEET: ICD-10-CM

## 2025-01-29 DIAGNOSIS — I73.9 PERIPHERAL VASCULAR DISEASE: Primary | ICD-10-CM

## 2025-01-29 DIAGNOSIS — B35.1 ONYCHOMYCOSIS DUE TO DERMATOPHYTE: ICD-10-CM

## 2025-01-29 PROCEDURE — 99999 PR PBB SHADOW E&M-EST. PATIENT-LVL II: CPT | Mod: PBBFAC,HCNC,, | Performed by: PODIATRIST

## 2025-01-29 NOTE — PROGRESS NOTES
Subjective:      Patient ID: Silke Dodd is a 89 y.o. female.    Chief Complaint: No chief complaint on file.      Silke is a 89 y.o. female who presents to the clinic for evaluation and treatment of high risk feet. 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 The patient's chief complaint consists of toenails in need of trimming.  Denies being painful with wearing shoe gear.  Has not attempted to self treat.  Denies experiencing neuropathy pain with today's exam.  Denies any additional pedal complaints.       PCP: Anibal Fisher,     Date Last Seen by PCP: 1/25    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    Ovarian cancer     Panic disorder        Past Surgical History:   Procedure Laterality Date    CATARACT EXTRACTION Bilateral     corrected for monovision    HYSTERECTOMY  1975    Cancer cells    MASTECTOMY Right 2016    TOTAL ABDOMINAL HYSTERECTOMY W/ BILATERAL SALPINGOOPHORECTOMY  1975       Family History   Problem Relation Name Age of Onset    Cancer Mother      Breast cancer Mother          80s    Diabetes Father      Stroke Father      Coronary artery disease Father         Social History     Socioeconomic History    Marital status:     Number of children: 3   Tobacco Use    Smoking status: Never    Smokeless tobacco: Never   Substance and Sexual Activity    Alcohol use: Yes     Alcohol/week: 5.0 standard drinks of alcohol     Types: 5 Glasses of wine per week    Drug use: No    Sexual activity: Never     Birth control/protection: Surgical     Social Drivers of Health     Financial  Resource Strain: Low Risk  (2/22/2023)    Overall Financial Resource Strain (CARDIA)     Difficulty of Paying Living Expenses: Not very hard   Food Insecurity: No Food Insecurity (5/26/2024)    Hunger Vital Sign     Worried About Running Out of Food in the Last Year: Never true     Ran Out of Food in the Last Year: Never true   Transportation Needs: No Transportation Needs (2/22/2023)    PRAPARE - Transportation     Lack of Transportation (Medical): No     Lack of Transportation (Non-Medical): No   Physical Activity: Unknown (5/26/2024)    Exercise Vital Sign     Days of Exercise per Week: Patient declined   Stress: Stress Concern Present (5/26/2024)    [employer] - Occupational Stress Questionnaire     Feeling of Stress : To some extent   Housing Stability: Unknown (2/22/2023)    Housing Stability Vital Sign     Unable to Pay for Housing in the Last Year: No     Unstable Housing in the Last Year: No       Current Outpatient Medications   Medication Sig Dispense Refill    acetaminophen (TYLENOL) 325 MG tablet Take 325 mg by mouth every 6 (six) hours as needed for Pain.      aspirin (ECOTRIN) 81 MG EC tablet Take 81 mg by mouth Daily.      chlorthalidone (HYGROTEN) 25 MG Tab 25 mg. M, W, F only      clonazePAM (KLONOPIN) 0.5 MG tablet Take by mouth.      cloNIDine 0.1 mg/24 hr td ptwk (CATAPRES) 0.1 mg/24 hr 1 patch every 7 days.      diclofenac sodium (VOLTAREN) 1 % Gel Apply 2 g topically once daily. 20 g 1    ergocalciferol (ERGOCALCIFEROL) 50,000 unit Cap Take 50,000 Units by mouth every 7 days.      guaiFENesin (MUCINEX) 600 mg 12 hr tablet Take 1,200 mg by mouth 2 (two) times daily.      levothyroxine (SYNTHROID) 75 MCG tablet TAKE 1 TABLET BY MOUTH EVERY DAY 90 tablet 0    losartan (COZAAR) 100 MG tablet Take 1 tablet (100 mg total) by mouth once daily. 90 tablet 1    losartan (COZAAR) 50 MG tablet Take 50 mg by mouth once daily. am      metoprolol succinate (TOPROL-XL) 50 MG 24  hr tablet Take 50 mg by mouth every evening.      venlafaxine (EFFEXOR) 25 MG Tab        No current facility-administered medications for this visit.       Review of patient's allergies indicates:  No Known Allergies      Review of Systems   Constitutional: Negative for chills.   Cardiovascular:  Negative for claudication and leg swelling.   Skin:  Positive for color change and nail changes.   Musculoskeletal:  Negative for joint pain and joint swelling.   Gastrointestinal:  Negative for nausea and vomiting.   Neurological:  Positive for numbness and paresthesias.   Psychiatric/Behavioral:  Negative for altered mental status.            Objective:      Physical Exam  Constitutional:       General: She is not in acute distress.     Appearance: She is well-developed. She is not diaphoretic.   Cardiovascular:      Pulses:           Dorsalis pedis pulses are 1+ on the right side and 1+ on the left side.        Posterior tibial pulses are 1+ on the right side and 1+ on the left side.      Comments: CFT 3 seconds bilateral.  Pedal hair growth decreased bilateral.   Mild varicosities noted bilateral.  Mild nonpitting edema noted to the RLE and moderate non pitting edema noted to the Lt. Forefoot.  Toes are cool to touch bilateral.    Musculoskeletal:         General: No tenderness.      Comments: Muscle strength 5/5 in all muscle groups bilateral.  No tenderness nor crepitation with ROM of foot/ankle joints bilateral.  No pain with palpation to bilateral foot and ankle.  Bilateral hallux abducto valgus.  Bilateral semi-rigid contracture of toes 2-5.       Skin:     General: Skin is warm and dry.      Capillary Refill: Capillary refill takes less than 2 seconds.      Coloration: Skin is not pale.      Findings: No abrasion, bruising, burn, ecchymosis, erythema, laceration, lesion, petechiae or rash.      Comments: Pedal skin appears thin bilateral.  Toenails x 10 appear thickened by 2 mm, elongated by 4 mm and discolored  with subungual debris.  Mild incurvation noted to medial and lateral borders of bilateral hallux.  No localized sign of infection noted.     Neurological:      Mental Status: She is alert and oriented to person, place, and time.      Sensory: Sensory deficit present.      Motor: No weakness or atrophy.      Comments: Protective sensation per [place] monofilament is absent bilateral.  Light touch is absent bilateral.             Assessment:       Encounter Diagnoses   Name Primary?    Peripheral vascular disease Yes    Idiopathic peripheral neuropathy     Hammer toes of both feet     Onychomycosis due to dermatophyte              Plan:       Diagnoses and all orders for this visit:    Peripheral vascular disease    Idiopathic peripheral neuropathy    Hammer toes of both feet    Onychomycosis due to dermatophyte          I counseled the patient on her conditions, their implications and medical management.    PVD and neuropathy remain stable with today's exam, will continue to monitor.    Advised to continue wearing shoe gear that accommodates for digital deformities.    Explained that edema of the LT. LE is secondary to venous insufficiency.  Advised to elevate the limb when overtly edematous.      With patient's permission, nails were aggressively reduced and debrided x 10 to their soft tissue attachment mechanically and with electric , removing all offending nail and debris. Patient relates relief following the procedure. She will continue to monitor the areas daily, inspect her feet, wear protective shoe gear when ambulatory, moisturizer to maintain skin integrity and follow in this office in approximately 3 months, sooner p.r.n.    Narayan Jonas DPM

## 2025-01-31 ENCOUNTER — OFFICE VISIT (OUTPATIENT)
Dept: FAMILY MEDICINE | Facility: CLINIC | Age: OVER 89
End: 2025-01-31
Payer: MEDICARE

## 2025-01-31 DIAGNOSIS — H10.31 ACUTE BACTERIAL CONJUNCTIVITIS OF RIGHT EYE: Primary | ICD-10-CM

## 2025-01-31 PROCEDURE — 98005 SYNCH AUDIO-VIDEO EST LOW 20: CPT | Mod: HCNC,95,, | Performed by: PHYSICIAN ASSISTANT

## 2025-01-31 RX ORDER — OFLOXACIN 3 MG/ML
2 SOLUTION/ DROPS OPHTHALMIC 2 TIMES DAILY
Qty: 10 ML | Refills: 0 | Status: SHIPPED | OUTPATIENT
Start: 2025-01-31 | End: 2025-02-07

## 2025-01-31 NOTE — PROGRESS NOTES
Subjective:      Patient ID: Silke Dodd is a 89 y.o. female.    Chief Complaint: Conjunctivitis    HPI  Patient's active medical problems include subdural hematoma, CAD, HTN, CKD3a, breast and ovarian cancer, hypothyroidism, and GERD.    Patient has been having right eye discharge and redness for a week.  Does not wear contacts or glasses.  Started with rhinorrhea and resolved with claritin.  Denies fever.    Review of Systems   Constitutional:  Negative for activity change, chills, fever and unexpected weight change.   HENT:  Negative for hearing loss, rhinorrhea and trouble swallowing.    Eyes:  Positive for discharge and redness. Negative for visual disturbance.   Respiratory:  Negative for chest tightness, shortness of breath and wheezing.    Cardiovascular:  Negative for chest pain and palpitations.   Gastrointestinal:  Negative for blood in stool, constipation, diarrhea and vomiting.   Endocrine: Negative for polydipsia and polyuria.   Genitourinary:  Negative for difficulty urinating and hematuria.   Musculoskeletal:  Negative for neck pain.   Neurological:  Negative for headaches.   Psychiatric/Behavioral:  Negative for dysphoric mood.        Objective:   There were no vitals taken for this visit.    Physical Exam  Constitutional:       Appearance: Normal appearance.   HENT:      Head: Normocephalic and atraumatic.      Right Ear: External ear normal.      Left Ear: External ear normal.   Eyes:      General:         Right eye: Discharge present.         Left eye: No discharge.      Conjunctiva/sclera:      Right eye: Right conjunctiva is injected.      Left eye: Left conjunctiva is not injected.   Pulmonary:      Effort: No respiratory distress.   Neurological:      Mental Status: She is alert. Mental status is at baseline.   Psychiatric:         Mood and Affect: Mood normal.         Behavior: Behavior normal.       Assessment:      1. Acute bacterial conjunctivitis of right eye       Plan:   1. Acute  bacterial conjunctivitis of right eye (Primary)  Use warm moist compresses on right eye.  - ofloxacin (OCUFLOX) 0.3 % ophthalmic solution; Place 2 drops into the right eye 2 (two) times a day. for 7 days  Dispense: 10 mL; Refill: 0    Follow up as needed.  The patient location is:  Patient Home   The chief complaint leading to consultation is: conjunctivitis  Visit type: Virtual visit with synchronous audio and video  Total time spent with patient: 13 minutes  Each patient to whom he or she provides medical services by telemedicine is:  (1) informed of the relationship between the physician and patient and the respective role of any other health care provider with respect to management of the patient; and (2) notified that he or she may decline to receive medical services by telemedicine and may withdraw from such care at any time.

## 2025-02-25 ENCOUNTER — PATIENT MESSAGE (OUTPATIENT)
Dept: FAMILY MEDICINE | Facility: CLINIC | Age: OVER 89
End: 2025-02-25
Payer: MEDICARE

## 2025-02-25 DIAGNOSIS — I73.9 PERIPHERAL VASCULAR DISEASE: ICD-10-CM

## 2025-02-25 DIAGNOSIS — M70.62 GREATER TROCHANTERIC BURSITIS OF LEFT HIP: ICD-10-CM

## 2025-02-25 DIAGNOSIS — M81.0 OSTEOPOROSIS, POST-MENOPAUSAL: ICD-10-CM

## 2025-02-25 DIAGNOSIS — G60.9 IDIOPATHIC PERIPHERAL NEUROPATHY: Primary | ICD-10-CM

## 2025-03-20 ENCOUNTER — PATIENT MESSAGE (OUTPATIENT)
Dept: PODIATRY | Facility: CLINIC | Age: OVER 89
End: 2025-03-20
Payer: MEDICARE

## 2025-03-21 ENCOUNTER — TELEPHONE (OUTPATIENT)
Dept: ORTHOPEDICS | Facility: CLINIC | Age: OVER 89
End: 2025-03-21
Payer: MEDICARE

## 2025-03-21 NOTE — TELEPHONE ENCOUNTER
Appt scheduled for pt to see Dr. Jauregui on 4/1/25 at 1:30. Pt daughter verbalized understanding.

## 2025-03-21 NOTE — TELEPHONE ENCOUNTER
----- Message from JOHN Gutierrez sent at 3/21/2025 12:46 PM CDT -----    ----- Message -----  From: Carmelita Holt CMA  Sent: 3/21/2025  10:30 AM CDT  To: JOHN Herrmann      ----- Message -----  From: Rudy Luna  Sent: 3/21/2025   8:14 AM CDT  To: Naheed GIBSON Staff    Daughter / Janneth Gallegos would like a callback regarding the patient's options for her right hip pain.[phone]

## 2025-04-01 ENCOUNTER — OFFICE VISIT (OUTPATIENT)
Dept: ORTHOPEDICS | Facility: CLINIC | Age: OVER 89
End: 2025-04-01
Payer: MEDICARE

## 2025-04-01 VITALS — WEIGHT: 160.06 LBS | BODY MASS INDEX: 27.33 KG/M2 | HEIGHT: 64 IN

## 2025-04-01 DIAGNOSIS — M70.61 TROCHANTERIC BURSITIS OF RIGHT HIP: Primary | ICD-10-CM

## 2025-04-01 PROCEDURE — 99999 PR PBB SHADOW E&M-EST. PATIENT-LVL III: CPT | Mod: PBBFAC,HCNC,, | Performed by: ORTHOPAEDIC SURGERY

## 2025-04-01 RX ADMIN — TRIAMCINOLONE ACETONIDE 40 MG: 40 INJECTION, SUSPENSION INTRA-ARTICULAR; INTRAMUSCULAR at 01:04

## 2025-04-07 RX ORDER — TRIAMCINOLONE ACETONIDE 40 MG/ML
40 INJECTION, SUSPENSION INTRA-ARTICULAR; INTRAMUSCULAR
Status: DISCONTINUED | OUTPATIENT
Start: 2025-04-01 | End: 2025-04-07 | Stop reason: HOSPADM

## 2025-04-07 NOTE — PROGRESS NOTES
Chief Complaint   Patient presents with    Left Hip - Pain    Right Hip - Pain      HPI:   This is a 89 y.o. who presents to clinic today for right hip pain for the past 2 months after no known trauma. Complains of pain while sleeping on side and when descending stairs. Pain is dull. No numbness or tingling. No associated signs or symptoms.      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    Ovarian cancer     Panic disorder      Past Surgical History:   Procedure Laterality Date    CATARACT EXTRACTION Bilateral     corrected for monovision    HYSTERECTOMY  1975    Cancer cells    MASTECTOMY Right 2016    TOTAL ABDOMINAL HYSTERECTOMY W/ BILATERAL SALPINGOOPHORECTOMY  1975     Medications Ordered Prior to Encounter[1]  Review of patient's allergies indicates:  No Known Allergies  Family History   Problem Relation Name Age of Onset    Cancer Mother      Breast cancer Mother          80s    Diabetes Father      Stroke Father      Coronary artery disease Father       Social History[2]    Review of Systems:  Constitutional:  Denies fever or chills   Eyes:  Denies change in visual acuity   HENT:  Denies nasal congestion or sore throat   Respiratory:  Denies cough or shortness of breath   Cardiovascular:  Denies chest pain or edema   GI:  Denies abdominal pain, nausea, vomiting, bloody stools or diarrhea   :  Denies dysuria   Integument:  Denies rash   Neurologic:  Denies headache, focal weakness or sensory changes   Endocrine:  Denies polyuria or polydipsia   Lymphatic:  Denies swollen glands   Psychiatric:  Denies depression or anxiety     Physical Exam:   Constitutional:  Well developed, well nourished, no acute distress, non-toxic appearance   Integument:  Well hydrated, no rash   Lymphatic:  No  lymphadenopathy noted   Neurologic:  Alert & oriented x 3  Psychiatric:  Speech and behavior appropriate     Bilateral Hip Exam    left Hip Exam   left hip exam performed same as contralateral hip and is normal.     right Hip Exam   Tenderness   The patient is experiencing tenderness in the greater trochanter.  Range of Motion   The patient has normal hip ROM.  Muscle Strength   Abduction: 4/5   Other   Erythema: absent  Sensation: normal  Pulse: present    X-rays were personally reviewed by me and findings discussed with the patient.  2 views of the right hip show no fractures or dislocations    Trochanteric bursitis of right hip           Using an aseptic technique, I injected 5 cc of lidocaine 1% without and 1 cc of kenalog 40mg into the right Hip. The patient tolerated this well. I will have them return to clinic in 6 weeks.             [1]   Current Outpatient Medications on File Prior to Visit   Medication Sig Dispense Refill    acetaminophen (TYLENOL) 325 MG tablet Take 325 mg by mouth every 6 (six) hours as needed for Pain.      aspirin (ECOTRIN) 81 MG EC tablet Take 81 mg by mouth Daily.      chlorthalidone (HYGROTEN) 25 MG Tab 25 mg. M, W, F only      clonazePAM (KLONOPIN) 0.5 MG tablet Take by mouth.      cloNIDine 0.1 mg/24 hr td ptwk (CATAPRES) 0.1 mg/24 hr 1 patch every 7 days.      diclofenac sodium (VOLTAREN) 1 % Gel Apply 2 g topically once daily. 20 g 1    ergocalciferol (ERGOCALCIFEROL) 50,000 unit Cap Take 50,000 Units by mouth every 7 days.      guaiFENesin (MUCINEX) 600 mg 12 hr tablet Take 1,200 mg by mouth 2 (two) times daily.      levothyroxine (SYNTHROID) 75 MCG tablet TAKE 1 TABLET BY MOUTH EVERY DAY 90 tablet 0    losartan (COZAAR) 100 MG tablet Take 1 tablet (100 mg total) by mouth once daily. 90 tablet 1    losartan (COZAAR) 50 MG tablet Take 50 mg by mouth once daily. am      metoprolol succinate (TOPROL-XL) 50 MG 24 hr tablet Take 50 mg by mouth every evening.      venlafaxine  (EFFEXOR) 25 MG Tab        No current facility-administered medications on file prior to visit.   [2]   Social History  Socioeconomic History    Marital status:     Number of children: 3   Tobacco Use    Smoking status: Never    Smokeless tobacco: Never   Substance and Sexual Activity    Alcohol use: Yes     Alcohol/week: 5.0 standard drinks of alcohol     Types: 5 Glasses of wine per week    Drug use: No    Sexual activity: Never     Birth control/protection: Surgical     Social Drivers of Health     Financial Resource Strain: Medium Risk (3/30/2025)    Overall Financial Resource Strain (CARDIA)     Difficulty of Paying Living Expenses: Somewhat hard   Food Insecurity: No Food Insecurity (3/30/2025)    Hunger Vital Sign     Worried About Running Out of Food in the Last Year: Never true     Ran Out of Food in the Last Year: Never true   Transportation Needs: No Transportation Needs (3/30/2025)    PRAPARE - Transportation     Lack of Transportation (Medical): No     Lack of Transportation (Non-Medical): No   Physical Activity: Inactive (3/30/2025)    Exercise Vital Sign     Days of Exercise per Week: 0 days     Minutes of Exercise per Session: 30 min   Stress: No Stress Concern Present (3/30/2025)    [employer] [place] [employer] - Occupational Stress Questionnaire     Feeling of Stress : Only a little   Housing Stability: Low Risk  (3/30/2025)    Housing Stability Vital Sign     Unable to Pay for Housing in the Last Year: No     Number of Times Moved in the Last Year: 0     Homeless in the Last Year: No

## 2025-04-15 ENCOUNTER — TELEPHONE (OUTPATIENT)
Dept: ORTHOPEDICS | Facility: CLINIC | Age: OVER 89
End: 2025-04-15
Payer: MEDICARE

## 2025-04-15 NOTE — TELEPHONE ENCOUNTER
Left  message with Janneth's spouse that we would get pt scheduled in the next two weeks.    [Last Pap: ___] : Last Pap: [unfilled] [Fibroids] : no fibroids [Abn Paps] : no abnormal pap smears [Breast Disease] : no breast disease [STI's] : no STI's [Infertility] : no infertility [Cysts] : no cysts [OC Use] : no OC use [Last Mammo: ___] : Last Mammo: none

## 2025-04-15 NOTE — TELEPHONE ENCOUNTER
----- Message from Med Assistant May sent at 4/15/2025 11:21 AM CDT -----  Contact: daughter, adriana  Calling to reschedule 05/22 appt.  Daughter states the injection did not help mother, and she wants to discuss other options. (Sooner more than later)Call back

## 2025-04-23 ENCOUNTER — OFFICE VISIT (OUTPATIENT)
Dept: OPTOMETRY | Facility: CLINIC | Age: OVER 89
End: 2025-04-23
Payer: MEDICARE

## 2025-04-23 DIAGNOSIS — Z96.1 BILATERAL PSEUDOPHAKIA: ICD-10-CM

## 2025-04-23 DIAGNOSIS — H35.052 CHOROIDAL NEOVASCULARIZATION, LEFT EYE: ICD-10-CM

## 2025-04-23 DIAGNOSIS — Z98.890 HISTORY OF BILATERAL YAG LASER IRIDOTOMY: ICD-10-CM

## 2025-04-23 DIAGNOSIS — H40.011 OAG (OPEN ANGLE GLAUCOMA) SUSPECT, LOW RISK, RIGHT: ICD-10-CM

## 2025-04-23 DIAGNOSIS — H43.813 POSTERIOR VITREOUS DETACHMENT, BOTH EYES: Primary | ICD-10-CM

## 2025-04-23 PROCEDURE — 92134 CPTRZ OPH DX IMG PST SGM RTA: CPT | Mod: HCNC,S$GLB,, | Performed by: OPTOMETRIST

## 2025-04-23 PROCEDURE — 1126F AMNT PAIN NOTED NONE PRSNT: CPT | Mod: HCNC,CPTII,S$GLB, | Performed by: OPTOMETRIST

## 2025-04-23 PROCEDURE — 3288F FALL RISK ASSESSMENT DOCD: CPT | Mod: HCNC,CPTII,S$GLB, | Performed by: OPTOMETRIST

## 2025-04-23 PROCEDURE — 1101F PT FALLS ASSESS-DOCD LE1/YR: CPT | Mod: HCNC,CPTII,S$GLB, | Performed by: OPTOMETRIST

## 2025-04-23 PROCEDURE — 1159F MED LIST DOCD IN RCRD: CPT | Mod: HCNC,CPTII,S$GLB, | Performed by: OPTOMETRIST

## 2025-04-23 PROCEDURE — 92004 COMPRE OPH EXAM NEW PT 1/>: CPT | Mod: HCNC,S$GLB,, | Performed by: OPTOMETRIST

## 2025-04-23 PROCEDURE — 99999 PR PBB SHADOW E&M-EST. PATIENT-LVL III: CPT | Mod: PBBFAC,HCNC,, | Performed by: OPTOMETRIST

## 2025-04-23 NOTE — PATIENT INSTRUCTIONS
"DRY EYES -- BURNING OR JAMEE SYMPTOMS:  Use Over The Counter artificial tears as needed for dry eye symptoms.   Some common brands include:  Systane, Optive, Refresh, and Thera-Tears.  These drops can be used as frequently as desired, but may be most helpful use during long periods of concentrated work.  For example, reading / working at the computer. Start with 3-4x per day.     Nighttime Ophthalmic gel or ointments are available: Refresh PM, Genteal, and Lacrilube.    Avoid drops that "get redness out" (Visine, Murine, Clear Eyes), as these may contain medication that could further irritate the eyes, especially with chronic use.    ALLERGY EYES -- ITCHING SYMPTOMS:  Over the counter medications include--Pataday, Zaditor, and Alaway.  Use as directed 1-2 drops daily for symptoms of itching / watering eyes.  These drops will not help for dry eye or exposure symptoms.    REDNESS RELIEF:  Lumify---is a good redness reliever that will not cause irritation if used chronically.        Using the Amsler Grid  If you are at risk for vision loss, you may be told to check your eyesight regularly using the Amsler grid. Below is the grid and instructions for using it.         The Amsler grid helps you track changes in your vision.    How to Use the Amsler Grid  Use the grid in a well-lighted area.  Wear glasses or contact lenses if you usually wear them.  Hold the grid at your normal reading distance (about 16 inches).  Cover your left eye.  With your right eye, look at the dot in the center of the grid.  While looking at the dot, notice if any of the lines look wavy, if any lines disappear, or if the boxes change shape.  Write down on a piece of paper any vision changes from the last time you used the grid.  Now repeat the exercise, this time covering your right eye.  Call your doctor right away if you notice any vision changes.  How Often Should I Check My Vision?  Use the Amsler grid as often as your eye doctor suggests. " Keep the grid where youll remember to use it. Call your eye doctor right away if you notice any changes with your eyesight. This includes if your vision improves.  © 8445-9900 [employer], [address] [place] [address], [place] [address]. All rights reserved. This information is not intended as a substitute for professional medical care. Always follow your healthcare professional's instructions. FLASHES / FLOATERS / POSTERIOR VITREOUS DETACHMENT    Call the clinic if you have any further changes in symptoms.  Including:  Increased numbers of floaters or flashing lights, dimness or darkness that moves through or stays constant in your vision, or any pain in the eye (s).    You may sometimes see small specks or clouds moving in your field of vision.  They are called FLOATERS.  You can often see them when looking at a plain background, like a blank wall or blue charlie.  Floaters are actually tiny clumps of gel or cells inside the VITREOUS, the clear jelly-like fluid that fills the inside of your eye.    While these objects look like they are in front of your eye, they are actually floating inside.  What you see are the shadows they cast on the RETINA, the nerve layer at the back of the eye that senses light and allows you to see.      POSTERIOR VITREOUS DETACHMENT    The appearance of new floaters may be alarming.  If you suddenly develop new floaters, you should contact your eye care professional  right away.    The retina can tear if the shrinking vitreous pulls away from the wall of the eye.  This sometimes causes a small amount of bleeding in the eye that may appear as new floaters.    A torn retina is always a serious problem, since it can lead to a retinal detachment.  You should see your eye care professional as soon as possible if:    even one new floater appears suddenly;  you see sudden flashes of light;  you notice other symptoms, like the loss of side vision, or a curtain closes down in your  vision        POSTERIOR VITREOUS DETACHMENT is more common for people who:    are nearsighted;  have had cataract surgery;  have had YAG laser surgery of the eye;  have had inflammation inside the eye;  are over age 60.      While some floaters may remain visible, many of them will fade over time and become less noticeable.  Even if you've had some floaters for years, you should have your eyes checked as soon as possible if you notice new ones.    FLASHING LIGHTS    When the vitreous gel rubs or pulls on the retina, you may see what look like flashing lights or lightning streaks.  These flashes can appear off and on for several weeks or months.      Some people experience flashes of light that appear as jagged lines or heat waves in both eyes, lasting 10-20 minutes.  These flashes are caused by a spasm of blood vessels in the brain, which is called a migraine.    If a headache follows these flashes, it's called a migraine headache.  If   no headache occurs, these flashes are called Ophthalmic or Ocular Migraine.

## 2025-04-23 NOTE — PROGRESS NOTES
HPI    Pt presents today for a dfe.  Pt c/o some blurriness OD.  Pt does not wear any corrective lenses.  Pt does not instill any gtts.  Denies any ocular pain/disc.  Denies f/f.    Last edited by Isabel Nguyen on 4/23/2025  4:18 PM.            Assessment /Plan     For exam results, see Encounter Report.    Posterior vitreous detachment, both eyes    Choroidal neovascularization, left eye  -     Posterior Segment OCT Retina-Both eyes    OAG (open angle glaucoma) suspect, low risk, right    History of bilateral YAG laser iridotomy    Bilateral pseudophakia      RD precautions given and reviewed. Patient knows to call/ message if any further changes in symptoms occur.  Longstanding, inactive nvm sup to disc OS --update OPTOS next exam   Mild dry changes w/ minimal drusen // good macular health for age   3,4. H/o LPI due to narrow angle prior to CE   OCT rnfl 4/2025 --nerve thinning OD   G 67 onl TS 77 NI 34   G 112 wnl     5.   Stable OU, s/p yag cap     Updated specs Rx, gave copy, can consider bifocal, but near vision adequate per pt activities     Discussed and educated patient on current findings /plan.  RTC 1 year w/ OPTOS + DFE, prn if any changes / issues

## 2025-05-13 ENCOUNTER — OFFICE VISIT (OUTPATIENT)
Dept: FAMILY MEDICINE | Facility: CLINIC | Age: OVER 89
End: 2025-05-13
Payer: MEDICARE

## 2025-05-13 ENCOUNTER — HOSPITAL ENCOUNTER (OUTPATIENT)
Dept: RADIOLOGY | Facility: HOSPITAL | Age: OVER 89
Discharge: HOME | End: 2025-05-13
Attending: ORTHOPAEDIC SURGERY
Payer: MEDICARE

## 2025-05-13 ENCOUNTER — OFFICE VISIT (OUTPATIENT)
Dept: ORTHOPEDICS | Facility: CLINIC | Age: OVER 89
End: 2025-05-13
Payer: MEDICARE

## 2025-05-13 ENCOUNTER — OFFICE VISIT (OUTPATIENT)
Dept: PODIATRY | Facility: CLINIC | Age: OVER 89
End: 2025-05-13
Payer: MEDICARE

## 2025-05-13 VITALS
WEIGHT: 166 LBS | HEART RATE: 62 BPM | SYSTOLIC BLOOD PRESSURE: 134 MMHG | DIASTOLIC BLOOD PRESSURE: 62 MMHG | BODY MASS INDEX: 28.34 KG/M2 | OXYGEN SATURATION: 95 % | TEMPERATURE: 98 F | HEIGHT: 64 IN

## 2025-05-13 VITALS — BODY MASS INDEX: 27.33 KG/M2 | WEIGHT: 160.06 LBS | HEIGHT: 64 IN

## 2025-05-13 VITALS — HEIGHT: 64 IN | BODY MASS INDEX: 27.33 KG/M2 | WEIGHT: 160.06 LBS

## 2025-05-13 DIAGNOSIS — B35.1 ONYCHOMYCOSIS DUE TO DERMATOPHYTE: ICD-10-CM

## 2025-05-13 DIAGNOSIS — M20.41 HAMMER TOES OF BOTH FEET: ICD-10-CM

## 2025-05-13 DIAGNOSIS — M25.562 ACUTE PAIN OF LEFT KNEE: ICD-10-CM

## 2025-05-13 DIAGNOSIS — R05.1 ACUTE COUGH: Primary | ICD-10-CM

## 2025-05-13 DIAGNOSIS — M25.562 ACUTE PAIN OF LEFT KNEE: Primary | ICD-10-CM

## 2025-05-13 DIAGNOSIS — I73.9 PERIPHERAL VASCULAR DISEASE: Primary | ICD-10-CM

## 2025-05-13 DIAGNOSIS — G60.9 IDIOPATHIC PERIPHERAL NEUROPATHY: ICD-10-CM

## 2025-05-13 DIAGNOSIS — M17.12 PRIMARY OSTEOARTHRITIS OF LEFT KNEE: Primary | ICD-10-CM

## 2025-05-13 DIAGNOSIS — M20.42 HAMMER TOES OF BOTH FEET: ICD-10-CM

## 2025-05-13 PROCEDURE — 20610 DRAIN/INJ JOINT/BURSA W/O US: CPT | Mod: HCNC,LT,S$GLB, | Performed by: ORTHOPAEDIC SURGERY

## 2025-05-13 PROCEDURE — 99999 PR PBB SHADOW E&M-EST. PATIENT-LVL III: CPT | Mod: PBBFAC,HCNC,, | Performed by: STUDENT IN AN ORGANIZED HEALTH CARE EDUCATION/TRAINING PROGRAM

## 2025-05-13 PROCEDURE — 99999 PR PBB SHADOW E&M-EST. PATIENT-LVL II: CPT | Mod: PBBFAC,HCNC,, | Performed by: PODIATRIST

## 2025-05-13 PROCEDURE — 1159F MED LIST DOCD IN RCRD: CPT | Mod: CPTII,HCNC,S$GLB, | Performed by: STUDENT IN AN ORGANIZED HEALTH CARE EDUCATION/TRAINING PROGRAM

## 2025-05-13 PROCEDURE — 99214 OFFICE O/P EST MOD 30 MIN: CPT | Mod: HCNC,25,S$GLB, | Performed by: ORTHOPAEDIC SURGERY

## 2025-05-13 PROCEDURE — 1160F RVW MEDS BY RX/DR IN RCRD: CPT | Mod: CPTII,HCNC,S$GLB, | Performed by: ORTHOPAEDIC SURGERY

## 2025-05-13 PROCEDURE — 1100F PTFALLS ASSESS-DOCD GE2>/YR: CPT | Mod: CPTII,HCNC,S$GLB, | Performed by: ORTHOPAEDIC SURGERY

## 2025-05-13 PROCEDURE — 1101F PT FALLS ASSESS-DOCD LE1/YR: CPT | Mod: CPTII,HCNC,S$GLB, | Performed by: STUDENT IN AN ORGANIZED HEALTH CARE EDUCATION/TRAINING PROGRAM

## 2025-05-13 PROCEDURE — 1126F AMNT PAIN NOTED NONE PRSNT: CPT | Mod: CPTII,HCNC,S$GLB, | Performed by: STUDENT IN AN ORGANIZED HEALTH CARE EDUCATION/TRAINING PROGRAM

## 2025-05-13 PROCEDURE — 99213 OFFICE O/P EST LOW 20 MIN: CPT | Mod: HCNC,S$GLB,, | Performed by: STUDENT IN AN ORGANIZED HEALTH CARE EDUCATION/TRAINING PROGRAM

## 2025-05-13 PROCEDURE — 3288F FALL RISK ASSESSMENT DOCD: CPT | Mod: CPTII,HCNC,S$GLB, | Performed by: ORTHOPAEDIC SURGERY

## 2025-05-13 PROCEDURE — 3288F FALL RISK ASSESSMENT DOCD: CPT | Mod: CPTII,HCNC,S$GLB, | Performed by: STUDENT IN AN ORGANIZED HEALTH CARE EDUCATION/TRAINING PROGRAM

## 2025-05-13 PROCEDURE — 73560 X-RAY EXAM OF KNEE 1 OR 2: CPT | Mod: TC,HCNC,PO,RT

## 2025-05-13 PROCEDURE — 1125F AMNT PAIN NOTED PAIN PRSNT: CPT | Mod: CPTII,HCNC,S$GLB, | Performed by: ORTHOPAEDIC SURGERY

## 2025-05-13 PROCEDURE — 1159F MED LIST DOCD IN RCRD: CPT | Mod: CPTII,HCNC,S$GLB, | Performed by: ORTHOPAEDIC SURGERY

## 2025-05-13 PROCEDURE — 99999 PR PBB SHADOW E&M-EST. PATIENT-LVL III: CPT | Mod: PBBFAC,HCNC,, | Performed by: ORTHOPAEDIC SURGERY

## 2025-05-13 RX ORDER — BENZONATATE 100 MG/1
100 CAPSULE ORAL 3 TIMES DAILY PRN
Qty: 30 CAPSULE | Refills: 0 | Status: SHIPPED | OUTPATIENT
Start: 2025-05-13 | End: 2025-05-23

## 2025-05-13 RX ADMIN — TRIAMCINOLONE ACETONIDE 40 MG: 40 INJECTION, SUSPENSION INTRA-ARTICULAR; INTRAMUSCULAR at 03:05

## 2025-05-13 NOTE — PROGRESS NOTES
"Problem visit/UC  Chief Complaint   Patient presents with    Cough     Pt daughter states cough for 3 days       Subjective   Patient ID: Silke Dodd is a 89 y.o. female.    HPI  Silke Dodd is a 89 y.o.  who presents with cough.   Patient is being seen today for same day sick visit. Patient is new to provider. The patient is accompanied by daughter. PCP is Dr. Fisher.    The patient's daughter reports that she noticed the patient has had a thick cough for the last 2 days. She has been attempting to cough things up but has not been able to. She has given her mucinex. Denies fever     Review of Systems  Objective      Vitals:    05/13/25 1641   BP: 134/62   Pulse: 62   Temp: 97.5 °F (36.4 °C)   TempSrc: Oral   SpO2: 95%   Weight: 75.3 kg (166 lb 0.1 oz)   Height: 5' 4" (1.626 m)       Physical Exam  Vitals reviewed.   Constitutional:       General: She is not in acute distress.     Appearance: Normal appearance. She is not toxic-appearing.   HENT:      Head: Normocephalic and atraumatic.   Cardiovascular:      Rate and Rhythm: Normal rate and regular rhythm.      Pulses: Normal pulses.   Pulmonary:      Effort: Pulmonary effort is normal. No respiratory distress.      Breath sounds: Normal breath sounds. No stridor. No wheezing, rhonchi or rales.   Neurological:      Mental Status: She is alert.         Procedures         Assessment & Plan   Acute cough  - The patient with thick cough for the last 2 days but has not been able to cough anything up. No additional symptoms at this time  - Patient is afebrile, with appropriate oxygenation hemodynamically stable with no acute findings on physical exam.  - Supportive treatment as needed for viral syndrome - motrin, tylenol, honey, tea, mucinex  - Discussed return precautions and warning signs for worsening symptoms   -     benzonatate (TESSALON) 100 MG capsule; Take 1 capsule (100 mg total) by mouth 3 (three) times daily as needed.  Dispense: 30 capsule; Refill: " 0          Patient given return precautions and instructed to FU with PCP regarding symptoms.

## 2025-05-14 NOTE — PROGRESS NOTES
Subjective:      Patient ID: Silke Dodd is a 89 y.o. female.    Chief Complaint: Peripheral Vascular Disease      Silke is a 89 y.o. female who presents to the clinic for evaluation and treatment of high risk feet. 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 The patient's chief complaint consists of toenails in need of trimming.  Denies being painful with wearing shoe gear.  Has not attempted to self treat.  Denies experiencing neuropathy pain but does note that her muscle occasionally feel taunt in along the soles while at rest.  Denies any additional pedal complaints.       PCP: Anibal Fisher, DO    Date Last Seen by PCP: 5/25    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    Ovarian cancer     Panic disorder        Past Surgical History:   Procedure Laterality Date    CATARACT EXTRACTION Bilateral     corrected for monovision    HYSTERECTOMY  1975    Cancer cells    MASTECTOMY Right 2016    TOTAL ABDOMINAL HYSTERECTOMY W/ BILATERAL SALPINGOOPHORECTOMY  1975       Family History   Problem Relation Name Age of Onset    Cancer Mother      Breast cancer Mother          80s    Diabetes Father      Stroke Father      Coronary artery disease Father      Amblyopia Neg Hx      Blindness Neg Hx      Cataracts Neg Hx      Glaucoma Neg Hx      Macular degeneration Neg Hx         Social History     Socioeconomic History    Marital status:     Number of children: 3   Tobacco Use    Smoking status: Never    Smokeless tobacco: Never   Substance and Sexual Activity    Alcohol use: Yes     Alcohol/week: 5.0  standard drinks of alcohol     Types: 5 Glasses of wine per week    Drug use: No    Sexual activity: Never     Birth control/protection: Surgical     Social Drivers of Health     Financial Resource Strain: Medium Risk (3/30/2025)    Overall Financial Resource Strain (CARDIA)     Difficulty of Paying Living Expenses: Somewhat hard   Food Insecurity: No Food Insecurity (3/30/2025)    Hunger Vital Sign     Worried About Running Out of Food in the Last Year: Never true     Ran Out of Food in the Last Year: Never true   Transportation Needs: No Transportation Needs (3/30/2025)    PRAPARE - Transportation     Lack of Transportation (Medical): No     Lack of Transportation (Non-Medical): No   Physical Activity: Inactive (3/30/2025)    Exercise Vital Sign     Days of Exercise per Week: 0 days     Minutes of Exercise per Session: 30 min   Stress: No Stress Concern Present (3/30/2025)    [employer] [place] [employer] - Occupational Stress Questionnaire     Feeling of Stress : Only a little   Housing Stability: Low Risk  (3/30/2025)    Housing Stability Vital Sign     Unable to Pay for Housing in the Last Year: No     Number of Times Moved in the Last Year: 0     Homeless in the Last Year: No       Current Outpatient Medications   Medication Sig Dispense Refill    acetaminophen (TYLENOL) 325 MG tablet Take 325 mg by mouth every 6 (six) hours as needed for Pain.      aspirin (ECOTRIN) 81 MG EC tablet Take 81 mg by mouth Daily.      benzonatate (TESSALON) 100 MG capsule Take 1 capsule (100 mg total) by mouth 3 (three) times daily as needed. 30 capsule 0    chlorthalidone (HYGROTEN) 25 MG Tab 25 mg. M, W, F only      clonazePAM (KLONOPIN) 0.5 MG tablet Take by mouth.      cloNIDine 0.1 mg/24 hr td ptwk (CATAPRES) 0.1 mg/24 hr 1 patch every 7 days.      diclofenac sodium (VOLTAREN) 1 % Gel Apply 2 g topically once daily. 20 g 1    ergocalciferol (ERGOCALCIFEROL) 50,000 unit Cap Take 50,000 Units by mouth every 7 days.       guaiFENesin (MUCINEX) 600 mg 12 hr tablet Take 1,200 mg by mouth 2 (two) times daily.      levothyroxine (SYNTHROID) 75 MCG tablet TAKE 1 TABLET BY MOUTH EVERY DAY 90 tablet 0    losartan (COZAAR) 100 MG tablet Take 1 tablet (100 mg total) by mouth once daily. 90 tablet 1    losartan (COZAAR) 50 MG tablet Take 50 mg by mouth once daily. am      metoprolol succinate (TOPROL-XL) 50 MG 24 hr tablet Take 50 mg by mouth every evening.      venlafaxine (EFFEXOR) 25 MG Tab        No current facility-administered medications for this visit.       Review of patient's allergies indicates:  No Known Allergies      Review of Systems   Constitutional: Negative for chills.   Cardiovascular:  Negative for claudication and leg swelling.   Skin:  Positive for color change and nail changes.   Musculoskeletal:  Negative for joint pain and joint swelling.   Gastrointestinal:  Negative for nausea and vomiting.   Neurological:  Positive for numbness and paresthesias.   Psychiatric/Behavioral:  Negative for altered mental status.            Objective:      Physical Exam  Constitutional:       General: She is not in acute distress.     Appearance: She is well-developed. She is not diaphoretic.   Cardiovascular:      Pulses:           Dorsalis pedis pulses are 1+ on the right side and 1+ on the left side.        Posterior tibial pulses are 1+ on the right side and 1+ on the left side.      Comments: CFT 3 seconds bilateral.  Pedal hair growth decreased bilateral.   Mild varicosities noted bilateral.  Mild nonpitting edema noted to the RLE and moderate non pitting edema noted to the Lt. Forefoot.  Toes are cool to touch bilateral.    Musculoskeletal:         General: No tenderness.      Comments: Muscle strength 5/5 in all muscle groups bilateral.  No tenderness nor crepitation with ROM of foot/ankle joints bilateral.  No pain with palpation to bilateral foot and ankle.  Bilateral hallux abducto valgus.  Bilateral semi-rigid contracture  of toes 2-5.       Skin:     General: Skin is warm and dry.      Capillary Refill: Capillary refill takes less than 2 seconds.      Coloration: Skin is not pale.      Findings: No abrasion, bruising, burn, ecchymosis, erythema, laceration, lesion, petechiae or rash.      Comments: Pedal skin appears thin bilateral.  Toenails x 10 appear thickened by 2 mm, elongated by 4 mm and discolored with subungual debris.  Mild incurvation noted to medial and lateral borders of bilateral hallux.  No localized sign of infection noted.     Neurological:      Mental Status: She is alert and oriented to person, place, and time.      Sensory: Sensory deficit present.      Motor: No weakness or atrophy.      Comments: Protective sensation per [place] monofilament is absent bilateral.  Light touch is absent bilateral.               Assessment:       Encounter Diagnoses   Name Primary?    Peripheral vascular disease Yes    Idiopathic peripheral neuropathy     Hammer toes of both feet     Onychomycosis due to dermatophyte              Plan:       Silke was seen today for peripheral vascular disease.    Diagnoses and all orders for this visit:    Peripheral vascular disease    Idiopathic peripheral neuropathy    Hammer toes of both feet    Onychomycosis due to dermatophyte          I counseled the patient on her conditions, their implications and medical management.    PVD and neuropathy remain stable with today's exam, will continue to monitor.    Advised to continue wearing shoe gear that accommodates for digital deformities.     With patient's permission, nails were aggressively reduced and debrided x 10 to their soft tissue attachment mechanically and with electric , removing all offending nail and debris. Patient relates relief following the procedure. She will continue to monitor the areas daily, inspect her feet, wear protective shoe gear when ambulatory, moisturizer to maintain skin integrity and follow in this  office in approximately 3 months, sooner stephanie.    Narayan Jonas DPM

## 2025-05-19 ENCOUNTER — PATIENT MESSAGE (OUTPATIENT)
Dept: ORTHOPEDICS | Facility: CLINIC | Age: OVER 89
End: 2025-05-19
Payer: MEDICARE

## 2025-05-26 ENCOUNTER — TELEPHONE (OUTPATIENT)
Dept: ORTHOPEDICS | Facility: CLINIC | Age: OVER 89
End: 2025-05-26
Payer: MEDICARE

## 2025-05-26 NOTE — TELEPHONE ENCOUNTER
Spoke with daughter. Mother still in pain. Will discuss with Dr. Jauregui and get back to daughter.

## 2025-05-26 NOTE — TELEPHONE ENCOUNTER
----- Message from Med Assistant May sent at 5/26/2025  9:51 AM CDT -----  Contact: adriana pacheco  Mother still in pain Call back

## 2025-05-27 RX ORDER — TRIAMCINOLONE ACETONIDE 40 MG/ML
40 INJECTION, SUSPENSION INTRA-ARTICULAR; INTRAMUSCULAR
Status: DISCONTINUED | OUTPATIENT
Start: 2025-05-13 | End: 2025-05-27 | Stop reason: HOSPADM

## 2025-05-27 NOTE — PROGRESS NOTES
Chief Complaint   Patient presents with    Left Knee - Pain         HPI:   This is a 89 y.o. who presents to clinic today complaining of left knee pain for 5 years after no known trauma. Pain is progressively worsening. No numbness or tingling. No associated signs or symptoms.    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    Ovarian cancer     Panic disorder      Past Surgical History:   Procedure Laterality Date    CATARACT EXTRACTION Bilateral     corrected for monovision    HYSTERECTOMY  1975    Cancer cells    MASTECTOMY Right 2016    TOTAL ABDOMINAL HYSTERECTOMY W/ BILATERAL SALPINGOOPHORECTOMY  1975     Medications Ordered Prior to Encounter[1]  Review of patient's allergies indicates:  No Known Allergies  Family History   Problem Relation Name Age of Onset    Cancer Mother      Breast cancer Mother          80s    Diabetes Father      Stroke Father      Coronary artery disease Father      Amblyopia Neg Hx      Blindness Neg Hx      Cataracts Neg Hx      Glaucoma Neg Hx      Macular degeneration Neg Hx       Social History[2]    Review of Systems:  Constitutional:  Denies fever or chills   Eyes:  Denies change in visual acuity   HENT:  Denies nasal congestion or sore throat   Respiratory:  Denies cough or shortness of breath   Cardiovascular:  Denies chest pain or edema   GI:  Denies abdominal pain, nausea, vomiting, bloody stools or diarrhea   :  Denies dysuria   Integument:  Denies rash   Neurologic:  Denies headache, focal weakness or sensory changes   Endocrine:  Denies polyuria or polydipsia   Lymphatic:  Denies swollen glands   Psychiatric:  Denies depression or anxiety     Physical Exam:   Constitutional:  Well developed, well nourished, no acute distress, non-toxic appearance   Integument:   Well hydrated, no rash   Lymphatic:  No lymphadenopathy noted   Neurologic:  Alert & oriented x 3, CN 2-12 normal, normal motor function, normal sensory function, no focal deficits noted   Psychiatric:  Speech and behavior appropriate   Eyes: EOMI  Gi: abdomen soft    Bilateral Knee Exam    left Knee Exam     Tenderness   The patient is experiencing tenderness in the medial joint line.    Range of Motion   Extension: abnormal   Flexion: abnormal     Muscle Strength     The patient has normal knee strength.    Tests   Kaden:  Medial - positive   Lachman:  Anterior - negative      Varus: negative  Valgus: negative  Patellar Apprehension: negative    Other   Erythema: absent  Sensation: normal  Pulse: present  Swelling: mild      right Knee Exam   right knee exam performed same as contralateral side and is normal.            X-rays were performed, personally reviewed by me and findings discussed with the patient.  3 views of the left knee show tricompartmental degenerative change most pronounced in the medial compartment with Kellgren 3 changes    Primary osteoarthritis of left knee            Using an aseptic technique, I injected 5 cc of lidocaine 1% without and 1 cc of kenalog 40mg into the left Knee. The patient tolerated this well. I will have them return to clinic as needed.             [1]   Current Outpatient Medications on File Prior to Visit   Medication Sig Dispense Refill    acetaminophen (TYLENOL) 325 MG tablet Take 325 mg by mouth every 6 (six) hours as needed for Pain.      aspirin (ECOTRIN) 81 MG EC tablet Take 81 mg by mouth Daily.      chlorthalidone (HYGROTEN) 25 MG Tab 25 mg. M, W, F only      clonazePAM (KLONOPIN) 0.5 MG tablet Take by mouth.      cloNIDine 0.1 mg/24 hr td ptwk (CATAPRES) 0.1 mg/24 hr 1 patch every 7 days.      diclofenac sodium (VOLTAREN) 1 % Gel Apply 2 g topically once daily. 20 g 1    ergocalciferol (ERGOCALCIFEROL) 50,000 unit Cap Take 50,000 Units by mouth every 7 days.       guaiFENesin (MUCINEX) 600 mg 12 hr tablet Take 1,200 mg by mouth 2 (two) times daily.      levothyroxine (SYNTHROID) 75 MCG tablet TAKE 1 TABLET BY MOUTH EVERY DAY 90 tablet 0    losartan (COZAAR) 100 MG tablet Take 1 tablet (100 mg total) by mouth once daily. 90 tablet 1    losartan (COZAAR) 50 MG tablet Take 50 mg by mouth once daily. am      metoprolol succinate (TOPROL-XL) 50 MG 24 hr tablet Take 50 mg by mouth every evening.      venlafaxine (EFFEXOR) 25 MG Tab        No current facility-administered medications on file prior to visit.   [2]   Social History  Socioeconomic History    Marital status:     Number of children: 3   Tobacco Use    Smoking status: Never    Smokeless tobacco: Never   Substance and Sexual Activity    Alcohol use: Yes     Alcohol/week: 5.0 standard drinks of alcohol     Types: 5 Glasses of wine per week    Drug use: No    Sexual activity: Never     Birth control/protection: Surgical     Social Drivers of Health     Financial Resource Strain: Medium Risk (3/30/2025)    Overall Financial Resource Strain (CARDIA)     Difficulty of Paying Living Expenses: Somewhat hard   Food Insecurity: No Food Insecurity (3/30/2025)    Hunger Vital Sign     Worried About Running Out of Food in the Last Year: Never true     Ran Out of Food in the Last Year: Never true   Transportation Needs: No Transportation Needs (3/30/2025)    PRAPARE - Transportation     Lack of Transportation (Medical): No     Lack of Transportation (Non-Medical): No   Physical Activity: Inactive (3/30/2025)    Exercise Vital Sign     Days of Exercise per Week: 0 days     Minutes of Exercise per Session: 30 min   Stress: No Stress Concern Present (3/30/2025)    [employer] - Occupational Stress Questionnaire     Feeling of Stress : Only a little   Housing Stability: Low Risk  (3/30/2025)    Housing Stability Vital Sign     Unable to Pay for Housing in the Last Year: No     Number of Times Moved in  the Last Year: 0     Homeless in the Last Year: No

## 2025-05-27 NOTE — PROCEDURES
Large Joint Aspiration/Injection: L knee    Date/Time: 5/13/2025 3:45 PM    Performed by: Syed Jauregui MD  Authorized by: Syed Jauregui MD    Consent Done?:  Yes (Verbal)  Indications:  Pain  Timeout: prior to procedure the correct patient, procedure, and site was verified    Prep: patient was prepped and draped in usual sterile fashion    Local anesthetic:  Lidocaine 1% without epinephrine  Anesthetic total (ml):  5      Details:  Needle Size:  21 G  Approach:  Anterolateral  Location:  Knee  Site:  L knee  Medications:  40 mg triamcinolone acetonide 40 mg/mL  Patient tolerance:  Patient tolerated the procedure well with no immediate complications

## 2025-06-10 ENCOUNTER — OFFICE VISIT (OUTPATIENT)
Dept: PHYSICAL MEDICINE AND REHAB | Facility: CLINIC | Age: OVER 89
End: 2025-06-10
Payer: MEDICARE

## 2025-06-10 VITALS
HEART RATE: 57 BPM | DIASTOLIC BLOOD PRESSURE: 62 MMHG | WEIGHT: 160 LBS | BODY MASS INDEX: 27.46 KG/M2 | SYSTOLIC BLOOD PRESSURE: 137 MMHG

## 2025-06-10 DIAGNOSIS — M17.0 BILATERAL PRIMARY OSTEOARTHRITIS OF KNEE: ICD-10-CM

## 2025-06-10 DIAGNOSIS — M23.52 CHRONIC INSTABILITY OF LEFT KNEE: ICD-10-CM

## 2025-06-10 DIAGNOSIS — M25.562 CHRONIC PAIN OF LEFT KNEE: Primary | ICD-10-CM

## 2025-06-10 DIAGNOSIS — G89.29 CHRONIC PAIN OF LEFT KNEE: Primary | ICD-10-CM

## 2025-06-10 PROCEDURE — 99204 OFFICE O/P NEW MOD 45 MIN: CPT | Mod: HCNC,25,S$GLB, | Performed by: PHYSICAL MEDICINE & REHABILITATION

## 2025-06-10 PROCEDURE — 1126F AMNT PAIN NOTED NONE PRSNT: CPT | Mod: CPTII,HCNC,S$GLB, | Performed by: PHYSICAL MEDICINE & REHABILITATION

## 2025-06-10 PROCEDURE — 20611 DRAIN/INJ JOINT/BURSA W/US: CPT | Mod: HCNC,LT,S$GLB, | Performed by: PHYSICAL MEDICINE & REHABILITATION

## 2025-06-10 PROCEDURE — 1159F MED LIST DOCD IN RCRD: CPT | Mod: CPTII,HCNC,S$GLB, | Performed by: PHYSICAL MEDICINE & REHABILITATION

## 2025-06-10 PROCEDURE — 99999 PR PBB SHADOW E&M-EST. PATIENT-LVL III: CPT | Mod: PBBFAC,HCNC,, | Performed by: PHYSICAL MEDICINE & REHABILITATION

## 2025-06-10 PROCEDURE — 1160F RVW MEDS BY RX/DR IN RCRD: CPT | Mod: CPTII,HCNC,S$GLB, | Performed by: PHYSICAL MEDICINE & REHABILITATION

## 2025-06-10 RX ORDER — KETOROLAC TROMETHAMINE 30 MG/ML
30 INJECTION, SOLUTION INTRAMUSCULAR; INTRAVENOUS
Status: DISCONTINUED | OUTPATIENT
Start: 2025-06-10 | End: 2025-06-10 | Stop reason: HOSPADM

## 2025-06-10 RX ORDER — LIDOCAINE HYDROCHLORIDE 10 MG/ML
4 INJECTION, SOLUTION INFILTRATION; PERINEURAL
Status: DISCONTINUED | OUTPATIENT
Start: 2025-06-10 | End: 2025-06-10 | Stop reason: HOSPADM

## 2025-06-10 RX ADMIN — LIDOCAINE HYDROCHLORIDE 4 ML: 10 INJECTION, SOLUTION INFILTRATION; PERINEURAL at 08:06

## 2025-06-10 RX ADMIN — KETOROLAC TROMETHAMINE 30 MG: 30 INJECTION, SOLUTION INTRAMUSCULAR; INTRAVENOUS at 08:06

## 2025-06-10 NOTE — PROCEDURES
Large Joint Aspiration/Injection: L knee    Date/Time: 6/10/2025 8:30 AM    Performed by: Melissa Robertson, DO  Authorized by: Melissa Robertson, DO    Consent Done?:  Yes (Verbal)  Indications:  Pain, diagnostic evaluation, joint swelling and arthritis  Site marked: the procedure site was marked    Timeout: prior to procedure the correct patient, procedure, and site was verified    Prep: patient was prepped and draped in usual sterile fashion      Local anesthesia used?: Yes    Anesthesia:  Local infiltration  Local anesthetic:  Lidocaine 1% without epinephrine  Anesthetic total (ml):  1      Details:  Needle Size:  18 G  Ultrasonic Guidance for needle placement?: Yes    Images are saved and documented.  Approach:  Lateral  Location:  Knee  Site:  L knee  Medications:  4 mL LIDOcaine HCL 10 mg/ml (1%) 10 mg/mL (1 %); 30 mg ketorolac 30 mg/mL (1 mL)  Aspirate amount (mL):  3  Aspirate:  Serous and yellow  Patient tolerance:  Patient tolerated the procedure well with no immediate complications     Ultrasound guidance was used for correct needle placement, the images were saved and will be uploaded to EMR.

## 2025-06-10 NOTE — PROGRESS NOTES
[facility]    Consulting Provider: Dr. Syed Jauregui    CHIEF COMPLAINT:   Chief Complaint   Patient presents with    Knee Pain     Pt c/o left knee pain, Pt says it hurts in the back of knee.       HISTORY OF PRESENT ILLNESS: Silke Dodd is a 89 y.o. female who presents to me for evaluation and management of chronic left knee pain.     Today reports, chronic left knee pain without noted traumatic event. Pain to back of knee worse with prolonged sitting and ambulation. Notes instability to knee with swelling. Previously seen by ortho for steroid injections without any significant relief.     Medications: tylenol, voltaren gel  Injections:   - left knee 05/13/2025  Procedures:   Therapies: PT at nursing home  Bracing: none  DME: rollator    Review of Systems   Constitutional: Negative for fever.   HENT: Negative for drooling.    Eyes: Negative for discharge.   Respiratory: Negative for choking.    Cardiovascular: Negative for chest pain.   Genitourinary: Negative for flank pain.   Skin: Negative for wound.   Allergic/Immunologic: Negative for immunocompromised state.   Neurological: Negative for tremors and syncope.   Psychiatric/Behavioral: Negative for behavioral problems.     Past Medical History:   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    Ovarian cancer     Panic disorder        Past Surgical History:   Past Surgical History:   Procedure Laterality Date    CATARACT EXTRACTION Bilateral     corrected for monovision    HYSTERECTOMY  1975    Cancer cells    MASTECTOMY Right 2016    TOTAL ABDOMINAL HYSTERECTOMY W/ BILATERAL SALPINGOOPHORECTOMY  1975       Family History:   Family History   Problem Relation Name Age of Onset    Cancer Mother      Breast cancer  Mother          80s    Diabetes Father      Stroke Father      Coronary artery disease Father      Amblyopia Neg Hx      Blindness Neg Hx      Cataracts Neg Hx      Glaucoma Neg Hx      Macular degeneration Neg Hx         Medications: Medications Ordered Prior to Encounter[1]    Allergies: Review of patient's allergies indicates:  No Known Allergies    Social History:   Social History     Socioeconomic History    Marital status:     Number of children: 3   Tobacco Use    Smoking status: Never    Smokeless tobacco: Never   Substance and Sexual Activity    Alcohol use: Yes     Alcohol/week: 5.0 standard drinks of alcohol     Types: 5 Glasses of wine per week    Drug use: No    Sexual activity: Never     Birth control/protection: Surgical     Social Drivers of Health     Financial Resource Strain: Medium Risk (3/30/2025)    Overall Financial Resource Strain (CARDIA)     Difficulty of Paying Living Expenses: Somewhat hard   Food Insecurity: No Food Insecurity (3/30/2025)    Hunger Vital Sign     Worried About Running Out of Food in the Last Year: Never true     Ran Out of Food in the Last Year: Never true   Transportation Needs: No Transportation Needs (3/30/2025)    PRAPARE - Transportation     Lack of Transportation (Medical): No     Lack of Transportation (Non-Medical): No   Physical Activity: Inactive (3/30/2025)    Exercise Vital Sign     Days of Exercise per Week: 0 days     Minutes of Exercise per Session: 30 min   Stress: No Stress Concern Present (3/30/2025)    [employer] - Occupational Stress Questionnaire     Feeling of Stress : Only a little   Housing Stability: Low Risk  (3/30/2025)    Housing Stability Vital Sign     Unable to Pay for Housing in the Last Year: No     Number of Times Moved in the Last Year: 0     Homeless in the Last Year: No       PHYSICAL EXAMINATION:   Vitals:    06/10/25 0906   BP: 137/62   BP Location: Left forearm   Patient Position: Sitting    Pulse: (!) 57   Weight: 72.6 kg (160 lb)     Constitutional: No apparent distress. Pleasant.  HENT: Trachea midline.  Head: Normocephalic and atraumatic.   Eyes: Right eye exhibits no discharge. Left eye exhibits no discharge. No scleral icterus.   CV: Well perfused.   Pulmonary/Chest: Effort normal. No respiratory distress.   Abdominal: There is no guarding.   Neurological: Awake, alert and cooperative.  SKIN: Intact no apparent lesions, cut, ulcers or abrasions  EXT:  No cyanosis, clubbing, or edema.  SENSORY: Intact to light touch in the bilateral lower extemities.  KNEE EXAMINATION:  MUSCULOSKELETAL:   Muscle Strength:(0-5)                             Right     Left  Hip Flexors                5  5  Hip Abductor              5  5  Hip Adductor              5  5  Knee Extensors          5  5  Knee Flexors              5  5  Ankle Dorsiflex           5  5  Ankle Planterflex        5  5  EHL                            5  5    Reflexes: (0-4+/4)   Right     Left  Patellar(L4)  2+  2+  Ankle(S1)  2+  2+       INSPECTION:                                                                           Right                Left        Localized/Generalized swelling:                     -                       -  Muscle contours normal and symmetrical:     +                      +  Patellas symetrical and level:                         +                      +  Ecchymoses:                                                   -                       -  Erythema:                                                        -                       -  Gross deformity:                                             -                       -     KNEE DEFORMITY:            Right                Left  Normal:                       +                      +  Genu varus:                -                       -  Genu valgum:             -                       -  Genu Recurvatum:     -                       -     RANGE OF MOTION:           Right                 Left  Flexion:                       Full                  Full        Extension:                   Full                  Full  Other:      TENDERNESS:                        Right                Left  Medial Tibial Plateau:             -                       -  Lateral Tibial Plateau:             -                       -  Medial Femoral Condyle:        -                       -  Lateral Femoral Condyle:       -                       -  Head of the Fibula:                 -                       -  Medial joint line:                      -                       -  Lateral joint line:                      -                       +  Patella:                                    -                       -  Medial collateral lig:                -                       -  Lateral collateral lig:                -                       -  Pes Anserine:                         -                       -     SOFT TISSUE PALPATION:                                       Right                Left  Baker's cyst:                -                       +             Effusion:                      -                       +  Ballottement:               -                       -  Other:                          -                       -      JOINT STABILITY:       left knee with palpable clunking with varus/valgus stressing and pain.     IMAGING:  XR knee 05/13/2025 with noted bilateral tricompartmental osteoarthritis, kellgren theo grade 2.     Data Reviewed: X-ray  Supportive Actions: Independent visualization of images or test specimens.    ASSESSMENT:   1. Chronic pain of left knee    2. Bilateral primary osteoarthritis of knee    3. Chronic instability of left knee        PLAN:   1. Time was spent reviewing the above diagnosis in depth with Silke today, including acute management and rehabilitation.     2. We discussed options for management of her pain would be to consider injection of either short acting steroid  "or ketorolac. The use, benefits, risks, and expectations of all of these types of injections was discussed at length with her today. At this time, she elects to proceed with ultrasound-guided left intra-articular knee ketorolac injection(s). This procedure was completed today in clinic. Please see procedure note for further details. We can repeat this every 3 months if appropriate.     Too early to repeat left knee steroid injection  Left knee aspiration performed today with noted Baker's cyst and suprapatellar effusion. Obtained 3 cc serous yellow fluid.     3. Okay to repeat left knee steroid 3 months from previous.      4. For her chronic left knee instability, she needs a custom left knee orthosis with extension assist (icarus-style). Order placed and faxed to [facility].     RTC 2 months to assess KO and consider repeat steroid injection.     This is a consult from Dr. Syed Jauregui. Please see the "Communications" section of Epic to see how the consulting physician received the report of today's findings and recommendations. If it's an [facility] provider, it will be forwarded to his/her "in basket".    45 minutes of total time spent on the encounter, which includes face to face time and non-face to face time preparing to see the patient (eg, review of tests), obtaining and/or reviewing separately obtained history, documenting clinical information in the electronic or other health record, independently interpreting results (not separately reported), communicating results to the patient/family/caregiver, and/or care coordination (not separately reported).        [1]   Current Outpatient Medications on File Prior to Visit   Medication Sig Dispense Refill    acetaminophen (TYLENOL) 325 MG tablet Take 325 mg by mouth every 6 (six) hours as needed for Pain.      aspirin (ECOTRIN) 81 MG EC tablet Take 81 mg by mouth Daily.      chlorthalidone (HYGROTEN) 25 MG Tab 25 mg. M, W, F only      clonazePAM (KLONOPIN) 0.5 MG " tablet Take by mouth.      cloNIDine 0.1 mg/24 hr td ptwk (CATAPRES) 0.1 mg/24 hr 1 patch every 7 days.      diclofenac sodium (VOLTAREN) 1 % Gel Apply 2 g topically once daily. 20 g 1    ergocalciferol (ERGOCALCIFEROL) 50,000 unit Cap Take 50,000 Units by mouth every 7 days.      guaiFENesin (MUCINEX) 600 mg 12 hr tablet Take 1,200 mg by mouth 2 (two) times daily.      levothyroxine (SYNTHROID) 75 MCG tablet TAKE 1 TABLET BY MOUTH EVERY DAY 90 tablet 0    losartan (COZAAR) 100 MG tablet Take 1 tablet (100 mg total) by mouth once daily. 90 tablet 1    losartan (COZAAR) 50 MG tablet Take 50 mg by mouth once daily. am      metoprolol succinate (TOPROL-XL) 50 MG 24 hr tablet Take 50 mg by mouth every evening.      venlafaxine (EFFEXOR) 25 MG Tab        No current facility-administered medications on file prior to visit.

## 2025-07-23 ENCOUNTER — TELEPHONE (OUTPATIENT)
Dept: NEUROLOGY | Facility: CLINIC | Age: OVER 89
End: 2025-07-23
Payer: MEDICARE

## 2025-07-23 NOTE — TELEPHONE ENCOUNTER
Copied from CRM #[id number]. Topic: General Inquiry - Return Call  >> Jul 23, 2025  9:49 AM Pepe wrote:  Type:  Patient Returning Call    Who Called:Pt Daughter  Who Left Message for Patient:Pt  Does the patient know what this is regarding?:Carmelina  Would the patient rather a call back or a response via flikdatener? Call  Best Call Back Number:[phone]   Additional Information:   Pt Daughter needs to schedule for Mother Thank you

## 2025-08-12 ENCOUNTER — OFFICE VISIT (OUTPATIENT)
Dept: NEUROLOGY | Facility: CLINIC | Age: OVER 89
End: 2025-08-12
Payer: MEDICARE

## 2025-08-12 VITALS
DIASTOLIC BLOOD PRESSURE: 57 MMHG | HEART RATE: 67 BPM | SYSTOLIC BLOOD PRESSURE: 140 MMHG | HEIGHT: 64 IN | BODY MASS INDEX: 27.46 KG/M2

## 2025-08-12 DIAGNOSIS — R41.0 CONFUSION: Primary | ICD-10-CM

## 2025-08-12 DIAGNOSIS — Z86.79 HISTORY OF SUBDURAL HEMATOMA: ICD-10-CM

## 2025-08-12 PROCEDURE — 99999 PR PBB SHADOW E&M-EST. PATIENT-LVL IV: CPT | Mod: PBBFAC,HCNC,, | Performed by: NURSE PRACTITIONER

## 2025-08-12 RX ORDER — TRAZODONE HYDROCHLORIDE 50 MG/1
25 TABLET ORAL NIGHTLY
COMMUNITY

## 2025-08-19 ENCOUNTER — PATIENT MESSAGE (OUTPATIENT)
Dept: NEUROLOGY | Facility: CLINIC | Age: OVER 89
End: 2025-08-19
Payer: MEDICARE